# Patient Record
Sex: FEMALE | Race: WHITE | Employment: UNEMPLOYED | URBAN - NONMETROPOLITAN AREA
[De-identification: names, ages, dates, MRNs, and addresses within clinical notes are randomized per-mention and may not be internally consistent; named-entity substitution may affect disease eponyms.]

---

## 2018-08-20 LAB
CHOLESTEROL, TOTAL: 214 MG/DL
CHOLESTEROL/HDL RATIO: 4.9
CREATININE: 0.5 MG/DL
HDLC SERPL-MCNC: 44 MG/DL (ref 35–70)
LDL CHOLESTEROL CALCULATED: 146 MG/DL (ref 0–160)
POTASSIUM (K+): 4.6
TRIGL SERPL-MCNC: 120 MG/DL
VLDLC SERPL CALC-MCNC: NORMAL MG/DL

## 2019-05-07 ENCOUNTER — OFFICE VISIT (OUTPATIENT)
Dept: FAMILY MEDICINE CLINIC | Age: 49
End: 2019-05-07
Payer: COMMERCIAL

## 2019-05-07 VITALS
OXYGEN SATURATION: 97 % | DIASTOLIC BLOOD PRESSURE: 70 MMHG | HEIGHT: 59 IN | TEMPERATURE: 98.2 F | RESPIRATION RATE: 18 BRPM | HEART RATE: 114 BPM | SYSTOLIC BLOOD PRESSURE: 110 MMHG | BODY MASS INDEX: 36.49 KG/M2 | WEIGHT: 181 LBS

## 2019-05-07 DIAGNOSIS — L25.9 CONTACT DERMATITIS, UNSPECIFIED CONTACT DERMATITIS TYPE, UNSPECIFIED TRIGGER: ICD-10-CM

## 2019-05-07 DIAGNOSIS — L30.9 DERMATITIS: Primary | ICD-10-CM

## 2019-05-07 PROCEDURE — 96372 THER/PROPH/DIAG INJ SC/IM: CPT | Performed by: PHYSICIAN ASSISTANT

## 2019-05-07 PROCEDURE — 99213 OFFICE O/P EST LOW 20 MIN: CPT | Performed by: PHYSICIAN ASSISTANT

## 2019-05-07 RX ORDER — PREDNISONE 10 MG/1
TABLET ORAL
Qty: 30 TABLET | Refills: 0 | Status: SHIPPED | OUTPATIENT
Start: 2019-05-07 | End: 2019-05-19

## 2019-05-07 RX ORDER — METHYLPREDNISOLONE ACETATE 40 MG/ML
40 INJECTION, SUSPENSION INTRA-ARTICULAR; INTRALESIONAL; INTRAMUSCULAR; SOFT TISSUE ONCE
Status: COMPLETED | OUTPATIENT
Start: 2019-05-07 | End: 2019-05-07

## 2019-05-07 RX ORDER — LEVOMILNACIPRAN HYDROCHLORIDE 120 MG/1
120 CAPSULE, EXTENDED RELEASE ORAL DAILY
Refills: 0 | COMMUNITY
Start: 2019-02-27 | End: 2022-03-10 | Stop reason: ALTCHOICE

## 2019-05-07 RX ORDER — TRIAMTERENE AND HYDROCHLOROTHIAZIDE 37.5; 25 MG/1; MG/1
1 TABLET ORAL DAILY
Refills: 0 | COMMUNITY
Start: 2019-03-18 | End: 2019-06-03 | Stop reason: SDUPTHER

## 2019-05-07 RX ORDER — LISINOPRIL 10 MG/1
1 TABLET ORAL DAILY
Refills: 0 | COMMUNITY
Start: 2019-03-18 | End: 2019-06-03 | Stop reason: SDUPTHER

## 2019-05-07 RX ADMIN — METHYLPREDNISOLONE ACETATE 40 MG: 40 INJECTION, SUSPENSION INTRA-ARTICULAR; INTRALESIONAL; INTRAMUSCULAR; SOFT TISSUE at 11:41

## 2019-05-07 NOTE — PATIENT INSTRUCTIONS
Patient Education        Dermatitis: Care Instructions  Your Care Instructions  Dermatitis is the general name used for any rash or inflammation of the skin. Different kinds of dermatitis cause different kinds of rashes. Common causes of a rash include new medicines, plants (such as poison oak or poison ivy), heat, and stress. Certain illnesses can also cause a rash. An allergic reaction to something that touches your skin, such as latex, nickel, or poison ivy, is called contact dermatitis. Contact dermatitis may also be caused by something that irritates the skin, such as bleach, a chemical, or soap. These types of rashes cannot be spread from person to person. How long your rash will last depends on what caused it. Rashes may last a few days or months. Follow-up care is a key part of your treatment and safety. Be sure to make and go to all appointments, and call your doctor if you are having problems. It's also a good idea to know your test results and keep a list of the medicines you take. How can you care for yourself at home? · Do not scratch the rash. Cut your nails short, and file them smooth. Or wear gloves if this helps keep you from scratching. · Wash the area with water only. Pat dry. · Put cold, wet cloths on the rash to reduce itching. · Keep cool, and stay out of the sun. · Leave the rash open to the air as much as possible. · If the rash itches, use hydrocortisone cream. Follow the directions on the label. Calamine lotion may help for plant rashes. · Take an over-the-counter antihistamine, such as diphenhydramine (Benadryl) or loratadine (Claritin), to help calm the itching. Read and follow all instructions on the label. · If your doctor prescribed a cream, use it as directed. If your doctor prescribed medicine, take it exactly as directed. When should you call for help?   Call your doctor now or seek immediate medical care if:    · You have symptoms of infection, such as:  ? Increased pain, swelling, warmth, or redness. ? Red streaks leading from the area. ? Pus draining from the area. ? A fever.     · You have joint pain along with the rash.    Watch closely for changes in your health, and be sure to contact your doctor if:    · Your rash is changing or getting worse.     · You are not getting better as expected. Where can you learn more? Go to https://[employer]. org and sign in to your [employer] account. Enter [phone] in the [facility] box to learn more about \"Dermatitis: Care Instructions. \"     If you do not have an account, please click on the \"Sign Up Now\" link. Current as of: April 17, 2018  Content Version: 11.9  © 9630-9470 [employer]. Care instructions adapted under license by [facility] ([facility]). If you have questions about a medical condition or this instruction, always ask your healthcare professional. [place] [address] any warranty or liability for your use of this information.

## 2019-05-07 NOTE — PROGRESS NOTES
Speech is articulate and fluent. Psych: Mood/Affect: Patient's mood and affect is appropriate to situation. Benadryl prn itching      Diagnosis Orders   1. Dermatitis  predniSONE (DELTASONE) 10 MG tablet   2.  Contact dermatitis, unspecified contact dermatitis type, unspecified trigger  methylPREDNISolone acetate (DEPO-MEDROL) injection 40 mg         Seen By:    JERICA Reagan

## 2019-06-03 ENCOUNTER — OFFICE VISIT (OUTPATIENT)
Dept: PRIMARY CARE CLINIC | Age: 49
End: 2019-06-03
Payer: COMMERCIAL

## 2019-06-03 VITALS
WEIGHT: 182 LBS | OXYGEN SATURATION: 99 % | BODY MASS INDEX: 36.69 KG/M2 | RESPIRATION RATE: 18 BRPM | TEMPERATURE: 98.3 F | HEIGHT: 59 IN | SYSTOLIC BLOOD PRESSURE: 120 MMHG | DIASTOLIC BLOOD PRESSURE: 80 MMHG | HEART RATE: 99 BPM

## 2019-06-03 DIAGNOSIS — I34.0 NON-RHEUMATIC MITRAL REGURGITATION: ICD-10-CM

## 2019-06-03 DIAGNOSIS — E78.2 MIXED HYPERLIPIDEMIA: ICD-10-CM

## 2019-06-03 DIAGNOSIS — F34.1 DYSTHYMIA: ICD-10-CM

## 2019-06-03 DIAGNOSIS — I10 ESSENTIAL HYPERTENSION: Primary | ICD-10-CM

## 2019-06-03 PROBLEM — R03.0 ELEVATED BLOOD-PRESSURE READING WITHOUT DIAGNOSIS OF HYPERTENSION: Status: ACTIVE | Noted: 2019-06-03

## 2019-06-03 PROCEDURE — 99213 OFFICE O/P EST LOW 20 MIN: CPT | Performed by: INTERNAL MEDICINE

## 2019-06-03 RX ORDER — DILTIAZEM HYDROCHLORIDE 120 MG/1
120 CAPSULE, EXTENDED RELEASE ORAL DAILY
COMMUNITY
End: 2019-06-03 | Stop reason: SDUPTHER

## 2019-06-03 RX ORDER — LISINOPRIL 10 MG/1
10 TABLET ORAL DAILY
Qty: 30 TABLET | Refills: 3 | Status: SHIPPED | OUTPATIENT
Start: 2019-06-03 | End: 2019-09-03 | Stop reason: SDUPTHER

## 2019-06-03 RX ORDER — DILTIAZEM HYDROCHLORIDE 120 MG/1
120 CAPSULE, EXTENDED RELEASE ORAL DAILY
Qty: 30 CAPSULE | Refills: 3 | Status: SHIPPED | OUTPATIENT
Start: 2019-06-03 | End: 2019-09-03 | Stop reason: SDUPTHER

## 2019-06-03 RX ORDER — TRIAMTERENE AND HYDROCHLOROTHIAZIDE 37.5; 25 MG/1; MG/1
1 TABLET ORAL DAILY
Qty: 30 TABLET | Refills: 3 | Status: SHIPPED | OUTPATIENT
Start: 2019-06-03 | End: 2019-09-03 | Stop reason: SDUPTHER

## 2019-06-03 ASSESSMENT — ENCOUNTER SYMPTOMS
SORE THROAT: 0
STRIDOR: 0
FACIAL SWELLING: 0
VOMITING: 0
SHORTNESS OF BREATH: 0
EYE ITCHING: 0
WHEEZING: 0
PHOTOPHOBIA: 0
TROUBLE SWALLOWING: 0
NAUSEA: 0
COUGH: 0
CONSTIPATION: 0
BLOOD IN STOOL: 0
EYE DISCHARGE: 0
RHINORRHEA: 0
ANAL BLEEDING: 0
COLOR CHANGE: 0
ABDOMINAL PAIN: 0
EYE PAIN: 0
DIARRHEA: 0

## 2019-06-03 ASSESSMENT — PATIENT HEALTH QUESTIONNAIRE - PHQ9
SUM OF ALL RESPONSES TO PHQ QUESTIONS 1-9: 0
2. FEELING DOWN, DEPRESSED OR HOPELESS: 0
SUM OF ALL RESPONSES TO PHQ9 QUESTIONS 1 & 2: 0
SUM OF ALL RESPONSES TO PHQ QUESTIONS 1-9: 0
1. LITTLE INTEREST OR PLEASURE IN DOING THINGS: 0

## 2019-06-03 NOTE — ASSESSMENT & PLAN NOTE
Blood pressures are stable. Continue medications and monitor blood pressures at home. Call office if systolics are over 974 over diastolics over 90.  Check CMP

## 2019-06-03 NOTE — PROGRESS NOTES
6/3/2019    Name: Melly Hernandez : 1970 Sex: female  Age: 52 y.o. Subjective:  Chief Complaint   Patient presents with    Hypertension        HPI     Patient has a history of nonrheumatic mitral regurgitation under the care of Dr. Ruel Hoang. Most recent echocardiogram done about a month ago showed continued improvement in her ejection fraction. She started out with a 40% ejection fraction that improved to 60% in November. She has an appointment to see him next month. She hasn't had her blood work done yet. She promises to get up in the near future. Last blood work last year showed total cholesterol 214, HDL cholesterol 44 and LDL cholesterol 146. She saw Dr. Jerzy Pinzon this year and she had a Pap test and a mammogram done the mammogram was done at [facility] see about getting the report. Depression and sees Dr. Giuseppe Prieto her psychiatrist. He has been on [address]. Biggest problem is diaphoresis. Can happen any time day or night. She wonders if one of her medications could be causing this. Review of Systems   Constitutional: Negative for appetite change, fatigue and unexpected weight change. HENT: Negative for congestion, ear pain, facial swelling, rhinorrhea, sore throat, tinnitus and trouble swallowing. Eyes: Negative for photophobia, pain, discharge, itching and visual disturbance. Respiratory: Negative for cough, shortness of breath, wheezing and stridor. Cardiovascular: Negative for chest pain, palpitations and leg swelling. Gastrointestinal: Negative for abdominal pain, anal bleeding, blood in stool, constipation, diarrhea, nausea and vomiting. Endocrine: Negative for cold intolerance, heat intolerance, polydipsia, polyphagia and polyuria. Severe sweating spells   Genitourinary: Negative for difficulty urinating, dysuria, flank pain, frequency, hematuria and urgency. Musculoskeletal: Negative for arthralgias, gait problem, joint swelling and myalgias.    Skin: Negative for color change, pallor and rash. Allergic/Immunologic: Negative for environmental allergies and food allergies. Neurological: Negative for dizziness, tremors, seizures, syncope, speech difficulty, weakness, light-headedness, numbness and headaches. Hematological: Negative for adenopathy. Does not bruise/bleed easily. Psychiatric/Behavioral: Negative for agitation, behavioral problems, confusion, sleep disturbance and suicidal ideas. The patient is not nervous/anxious. Current Outpatient Medications:     diltiazem (DILT-XR) 120 MG extended release capsule, Take 1 capsule by mouth daily, Disp: 30 capsule, Rfl: 3    lisinopril (PRINIVIL;ZESTRIL) 10 MG tablet, Take 1 tablet by mouth daily, Disp: 30 tablet, Rfl: 3    triamterene-hydrochlorothiazide (MAXZIDE-25) 37.5-25 MG per tablet, Take 1 tablet by mouth daily, Disp: 30 tablet, Rfl: 3    FETZIMA 120 MG CP24 extended release capsule, 1 capsule daily, Disp: , Rfl: 0     Allergies   Allergen Reactions    Bupropion Hives        Past Medical History:   Diagnosis Date    Dysthymia 6/3/2019    Essential hypertension 6/3/2019    Mixed hyperlipidemia 6/3/2019    Non-rheumatic mitral regurgitation 6/3/2019       Health Maintenance Due   Topic Date Due    Potassium monitoring  1970    Creatinine monitoring  1970    Pneumococcal 0-64 years Vaccine (1 of 1 - PPSV23) 1976    HIV screen  1985    DTaP/Tdap/Td vaccine (1 - Tdap) 1989    Cervical cancer screen  1991    Lipid screen  2010    Diabetes screen  2010        Patient Active Problem List   Diagnosis    Non-rheumatic mitral regurgitation    Mixed hyperlipidemia    Essential hypertension    Dysthymia        Past Surgical History:   Procedure Laterality Date     SECTION      three times    HIP ARTHROPLASTY Left         History reviewed. No pertinent family history.      Social History     Tobacco Use    Smoking status: Current Every Day Smoker     Packs/day: 1.00     Years: 30.00     Pack years: 30.00     Start date: 1980    Smokeless tobacco: Never Used   Substance Use Topics    Alcohol use: Not Currently    Drug use: Never        Objective  Vitals:    06/03/19 1154   BP: 120/80   Site: Left Upper Arm   Position: Sitting   Cuff Size: Large Adult   Pulse: 99   Resp: 18   Temp: 98.3 °F (36.8 °C)   TempSrc: Temporal   SpO2: 99%   Weight: 182 lb (82.6 kg)   Height: 4' 11\" (1.499 m)        Exam:  Physical Exam   Constitutional: She is oriented to person, place, and time. She appears well-developed and well-nourished. HENT:   Head: Normocephalic. Right Ear: External ear normal.   Left Ear: External ear normal.   Nose: Nose normal.   Mouth/Throat: Oropharynx is clear and moist. No oropharyngeal exudate. Eyes: Pupils are equal, round, and reactive to light. Conjunctivae and EOM are normal. Right eye exhibits no discharge. Left eye exhibits no discharge. No scleral icterus. Neck: Normal range of motion. Neck supple. No thyromegaly present. Cardiovascular: Normal rate, regular rhythm, normal heart sounds and intact distal pulses. Exam reveals no gallop and no friction rub. No murmur heard. Pulmonary/Chest: Effort normal and breath sounds normal. No respiratory distress. She has no wheezes. She has no rales. She exhibits no tenderness. Abdominal: Soft. Bowel sounds are normal. She exhibits no distension and no mass. There is no tenderness. There is no rebound and no guarding. Musculoskeletal: Normal range of motion. She exhibits no edema, tenderness or deformity. Lymphadenopathy:     She has no cervical adenopathy. Neurological: She is alert and oriented to person, place, and time. She displays normal reflexes. No cranial nerve deficit or sensory deficit. Skin: Skin is warm and dry. No rash noted. No erythema. No pallor. Psychiatric: She has a normal mood and affect.  Her behavior is normal. Judgment and thought content normal.        Last labs reviewed. ASSESSMENT & PLAN :   Problem List        Circulatory    Non-rheumatic mitral regurgitation     Follow-up with cardiologist. Will get the report of the last echocardiogram         Relevant Medications    diltiazem (DILT-XR) 120 MG extended release capsule    lisinopril (PRINIVIL;ZESTRIL) 10 MG tablet    triamterene-hydrochlorothiazide (MAXZIDE-25) 37.5-25 MG per tablet    Essential hypertension - Primary     Blood pressures are stable. Continue medications and monitor blood pressures at home. Call office if systolics are over 496 over diastolics over 90. Check CMP         Relevant Medications    diltiazem (DILT-XR) 120 MG extended release capsule    lisinopril (PRINIVIL;ZESTRIL) 10 MG tablet    triamterene-hydrochlorothiazide (MAXZIDE-25) 37.5-25 MG per tablet    Other Relevant Orders    Comprehensive Metabolic Panel       Other    Mixed hyperlipidemia     Watch saturated fats in diet and will monitor lipids         Relevant Medications    diltiazem (DILT-XR) 120 MG extended release capsule    lisinopril (PRINIVIL;ZESTRIL) 10 MG tablet    triamterene-hydrochlorothiazide (MAXZIDE-25) 37.5-25 MG per tablet    Other Relevant Orders    Lipid Panel    Dysthymia     Continue follow-up with her psychiatrist         Relevant Medications    FETZIMA 120 MG CP24 extended release capsule           Return in about 3 months (around 9/3/2019).        Juan Diego Blanc,   6/3/2019

## 2019-08-29 RX ORDER — NITROFURANTOIN 25; 75 MG/1; MG/1
100 CAPSULE ORAL 2 TIMES DAILY
COMMUNITY
End: 2019-09-03

## 2019-09-02 ASSESSMENT — ENCOUNTER SYMPTOMS
EYE ITCHING: 0
EYE DISCHARGE: 0
NAUSEA: 0
ABDOMINAL PAIN: 0
SHORTNESS OF BREATH: 0
ANAL BLEEDING: 0
FACIAL SWELLING: 0
CONSTIPATION: 0
RHINORRHEA: 0
BLOOD IN STOOL: 0
COUGH: 0
PHOTOPHOBIA: 0
COLOR CHANGE: 0
STRIDOR: 0
EYE PAIN: 0
SORE THROAT: 0
TROUBLE SWALLOWING: 0
WHEEZING: 0
DIARRHEA: 0
VOMITING: 0

## 2019-09-02 NOTE — PROGRESS NOTES
9/3/2019    Name: Jessica Hernandez : 1970 Sex: female  Age: 52 y.o. Subjective:  Chief Complaint   Patient presents with    Hypertension    Hyperlipidemia        HPI     Patient has a history of nonrheumatic mitral regurgitation under the care of Dr. Frankie Parikh. Most recent echocardiogram done  showed continued improvement in her ejection fraction. She started out with a 40% ejection fraction that improved to 60% in November. . Last blood work last year showed total cholesterol 214, HDL cholesterol 44 and LDL cholesterol 146. Blood work will be done today. She saw Dr. Gem Navas this year and she had a Pap test and a mammogram done the mammogram was done at [facility] see about getting the report. She also had an IUD placed which is good for 5 years. She has had a lot of hot flashes and I wonders if it is because she is perimenopausal I doubt that is from her medication. Depression and sees Dr. Marc Aaron her psychiatrist. He has been on [address]. Biggest problem is diaphoresis. Can happen any time day or night. She wonders if one of her medications could be causing this. Review of Systems   Constitutional: Negative for appetite change, fatigue and unexpected weight change. HENT: Negative for congestion, ear pain, facial swelling, rhinorrhea, sore throat, tinnitus and trouble swallowing. Eyes: Negative for photophobia, pain, discharge, itching and visual disturbance. Respiratory: Negative for cough, shortness of breath, wheezing and stridor. Cardiovascular: Negative for chest pain, palpitations and leg swelling. Gastrointestinal: Negative for abdominal pain, anal bleeding, blood in stool, constipation, diarrhea, nausea and vomiting. Endocrine: Negative for cold intolerance, heat intolerance, polydipsia, polyphagia and polyuria. Severe sweating spells   Genitourinary: Negative for difficulty urinating, dysuria, flank pain, frequency, hematuria and urgency.

## 2019-09-03 ENCOUNTER — TELEPHONE (OUTPATIENT)
Dept: PRIMARY CARE CLINIC | Age: 49
End: 2019-09-03

## 2019-09-03 ENCOUNTER — HOSPITAL ENCOUNTER (OUTPATIENT)
Age: 49
Discharge: HOME | End: 2019-09-05
Payer: COMMERCIAL

## 2019-09-03 ENCOUNTER — OFFICE VISIT (OUTPATIENT)
Dept: PRIMARY CARE CLINIC | Age: 49
End: 2019-09-03
Payer: COMMERCIAL

## 2019-09-03 VITALS
WEIGHT: 180 LBS | HEIGHT: 59 IN | RESPIRATION RATE: 16 BRPM | OXYGEN SATURATION: 96 % | SYSTOLIC BLOOD PRESSURE: 112 MMHG | HEART RATE: 99 BPM | BODY MASS INDEX: 36.29 KG/M2 | DIASTOLIC BLOOD PRESSURE: 78 MMHG | TEMPERATURE: 98.2 F

## 2019-09-03 DIAGNOSIS — I34.0 NON-RHEUMATIC MITRAL REGURGITATION: ICD-10-CM

## 2019-09-03 DIAGNOSIS — E78.2 MIXED HYPERLIPIDEMIA: ICD-10-CM

## 2019-09-03 DIAGNOSIS — I10 ESSENTIAL HYPERTENSION: Primary | ICD-10-CM

## 2019-09-03 DIAGNOSIS — E01.0 THYROMEGALY: ICD-10-CM

## 2019-09-03 DIAGNOSIS — I10 ESSENTIAL HYPERTENSION: ICD-10-CM

## 2019-09-03 DIAGNOSIS — F32.A DEPRESSION, UNSPECIFIED DEPRESSION TYPE: ICD-10-CM

## 2019-09-03 LAB
ALBUMIN SERPL-MCNC: 4.5 G/DL (ref 3.5–5.2)
ALP BLD-CCNC: 70 U/L (ref 35–104)
ALT SERPL-CCNC: 18 U/L (ref 0–32)
ANION GAP SERPL CALCULATED.3IONS-SCNC: 15 MMOL/L (ref 7–16)
AST SERPL-CCNC: 25 U/L (ref 0–31)
BASOPHILS ABSOLUTE: 0.02 E9/L (ref 0–0.2)
BASOPHILS RELATIVE PERCENT: 0.2 % (ref 0–2)
BILIRUB SERPL-MCNC: 0.3 MG/DL (ref 0–1.2)
BUN BLDV-MCNC: 12 MG/DL (ref 6–20)
CALCIUM SERPL-MCNC: 10 MG/DL (ref 8.6–10.2)
CHLORIDE BLD-SCNC: 101 MMOL/L (ref 98–107)
CHOLESTEROL, TOTAL: 214 MG/DL (ref 0–199)
CO2: 25 MMOL/L (ref 22–29)
CREAT SERPL-MCNC: 0.6 MG/DL (ref 0.5–1)
EOSINOPHILS ABSOLUTE: 0.36 E9/L (ref 0.05–0.5)
EOSINOPHILS RELATIVE PERCENT: 4 % (ref 0–6)
GFR AFRICAN AMERICAN: >60
GFR NON-AFRICAN AMERICAN: >60 ML/MIN/1.73
GLUCOSE BLD-MCNC: 92 MG/DL (ref 74–99)
HCT VFR BLD CALC: 48.2 % (ref 34–48)
HDLC SERPL-MCNC: 41 MG/DL
HEMOGLOBIN: 15.3 G/DL (ref 11.5–15.5)
IMMATURE GRANULOCYTES #: 0.04 E9/L
IMMATURE GRANULOCYTES %: 0.4 % (ref 0–5)
LDL CHOLESTEROL CALCULATED: 147 MG/DL (ref 0–99)
LYMPHOCYTES ABSOLUTE: 2.09 E9/L (ref 1.5–4)
LYMPHOCYTES RELATIVE PERCENT: 23.4 % (ref 20–42)
MCH RBC QN AUTO: 30.8 PG (ref 26–35)
MCHC RBC AUTO-ENTMCNC: 31.7 % (ref 32–34.5)
MCV RBC AUTO: 97 FL (ref 80–99.9)
MONOCYTES ABSOLUTE: 0.75 E9/L (ref 0.1–0.95)
MONOCYTES RELATIVE PERCENT: 8.4 % (ref 2–12)
NEUTROPHILS ABSOLUTE: 5.66 E9/L (ref 1.8–7.3)
NEUTROPHILS RELATIVE PERCENT: 63.6 % (ref 43–80)
PDW BLD-RTO: 13.2 FL (ref 11.5–15)
PLATELET # BLD: 384 E9/L (ref 130–450)
PMV BLD AUTO: 10.9 FL (ref 7–12)
POTASSIUM SERPL-SCNC: 4.3 MMOL/L (ref 3.5–5)
RBC # BLD: 4.97 E12/L (ref 3.5–5.5)
SODIUM BLD-SCNC: 141 MMOL/L (ref 132–146)
TOTAL PROTEIN: 7.5 G/DL (ref 6.4–8.3)
TRIGL SERPL-MCNC: 128 MG/DL (ref 0–149)
TSH SERPL DL<=0.05 MIU/L-ACNC: 1.45 UIU/ML (ref 0.27–4.2)
VLDLC SERPL CALC-MCNC: 26 MG/DL
WBC # BLD: 8.9 E9/L (ref 4.5–11.5)

## 2019-09-03 PROCEDURE — 36415 COLL VENOUS BLD VENIPUNCTURE: CPT

## 2019-09-03 PROCEDURE — 99214 OFFICE O/P EST MOD 30 MIN: CPT | Performed by: INTERNAL MEDICINE

## 2019-09-03 PROCEDURE — 85025 COMPLETE CBC W/AUTO DIFF WBC: CPT

## 2019-09-03 PROCEDURE — 80053 COMPREHEN METABOLIC PANEL: CPT

## 2019-09-03 PROCEDURE — 84443 ASSAY THYROID STIM HORMONE: CPT

## 2019-09-03 PROCEDURE — 80061 LIPID PANEL: CPT

## 2019-09-03 RX ORDER — TRIAMTERENE AND HYDROCHLOROTHIAZIDE 37.5; 25 MG/1; MG/1
1 TABLET ORAL DAILY
Qty: 30 TABLET | Refills: 5 | Status: SHIPPED | OUTPATIENT
Start: 2019-09-03 | End: 2020-01-14 | Stop reason: SDUPTHER

## 2019-09-03 RX ORDER — DILTIAZEM HYDROCHLORIDE 120 MG/1
120 CAPSULE, EXTENDED RELEASE ORAL DAILY
Qty: 30 CAPSULE | Refills: 5 | Status: SHIPPED | OUTPATIENT
Start: 2019-09-03 | End: 2020-01-14 | Stop reason: SDUPTHER

## 2019-09-03 RX ORDER — LEVOMILNACIPRAN HYDROCHLORIDE 120 MG/1
120 CAPSULE, EXTENDED RELEASE ORAL DAILY
Qty: 30 CAPSULE | Refills: 5 | Status: CANCELLED | OUTPATIENT
Start: 2019-09-03

## 2019-09-03 RX ORDER — LISINOPRIL 10 MG/1
10 TABLET ORAL DAILY
Qty: 30 TABLET | Refills: 5 | Status: SHIPPED | OUTPATIENT
Start: 2019-09-03 | End: 2020-01-14 | Stop reason: SDUPTHER

## 2019-09-03 ASSESSMENT — ENCOUNTER SYMPTOMS: BACK PAIN: 1

## 2019-09-03 NOTE — ASSESSMENT & PLAN NOTE
Blood pressures are stable. Continue medications and monitor blood pressures at home. Call office if systolics are over 131 over diastolics over 90.   Check CMP

## 2019-09-05 ENCOUNTER — NURSE ONLY (OUTPATIENT)
Dept: PRIMARY CARE CLINIC | Age: 49
End: 2019-09-05
Payer: COMMERCIAL

## 2019-09-05 PROCEDURE — G0008 ADMIN INFLUENZA VIRUS VAC: HCPCS | Performed by: INTERNAL MEDICINE

## 2019-09-05 PROCEDURE — 90686 IIV4 VACC NO PRSV 0.5 ML IM: CPT | Performed by: INTERNAL MEDICINE

## 2020-01-09 ASSESSMENT — ENCOUNTER SYMPTOMS
TROUBLE SWALLOWING: 0
ANAL BLEEDING: 0
EYE PAIN: 0
EYE ITCHING: 0
CONSTIPATION: 0
ABDOMINAL PAIN: 0
COLOR CHANGE: 0
COUGH: 0
NAUSEA: 0
SHORTNESS OF BREATH: 0
EYE DISCHARGE: 0
RHINORRHEA: 0
SORE THROAT: 0
PHOTOPHOBIA: 0
VOMITING: 0
DIARRHEA: 0
STRIDOR: 0
WHEEZING: 0
BLOOD IN STOOL: 0
BACK PAIN: 1
FACIAL SWELLING: 0

## 2020-01-09 NOTE — PROGRESS NOTES
2020    Name: Andrzej Rodriguez : 1970 Sex: female  Age: 52 y.o. Subjective:  Chief Complaint   Patient presents with    Hypertension    Hyperlipidemia     4 mo check up ; patient is fasting for labs        HPI     Patient has a history of nonrheumatic mitral regurgitation under the care of Dr. Lanre Raza. Most recent echocardiogram done  showed continued improvement in her ejection fraction. She started out with a 40% ejection fraction that improved to 60% in November. . Last blood work in 2019 showed total cholesterol 214, HDL cholesterol 41 and LDL cholesterol 147. Blood work will be done today. She saw Dr. Morteza Quiñones this year and she had a Pap test and a mammogram done the mammogram was done at [facility] see about getting the report. She also had an IUD placed which is good for 5 years. She has had a lot of hot flashes and I wonders if it is because she is perimenopausal I doubt that is from her medication. Depression and sees Dr. Meli Falcon her psychiatrist. He has been on [address]. Has been having increasing pain in her right hip. She would like to see Dr. Debbi Barahona about this. She  had a left hip replacement. Actually had a couple of surgeries on her left hip because of severe degenerative arthritis. Pain in her right hip radiates into her right pelvic area. She is having a hard time lifting her leg and getting in and out of cars. .    Review of Systems   Constitutional: Negative for appetite change, fatigue and unexpected weight change. HENT: Negative for congestion, ear pain, facial swelling, rhinorrhea, sore throat, tinnitus and trouble swallowing. Eyes: Negative for photophobia, pain, discharge, itching and visual disturbance. Respiratory: Negative for cough, shortness of breath, wheezing and stridor. Cardiovascular: Negative for chest pain, palpitations and leg swelling.    Gastrointestinal: Negative for abdominal pain, anal bleeding, blood in stool, constipation, diarrhea, nausea and vomiting. Endocrine: Negative for cold intolerance, heat intolerance, polydipsia, polyphagia and polyuria. Severe sweating spells   Genitourinary: Negative for difficulty urinating, dysuria, flank pain, frequency, hematuria and urgency. Musculoskeletal: Positive for arthralgias and back pain. Negative for gait problem, joint swelling and myalgias. Right hip pain   Skin: Negative for color change, pallor and rash. Allergic/Immunologic: Negative for environmental allergies and food allergies. Neurological: Negative for dizziness, tremors, seizures, syncope, speech difficulty, weakness, light-headedness, numbness and headaches. Hematological: Negative for adenopathy. Does not bruise/bleed easily. Psychiatric/Behavioral: Negative for agitation, behavioral problems, confusion, sleep disturbance and suicidal ideas. The patient is not nervous/anxious.            Current Outpatient Medications:     diltiazem (DILT-XR) 120 MG extended release capsule, Take 1 capsule by mouth daily, Disp: 30 capsule, Rfl: 5    lisinopril (PRINIVIL;ZESTRIL) 10 MG tablet, Take 1 tablet by mouth daily, Disp: 30 tablet, Rfl: 5    triamterene-hydrochlorothiazide (MAXZIDE-25) 37.5-25 MG per tablet, Take 1 tablet by mouth daily, Disp: 30 tablet, Rfl: 5    FETZIMA 120 MG CP24 extended release capsule, 1 capsule daily, Disp: , Rfl: 0     Allergies   Allergen Reactions    Bupropion Hives        Past Medical History:   Diagnosis Date    Essential hypertension 6/3/2019    Hip arthritis     right    Mixed hyperlipidemia 6/3/2019    Non-rheumatic mitral regurgitation 6/3/2019       Health Maintenance Due   Topic Date Due    Pneumococcal 0-64 years Vaccine (1 of 1 - PPSV23) 01/25/1976    DTaP/Tdap/Td vaccine (1 - Tdap) 01/25/1981    HIV screen  01/25/1985    Cervical cancer screen  01/25/1991    Annual Wellness Visit (AWV)  05/29/2019        Patient Active Problem List   Diagnosis    is no distension. Palpations: Abdomen is soft. There is no mass. Tenderness: There is no tenderness. There is no guarding or rebound. Musculoskeletal: Normal range of motion. General: No tenderness or deformity. Comments: Tenderness to palpation right hip   Lymphadenopathy:      Cervical: No cervical adenopathy. Skin:     General: Skin is warm and dry. Coloration: Skin is not pale. Findings: No erythema or rash. Neurological:      General: No focal deficit present. Mental Status: She is alert and oriented to person, place, and time. Cranial Nerves: No cranial nerve deficit. Sensory: No sensory deficit. Deep Tendon Reflexes: Reflexes normal.   Psychiatric:         Mood and Affect: Mood normal.         Behavior: Behavior normal.         Thought Content: Thought content normal.         Judgment: Judgment normal.          Last labs reviewed. ASSESSMENT & PLAN :   Problem List        Circulatory    Non-rheumatic mitral regurgitation     Follow-up with Dr. Mary Cameron this year. He gets yearly echocardiograms on her. Relevant Medications    diltiazem (DILT-XR) 120 MG extended release capsule    lisinopril (PRINIVIL;ZESTRIL) 10 MG tablet    triamterene-hydrochlorothiazide (MAXZIDE-25) 37.5-25 MG per tablet    Essential hypertension - Primary     Blood pressures are stable. Continue medications and monitor blood pressures at home. Call office if systolics are over 720 over diastolics over 90.          Relevant Medications    diltiazem (DILT-XR) 120 MG extended release capsule    lisinopril (PRINIVIL;ZESTRIL) 10 MG tablet    triamterene-hydrochlorothiazide (MAXZIDE-25) 37.5-25 MG per tablet    Other Relevant Orders    Comprehensive Metabolic Panel       Endocrine    Thyromegaly     Will monitor, last TSH was normal            Other    Mixed hyperlipidemia     Watch saturated fats in diet and will monitor lipids         Relevant Medications    diltiazem

## 2020-01-14 ENCOUNTER — OFFICE VISIT (OUTPATIENT)
Dept: PRIMARY CARE CLINIC | Age: 50
End: 2020-01-14
Payer: COMMERCIAL

## 2020-01-14 ENCOUNTER — HOSPITAL ENCOUNTER (OUTPATIENT)
Age: 50
Discharge: HOME | End: 2020-01-16
Payer: COMMERCIAL

## 2020-01-14 ENCOUNTER — TELEPHONE (OUTPATIENT)
Dept: PRIMARY CARE CLINIC | Age: 50
End: 2020-01-14

## 2020-01-14 VITALS
SYSTOLIC BLOOD PRESSURE: 118 MMHG | WEIGHT: 182 LBS | RESPIRATION RATE: 16 BRPM | OXYGEN SATURATION: 97 % | DIASTOLIC BLOOD PRESSURE: 78 MMHG | BODY MASS INDEX: 36.69 KG/M2 | TEMPERATURE: 98 F | HEIGHT: 59 IN | HEART RATE: 102 BPM

## 2020-01-14 PROBLEM — M25.551 PAIN OF RIGHT HIP JOINT: Status: ACTIVE | Noted: 2020-01-14

## 2020-01-14 LAB
ALBUMIN SERPL-MCNC: 4.5 G/DL (ref 3.5–5.2)
ALP BLD-CCNC: 79 U/L (ref 35–104)
ALT SERPL-CCNC: 23 U/L (ref 0–32)
ANION GAP SERPL CALCULATED.3IONS-SCNC: 14 MMOL/L (ref 7–16)
AST SERPL-CCNC: 27 U/L (ref 0–31)
BILIRUB SERPL-MCNC: 0.3 MG/DL (ref 0–1.2)
BUN BLDV-MCNC: 13 MG/DL (ref 6–20)
CALCIUM SERPL-MCNC: 10.1 MG/DL (ref 8.6–10.2)
CHLORIDE BLD-SCNC: 97 MMOL/L (ref 98–107)
CHOLESTEROL, TOTAL: 208 MG/DL (ref 0–199)
CO2: 26 MMOL/L (ref 22–29)
CREAT SERPL-MCNC: 0.6 MG/DL (ref 0.5–1)
GFR AFRICAN AMERICAN: >60
GFR NON-AFRICAN AMERICAN: >60 ML/MIN/1.73
GLUCOSE BLD-MCNC: 99 MG/DL (ref 74–99)
HDLC SERPL-MCNC: 41 MG/DL
LDL CHOLESTEROL CALCULATED: 146 MG/DL (ref 0–99)
POTASSIUM SERPL-SCNC: 4.1 MMOL/L (ref 3.5–5)
SODIUM BLD-SCNC: 137 MMOL/L (ref 132–146)
TOTAL PROTEIN: 7.6 G/DL (ref 6.4–8.3)
TRIGL SERPL-MCNC: 104 MG/DL (ref 0–149)
VLDLC SERPL CALC-MCNC: 21 MG/DL

## 2020-01-14 PROCEDURE — G8482 FLU IMMUNIZE ORDER/ADMIN: HCPCS | Performed by: INTERNAL MEDICINE

## 2020-01-14 PROCEDURE — 99214 OFFICE O/P EST MOD 30 MIN: CPT | Performed by: INTERNAL MEDICINE

## 2020-01-14 PROCEDURE — 80061 LIPID PANEL: CPT

## 2020-01-14 PROCEDURE — 36415 COLL VENOUS BLD VENIPUNCTURE: CPT

## 2020-01-14 PROCEDURE — 4004F PT TOBACCO SCREEN RCVD TLK: CPT | Performed by: INTERNAL MEDICINE

## 2020-01-14 PROCEDURE — G8417 CALC BMI ABV UP PARAM F/U: HCPCS | Performed by: INTERNAL MEDICINE

## 2020-01-14 PROCEDURE — 80053 COMPREHEN METABOLIC PANEL: CPT

## 2020-01-14 PROCEDURE — G8427 DOCREV CUR MEDS BY ELIG CLIN: HCPCS | Performed by: INTERNAL MEDICINE

## 2020-01-14 RX ORDER — TRIAMTERENE AND HYDROCHLOROTHIAZIDE 37.5; 25 MG/1; MG/1
1 TABLET ORAL DAILY
Qty: 30 TABLET | Refills: 5 | Status: SHIPPED
Start: 2020-01-14 | End: 2020-06-23 | Stop reason: SDUPTHER

## 2020-01-14 RX ORDER — DILTIAZEM HYDROCHLORIDE 120 MG/1
120 CAPSULE, EXTENDED RELEASE ORAL DAILY
Qty: 30 CAPSULE | Refills: 5 | Status: SHIPPED
Start: 2020-01-14 | End: 2020-06-23 | Stop reason: SDUPTHER

## 2020-01-14 RX ORDER — LEVOMILNACIPRAN HYDROCHLORIDE 120 MG/1
120 CAPSULE, EXTENDED RELEASE ORAL DAILY
Qty: 30 CAPSULE | Refills: 5 | Status: CANCELLED | OUTPATIENT
Start: 2020-01-14

## 2020-01-14 RX ORDER — LISINOPRIL 10 MG/1
10 TABLET ORAL DAILY
Qty: 30 TABLET | Refills: 5 | Status: SHIPPED
Start: 2020-01-14 | End: 2020-06-23 | Stop reason: SDUPTHER

## 2020-02-19 ENCOUNTER — TELEPHONE (OUTPATIENT)
Dept: PRIMARY CARE CLINIC | Age: 50
End: 2020-02-19

## 2020-02-19 ENCOUNTER — OFFICE VISIT (OUTPATIENT)
Dept: PRIMARY CARE CLINIC | Age: 50
End: 2020-02-19
Payer: COMMERCIAL

## 2020-02-19 VITALS
HEART RATE: 99 BPM | BODY MASS INDEX: 35.28 KG/M2 | SYSTOLIC BLOOD PRESSURE: 112 MMHG | HEIGHT: 59 IN | DIASTOLIC BLOOD PRESSURE: 64 MMHG | WEIGHT: 175 LBS | OXYGEN SATURATION: 99 %

## 2020-02-19 PROBLEM — M16.10 HIP ARTHRITIS: Status: ACTIVE | Noted: 2020-02-19

## 2020-02-19 PROBLEM — Z01.818 ENCOUNTER FOR PRE-OPERATIVE EXAMINATION: Status: ACTIVE | Noted: 2020-02-19

## 2020-02-19 PROCEDURE — G8427 DOCREV CUR MEDS BY ELIG CLIN: HCPCS | Performed by: INTERNAL MEDICINE

## 2020-02-19 PROCEDURE — G8482 FLU IMMUNIZE ORDER/ADMIN: HCPCS | Performed by: INTERNAL MEDICINE

## 2020-02-19 PROCEDURE — 3017F COLORECTAL CA SCREEN DOC REV: CPT | Performed by: INTERNAL MEDICINE

## 2020-02-19 PROCEDURE — 99214 OFFICE O/P EST MOD 30 MIN: CPT | Performed by: INTERNAL MEDICINE

## 2020-02-19 PROCEDURE — G8417 CALC BMI ABV UP PARAM F/U: HCPCS | Performed by: INTERNAL MEDICINE

## 2020-02-19 PROCEDURE — 4004F PT TOBACCO SCREEN RCVD TLK: CPT | Performed by: INTERNAL MEDICINE

## 2020-02-19 ASSESSMENT — ENCOUNTER SYMPTOMS
STRIDOR: 0
BACK PAIN: 1
NAUSEA: 0
COLOR CHANGE: 0
EYE DISCHARGE: 0
FACIAL SWELLING: 0
COUGH: 0
SHORTNESS OF BREATH: 0
ABDOMINAL PAIN: 0
WHEEZING: 0
RHINORRHEA: 0
EYE ITCHING: 0
ANAL BLEEDING: 0
EYE PAIN: 0
CONSTIPATION: 0
PHOTOPHOBIA: 0
BLOOD IN STOOL: 0
DIARRHEA: 1
SORE THROAT: 0
TROUBLE SWALLOWING: 0
VOMITING: 0

## 2020-02-19 NOTE — PROGRESS NOTES
2020    Name: Adela Castillo : 1970 Sex: female  Age: 48 y.o. Subjective:  Chief Complaint   Patient presents with    Hypertension        Hypertension   Pertinent negatives include no chest pain, headaches, palpitations or shortness of breath. Patient is here for preoperative clearance. To have right THR with Dr Bridgett Gilford on . Has been in severe pain since [place] [facility]. She was reaching into the clothes dryer and felt a really sharp pain. She saw Dr. Bridgett Gilford who reviewed her x-rays and told her she had bone-on-bone, no cartilage and she also had arthritis. She had her left hip placed about 5 years ago. When I asked her if there was any problems as far as osteoporosis or anything like that she said no. She said her mother had the same problem. Her mother was also short like her. She has an IUD in place does not have menstrual periods but she has not had a menstrual period for 2 years. We probably need to consider doing a bone density scan on her in view of all these problems. Patient has a history of nonrheumatic mitral regurgitation under the care of Dr. Kevon Stephens. Most recent echocardiogram done  showed continued improvement in her ejection fraction. She started out with a 40% ejection fraction that improved to 60% in 2019. She is totally asymptomatic. No shortness of breath, chest pain, PND orthopnea    . Last blood work in 2019 showed total cholesterol 214, HDL cholesterol 41 and LDL cholesterol 147. Blood work will be done today. She has a history of hypertension and is on 3 medications. She has been feeling a little tired and lightheaded I wonder if it is because her blood pressures are too low. Her blood pressure here is 112/64. We are going to cut her lisinopril down to 5 mg a day. She saw Dr. Ruel Caldera this year and she had a Pap test and a mammogram done the mammogram was done at [facility] see about getting the report.   She also had an IUD placed which is good for 5 years. She has had a lot of hot flashes and I wonders if it is because she is perimenopausal I doubt that is from her medication. History of depression and sees Dr. Thalia Mcmanus her psychiatrist. He has been on [address]. s..    Review of Systems   Constitutional: Negative for appetite change, fatigue and unexpected weight change. HENT: Negative for congestion, ear pain, facial swelling, rhinorrhea, sore throat, tinnitus and trouble swallowing. Eyes: Negative for photophobia, pain, discharge, itching and visual disturbance. Respiratory: Negative for cough, shortness of breath, wheezing and stridor. Cardiovascular: Negative for chest pain, palpitations and leg swelling. Gastrointestinal: Positive for diarrhea. Negative for abdominal pain, anal bleeding, blood in stool, constipation, nausea and vomiting. Endocrine: Negative for cold intolerance, heat intolerance, polydipsia, polyphagia and polyuria. Severe sweating spells   Genitourinary: Negative for difficulty urinating, dysuria, flank pain, frequency, hematuria and urgency. Musculoskeletal: Positive for arthralgias and back pain. Negative for gait problem, joint swelling and myalgias. Right hip pain   Skin: Negative for color change, pallor and rash. Allergic/Immunologic: Negative for environmental allergies and food allergies. Neurological: Negative for dizziness, tremors, seizures, syncope, speech difficulty, weakness, light-headedness, numbness and headaches. Hematological: Negative for adenopathy. Does not bruise/bleed easily. Psychiatric/Behavioral: Negative for agitation, behavioral problems, confusion, sleep disturbance and suicidal ideas. The patient is not nervous/anxious.            Current Outpatient Medications:     diltiazem (DILT-XR) 120 MG extended release capsule, Take 1 capsule by mouth daily, Disp: 30 capsule, Rfl: 5    lisinopril (PRINIVIL;ZESTRIL) 10 MG tablet, Take 1 tablet

## 2020-06-23 ENCOUNTER — OFFICE VISIT (OUTPATIENT)
Dept: PRIMARY CARE CLINIC | Age: 50
End: 2020-06-23
Payer: MEDICARE

## 2020-06-23 ENCOUNTER — OFFICE VISIT (OUTPATIENT)
Dept: PRIMARY CARE CLINIC | Age: 50
End: 2020-06-23

## 2020-06-23 ENCOUNTER — HOSPITAL ENCOUNTER (OUTPATIENT)
Age: 50
Discharge: HOME | End: 2020-06-25
Payer: MEDICARE

## 2020-06-23 VITALS
WEIGHT: 176 LBS | HEIGHT: 59 IN | DIASTOLIC BLOOD PRESSURE: 72 MMHG | HEART RATE: 106 BPM | OXYGEN SATURATION: 97 % | TEMPERATURE: 97.1 F | SYSTOLIC BLOOD PRESSURE: 112 MMHG | BODY MASS INDEX: 35.48 KG/M2

## 2020-06-23 VITALS
HEART RATE: 106 BPM | SYSTOLIC BLOOD PRESSURE: 112 MMHG | TEMPERATURE: 97.1 F | HEIGHT: 59 IN | DIASTOLIC BLOOD PRESSURE: 72 MMHG | BODY MASS INDEX: 35.48 KG/M2 | OXYGEN SATURATION: 97 % | WEIGHT: 176 LBS

## 2020-06-23 PROBLEM — Z01.818 ENCOUNTER FOR PRE-OPERATIVE EXAMINATION: Status: RESOLVED | Noted: 2020-02-19 | Resolved: 2020-06-23

## 2020-06-23 LAB
ALBUMIN SERPL-MCNC: 4.3 G/DL (ref 3.5–5.2)
ALP BLD-CCNC: 99 U/L (ref 35–104)
ALT SERPL-CCNC: 17 U/L (ref 0–32)
ANION GAP SERPL CALCULATED.3IONS-SCNC: 13 MMOL/L (ref 7–16)
AST SERPL-CCNC: 28 U/L (ref 0–31)
BASOPHILS ABSOLUTE: 0.04 E9/L (ref 0–0.2)
BASOPHILS RELATIVE PERCENT: 0.4 % (ref 0–2)
BILIRUB SERPL-MCNC: 0.3 MG/DL (ref 0–1.2)
BUN BLDV-MCNC: 15 MG/DL (ref 6–20)
C-REACTIVE PROTEIN: 0.5 MG/DL (ref 0–0.4)
CALCIUM SERPL-MCNC: 9.3 MG/DL (ref 8.6–10.2)
CHLORIDE BLD-SCNC: 102 MMOL/L (ref 98–107)
CHOLESTEROL, TOTAL: 217 MG/DL (ref 0–199)
CO2: 23 MMOL/L (ref 22–29)
CREAT SERPL-MCNC: 0.6 MG/DL (ref 0.5–1)
EOSINOPHILS ABSOLUTE: 0.64 E9/L (ref 0.05–0.5)
EOSINOPHILS RELATIVE PERCENT: 7.1 % (ref 0–6)
GFR AFRICAN AMERICAN: >60
GFR NON-AFRICAN AMERICAN: >60 ML/MIN/1.73
GLUCOSE BLD-MCNC: 85 MG/DL (ref 74–99)
HCT VFR BLD CALC: 44.9 % (ref 34–48)
HDLC SERPL-MCNC: 37 MG/DL
HEMOGLOBIN: 14.4 G/DL (ref 11.5–15.5)
IMMATURE GRANULOCYTES #: 0.05 E9/L
IMMATURE GRANULOCYTES %: 0.6 % (ref 0–5)
LDL CHOLESTEROL CALCULATED: 154 MG/DL (ref 0–99)
LYMPHOCYTES ABSOLUTE: 2.13 E9/L (ref 1.5–4)
LYMPHOCYTES RELATIVE PERCENT: 23.5 % (ref 20–42)
MCH RBC QN AUTO: 29.7 PG (ref 26–35)
MCHC RBC AUTO-ENTMCNC: 32.1 % (ref 32–34.5)
MCV RBC AUTO: 92.6 FL (ref 80–99.9)
MONOCYTES ABSOLUTE: 0.8 E9/L (ref 0.1–0.95)
MONOCYTES RELATIVE PERCENT: 8.8 % (ref 2–12)
NEUTROPHILS ABSOLUTE: 5.41 E9/L (ref 1.8–7.3)
NEUTROPHILS RELATIVE PERCENT: 59.6 % (ref 43–80)
PDW BLD-RTO: 15.7 FL (ref 11.5–15)
PLATELET # BLD: 448 E9/L (ref 130–450)
PMV BLD AUTO: 10.8 FL (ref 7–12)
POTASSIUM SERPL-SCNC: 4 MMOL/L (ref 3.5–5)
RBC # BLD: 4.85 E12/L (ref 3.5–5.5)
SEDIMENTATION RATE, ERYTHROCYTE: 8 MM/HR (ref 0–20)
SODIUM BLD-SCNC: 138 MMOL/L (ref 132–146)
TOTAL PROTEIN: 7.3 G/DL (ref 6.4–8.3)
TRIGL SERPL-MCNC: 128 MG/DL (ref 0–149)
VLDLC SERPL CALC-MCNC: 26 MG/DL
WBC # BLD: 9.1 E9/L (ref 4.5–11.5)

## 2020-06-23 PROCEDURE — 80061 LIPID PANEL: CPT

## 2020-06-23 PROCEDURE — 99213 OFFICE O/P EST LOW 20 MIN: CPT | Performed by: INTERNAL MEDICINE

## 2020-06-23 PROCEDURE — 85025 COMPLETE CBC W/AUTO DIFF WBC: CPT

## 2020-06-23 PROCEDURE — 85651 RBC SED RATE NONAUTOMATED: CPT

## 2020-06-23 PROCEDURE — 36415 COLL VENOUS BLD VENIPUNCTURE: CPT

## 2020-06-23 PROCEDURE — 80053 COMPREHEN METABOLIC PANEL: CPT

## 2020-06-23 PROCEDURE — 86140 C-REACTIVE PROTEIN: CPT

## 2020-06-23 PROCEDURE — G0438 PPPS, INITIAL VISIT: HCPCS | Performed by: INTERNAL MEDICINE

## 2020-06-23 RX ORDER — TRIAMTERENE AND HYDROCHLOROTHIAZIDE 37.5; 25 MG/1; MG/1
1 TABLET ORAL DAILY
Qty: 30 TABLET | Refills: 5 | Status: SHIPPED
Start: 2020-06-23 | End: 2021-04-20 | Stop reason: SDUPTHER

## 2020-06-23 RX ORDER — DILTIAZEM HYDROCHLORIDE 120 MG/1
120 CAPSULE, EXTENDED RELEASE ORAL DAILY
Qty: 30 CAPSULE | Refills: 5 | Status: SHIPPED
Start: 2020-06-23 | End: 2020-11-25

## 2020-06-23 RX ORDER — TRIAMTERENE AND HYDROCHLOROTHIAZIDE 37.5; 25 MG/1; MG/1
1 TABLET ORAL DAILY
Qty: 30 TABLET | Refills: 5 | Status: CANCELLED | OUTPATIENT
Start: 2020-06-23

## 2020-06-23 RX ORDER — DILTIAZEM HYDROCHLORIDE 120 MG/1
120 CAPSULE, EXTENDED RELEASE ORAL DAILY
Qty: 30 CAPSULE | Refills: 5 | Status: CANCELLED | OUTPATIENT
Start: 2020-06-23

## 2020-06-23 RX ORDER — LISINOPRIL 10 MG/1
10 TABLET ORAL DAILY
Qty: 30 TABLET | Refills: 5 | Status: SHIPPED
Start: 2020-06-23 | End: 2021-01-05 | Stop reason: SDUPTHER

## 2020-06-23 RX ORDER — LEVOMILNACIPRAN HYDROCHLORIDE 120 MG/1
120 CAPSULE, EXTENDED RELEASE ORAL DAILY
Qty: 30 CAPSULE | Refills: 0 | Status: CANCELLED | OUTPATIENT
Start: 2020-06-23

## 2020-06-23 RX ORDER — LISINOPRIL 10 MG/1
10 TABLET ORAL DAILY
Qty: 30 TABLET | Refills: 5 | Status: CANCELLED | OUTPATIENT
Start: 2020-06-23

## 2020-06-23 ASSESSMENT — ENCOUNTER SYMPTOMS
VOMITING: 0
COLOR CHANGE: 0
EYE DISCHARGE: 0
FACIAL SWELLING: 0
NAUSEA: 0
CONSTIPATION: 0
TROUBLE SWALLOWING: 0
COUGH: 0
WHEEZING: 0
PHOTOPHOBIA: 0
BLOOD IN STOOL: 0
SORE THROAT: 0
STRIDOR: 0
SHORTNESS OF BREATH: 0
RHINORRHEA: 0
ABDOMINAL PAIN: 0
EYE PAIN: 0
ANAL BLEEDING: 0
EYE ITCHING: 0
DIARRHEA: 0

## 2020-06-23 ASSESSMENT — PATIENT HEALTH QUESTIONNAIRE - PHQ9
SUM OF ALL RESPONSES TO PHQ QUESTIONS 1-9: 0
1. LITTLE INTEREST OR PLEASURE IN DOING THINGS: 0
SUM OF ALL RESPONSES TO PHQ QUESTIONS 1-9: 0
2. FEELING DOWN, DEPRESSED OR HOPELESS: 0
SUM OF ALL RESPONSES TO PHQ9 QUESTIONS 1 & 2: 0

## 2020-06-23 NOTE — PROGRESS NOTES
2020    Name: Barrera Villareal : 1970 Sex: female  Age: 48 y.o. Subjective:  Chief Complaint   Patient presents with    Medication Refill    Follow-up        HPI     Patient had a right hip total arthroplasty on 2020. She developed a wound infection and was admitted to [facility] 2020 for drainage and wound VAC application. This was done by Dr. Nathan Rizzo. Dr. Lorena Dillard also saw her in consult. She was given antibiotics and discharged on IV Rocephin through PICC line. She has finished her course of IV antibiotics. She had a mixed wound infection with both anaerobic and aerobic organisms cultured. She still has some redness over the part of the incision. She denies any fever, chills, nausea or vomiting. She will be seeing Dr. Lorena Dillard in  follow-up in July. Patient has history of hypertension, nonrheumatic mitral valve regurgitation, depression, hyperlipidemia and arthritis. She sees psychiatrist who has her on Fetzima 120 mg daily. Review of Systems   Constitutional: Negative for appetite change, fatigue and unexpected weight change. HENT: Negative for congestion, ear pain, facial swelling, rhinorrhea, sore throat, tinnitus and trouble swallowing. Eyes: Negative for photophobia, pain, discharge, itching and visual disturbance. Respiratory: Negative for cough, shortness of breath, wheezing and stridor. Cardiovascular: Negative for chest pain, palpitations and leg swelling. Gastrointestinal: Negative for abdominal pain, anal bleeding, blood in stool, constipation, diarrhea, nausea and vomiting. Endocrine: Negative for cold intolerance, heat intolerance, polydipsia, polyphagia and polyuria. Genitourinary: Negative for difficulty urinating, dysuria, flank pain, frequency, hematuria and urgency. Musculoskeletal: Negative for arthralgias, gait problem, joint swelling and myalgias. Skin: Negative for color change, pallor and rash.         Which she says has improved over the incision   Allergic/Immunologic: Negative for environmental allergies and food allergies. Neurological: Negative for dizziness, tremors, seizures, syncope, speech difficulty, weakness, light-headedness, numbness and headaches. Hematological: Negative for adenopathy. Does not bruise/bleed easily. Psychiatric/Behavioral: Negative for agitation, behavioral problems, confusion, sleep disturbance and suicidal ideas. The patient is not nervous/anxious.            Current Outpatient Medications:     triamterene-hydroCHLOROthiazide (MAXZIDE-25) 37.5-25 MG per tablet, Take 1 tablet by mouth daily, Disp: 30 tablet, Rfl: 5    lisinopril (PRINIVIL;ZESTRIL) 10 MG tablet, Take 1 tablet by mouth daily, Disp: 30 tablet, Rfl: 5    dilTIAZem (DILT-XR) 120 MG extended release capsule, Take 1 capsule by mouth daily, Disp: 30 capsule, Rfl: 5    FETZIMA 120 MG CP24 extended release capsule, 1 capsule daily, Disp: , Rfl: 0     Allergies   Allergen Reactions    Bupropion Hives        Past Medical History:   Diagnosis Date    Essential hypertension 6/3/2019    Hip arthritis     right    Mixed hyperlipidemia 6/3/2019    Non-rheumatic mitral regurgitation 6/3/2019       Health Maintenance Due   Topic Date Due    Pneumococcal 0-64 years Vaccine (1 of 1 - PPSV23) 1976    HIV screen  1985    DTaP/Tdap/Td vaccine (1 - Tdap) 1989    Cervical cancer screen  1991    Shingles Vaccine (1 of 2) 2020    Colon cancer screen colonoscopy  2020        Patient Active Problem List   Diagnosis    Non-rheumatic mitral regurgitation    Mixed hyperlipidemia    Essential hypertension    Depression    Thyromegaly    Pain of right hip joint    Hip arthritis        Past Surgical History:   Procedure Laterality Date     SECTION      three times    HIP ARTHROPLASTY Left         Family History   Problem Relation Age of Onset    Heart Disease Father         Social History Tobacco Use    Smoking status: Current Every Day Smoker     Packs/day: 1.00     Years: 30.00     Pack years: 30.00     Start date: 1980    Smokeless tobacco: Never Used   Substance Use Topics    Alcohol use: Not Currently    Drug use: Never        Objective  Vitals:    06/23/20 0755   BP: 112/72   Site: Left Upper Arm   Position: Sitting   Cuff Size: Large Adult   Pulse: 106   Temp: 97.1 °F (36.2 °C)   TempSrc: Temporal   SpO2: 97%   Weight: 176 lb (79.8 kg)   Height: 4' 11\" (1.499 m)        Exam:  Physical Exam  Vitals signs reviewed. Constitutional:       General: She is not in acute distress. Appearance: She is well-developed. She is obese. She is not ill-appearing. HENT:      Head: Normocephalic. Right Ear: External ear normal.      Left Ear: External ear normal.   Eyes:      General: No scleral icterus. Right eye: No discharge. Left eye: No discharge. Extraocular Movements: Extraocular movements intact. Conjunctiva/sclera: Conjunctivae normal.      Pupils: Pupils are equal, round, and reactive to light. Neck:      Musculoskeletal: Normal range of motion and neck supple. Thyroid: No thyromegaly. Cardiovascular:      Rate and Rhythm: Normal rate and regular rhythm. Heart sounds: Normal heart sounds. No murmur. No friction rub. No gallop. Pulmonary:      Effort: Pulmonary effort is normal. No respiratory distress. Breath sounds: Normal breath sounds. No wheezing or rales. Chest:      Chest wall: No tenderness. Abdominal:      General: Bowel sounds are normal. There is no distension. Palpations: Abdomen is soft. There is no mass. Tenderness: There is no abdominal tenderness. There is no guarding or rebound. Musculoskeletal: Normal range of motion. General: No tenderness or deformity. Lymphadenopathy:      Cervical: No cervical adenopathy. Skin:     General: Skin is warm and dry. Coloration: Skin is not pale. Sedimentation Rate           Return in about 3 months (around 9/23/2020).        Tommy Lentz, DO  6/23/2020

## 2020-06-23 NOTE — PROGRESS NOTES
nutritional/weight issues at this time    Safety:  Safety  Do you have working smoke detectors?: Yes  Have all throw rugs been removed or fastened?: (!) No  Do you have non-slip mats or surfaces in all bathtubs/showers?: Yes  Do all of your stairways have a railing or banister?: Yes  Are your doorways, halls and stairs free of clutter?: Yes  Do you always fasten your seatbelt when you are in a car?: Yes  Safety Interventions:  · Home safety tips provided    Personalized Preventive Plan   Current Health Maintenance Status  Immunization History   Administered Date(s) Administered    Influenza A ([id number]) Vaccine PF IM 10/23/2009    Influenza Vaccine, unspecified formulation 09/22/2018    Influenza Virus Vaccine 09/22/2018    Influenza, Ashley Be, IM, PF (6 mo and older Fluzone, Flulaval, Fluarix, and 3 yrs and older Afluria) 09/22/2018, 09/05/2019        Health Maintenance   Topic Date Due    Pneumococcal 0-64 years Vaccine (1 of 1 - PPSV23) 01/25/1976    HIV screen  01/25/1985    DTaP/Tdap/Td vaccine (1 - Tdap) 01/25/1989    Cervical cancer screen  01/25/1991    Annual Wellness Visit (AWV)  05/29/2019    Shingles Vaccine (1 of 2) 01/25/2020    Colon cancer screen colonoscopy  01/25/2020    Breast cancer screen  02/14/2021    Potassium monitoring  04/17/2021    Creatinine monitoring  05/25/2021    Lipid screen  01/14/2025    Flu vaccine  Completed    Hepatitis A vaccine  Aged Out    Hepatitis B vaccine  Aged Out    Hib vaccine  Aged Out    Meningococcal (ACWY) vaccine  Aged Out     Recommendations for [employer] Due: see orders and patient instructions/AVS.  . Recommended screening schedule for the next 5-10 years is provided to the patient in written form: see Patient Instructions/AVS.    Prasanth Guthrie was seen today for medicare awv.     Diagnoses and all orders for this visit:    Essential hypertension                Advance Care Planning   Advanced Care Planning: Discussed the patients choices

## 2020-06-23 NOTE — PATIENT INSTRUCTIONS
Learning About Medical Power of   What is a medical power of ? A medical power of , also called a durable power of  for health care, is one type of the legal forms called advance directives. It lets you name the person you want to make treatment decisions for you if you can't speak or decide for yourself. The person you choose is called your health care agent. This person is also called a health care proxy or health care surrogate. A medical power of  may be called something else in your state. How do you choose a health care agent? Choose your health care agent carefully. This person may or may not be a family member. Talk to the person before you make your final decision. Make sure he or she is comfortable with this responsibility. It's a good idea to choose someone who:  · Is at least 25years old. · Knows you well and understands what makes life meaningful for you. · Understands your Sabianism and moral values. · Will do what you want, not what he or she wants. · Will be able to make difficult choices at a stressful time. · Will be able to refuse or stop treatment, if that is what you would want, even if you could die. · Will be firm and confident with health professionals if needed. · Will ask questions to get needed information. · Lives near you or agrees to travel to you if needed. Your family may help you make medical decisions while you can still be part of that process. But it's important to choose one person to be your health care agent in case you aren't able to make decisions for yourself. If you don't fill out the legal form and name a health care agent, the decisions your family can make may be limited. A health care agent may be called something else in your state. Who will make decisions for you if you don't have a health care agent? If you don't have a health care agent or a living will, you may not get the care you want. Decisions may be made by family members who disagree about your medical care. Or decisions may be made by a medical professional who doesn't know you well. In some cases, a  makes the decisions. When you name a health care agent, it is very clear who has the power to make health decisions for you. How do you name a health care agent? You name your health care agent on a legal form. This form is usually called a medical power of . Ask your hospital, state bar association, or office on aging where to find these forms. You must sign the form to make it legal. Some states require you to get the form notarized. This means that a person called a  watches you sign the form and then he or she signs the form. Some states also require that two or more witnesses sign the form. Be sure to tell your family members and doctors who your health care agent is. Where can you learn more? Go to https://Bankfeeinsider.compepiceweb.[employer]. org and sign in to your [employer] account. Enter [id number] in the [employer] [place] [employer] box to learn more about \"Learning About [address]. \"     If you do not have an account, please click on the \"Sign Up Now\" link. Current as of: December 9, 2019               Content Version: 12.5  © 1116-9323 Healthwise, Incorporated. Care instructions adapted under license by [facility] ([facility]). If you have questions about a medical condition or this instruction, always ask your healthcare professional. [place] [address] any warranty or liability for your use of this information. Learning About Living [place]roy  What is a living will? A living will, also called a declaration, is a legal form. It tells your family and your doctor your wishes when you can't speak for yourself. It's used by the health professionals who will treat you as you near the end of your life or if you get seriously hurt or ill.   If you put your wishes in writing, your loved ones and exercise per week or 10,000 steps per day on a pedometer . · Order or download the FREE \"Exercise & Physical Activity: Your Everyday Guide\" from [employer]. Call 4-876.227.5080 or search [employer] online. · You need 3220-7970 mg of calcium and 3634-2881 IU of vitamin D per day. It is possible to meet your calcium requirement with diet alone, but a vitamin D supplement is usually necessary to meet this goal.  · When exposed to the sun, use a sunscreen that protects against both UVA and UVB radiation with an SPF of 30 or greater. Reapply every 2 to 3 hours or after sweating, drying off with a towel, or swimming. · Always wear a seat belt when traveling in a car. Always wear a helmet when riding a bicycle or motorcycle.

## 2020-11-23 ENCOUNTER — OFFICE VISIT (OUTPATIENT)
Dept: PRIMARY CARE CLINIC | Age: 50
End: 2020-11-23
Payer: MEDICARE

## 2020-11-23 VITALS
OXYGEN SATURATION: 98 % | HEIGHT: 59 IN | TEMPERATURE: 98.5 F | WEIGHT: 185 LBS | HEART RATE: 87 BPM | DIASTOLIC BLOOD PRESSURE: 72 MMHG | BODY MASS INDEX: 37.29 KG/M2 | SYSTOLIC BLOOD PRESSURE: 134 MMHG

## 2020-11-23 DIAGNOSIS — E05.90 HYPERTHYROIDISM: ICD-10-CM

## 2020-11-23 PROBLEM — M16.10 HIP ARTHRITIS: Status: RESOLVED | Noted: 2020-02-19 | Resolved: 2020-11-23

## 2020-11-23 PROBLEM — E66.01 MORBIDLY OBESE (HCC): Status: ACTIVE | Noted: 2020-11-23

## 2020-11-23 LAB
T3 FREE: 4.8 PG/ML (ref 2–4.4)
T4 FREE: 1.53 NG/DL (ref 0.93–1.7)

## 2020-11-23 PROCEDURE — G8427 DOCREV CUR MEDS BY ELIG CLIN: HCPCS | Performed by: INTERNAL MEDICINE

## 2020-11-23 PROCEDURE — G8482 FLU IMMUNIZE ORDER/ADMIN: HCPCS | Performed by: INTERNAL MEDICINE

## 2020-11-23 PROCEDURE — 99214 OFFICE O/P EST MOD 30 MIN: CPT | Performed by: INTERNAL MEDICINE

## 2020-11-23 PROCEDURE — 3017F COLORECTAL CA SCREEN DOC REV: CPT | Performed by: INTERNAL MEDICINE

## 2020-11-23 PROCEDURE — G8417 CALC BMI ABV UP PARAM F/U: HCPCS | Performed by: INTERNAL MEDICINE

## 2020-11-23 PROCEDURE — 1036F TOBACCO NON-USER: CPT | Performed by: INTERNAL MEDICINE

## 2020-11-23 ASSESSMENT — ENCOUNTER SYMPTOMS
RHINORRHEA: 0
STRIDOR: 0
ABDOMINAL PAIN: 0
NAUSEA: 0
WHEEZING: 0
CONSTIPATION: 0
COLOR CHANGE: 0
EYE ITCHING: 0
ANAL BLEEDING: 0
TROUBLE SWALLOWING: 0
EYE PAIN: 0
SHORTNESS OF BREATH: 0
FACIAL SWELLING: 0
BLOOD IN STOOL: 0
SORE THROAT: 0
COUGH: 0
EYE DISCHARGE: 0
PHOTOPHOBIA: 0
VOMITING: 0
DIARRHEA: 0

## 2020-11-23 NOTE — PROGRESS NOTES
2020    Name: Jane Bryant : 1970 Sex: female  Age: 48 y.o. Subjective:  Chief Complaint   Patient presents with   [address]        HPI     Patient has been complaining of hot flashes with severe diaphoresis for little over a year. At first we thought it might be medication she was just started on her antihypertensives and diltiazem at the time but her cardiologist did not feel it was that. He felt might be secondary to menopausal state. She then saw her gynecologist Dr. Nichol Stover who did some blood work on her and indeed she is postmenopausal her [address] is 56.8 and her estradiol level is less than 5. All of this is consistent with postmenopausal state. However her TSH was low at 0.137. No previous history of Hashimoto's disease. Patient also complains of feeling jittery inside. No weight loss. No palpitations but she is on a calcium channel blocker. Her blood pressures have been adequate. Patient has history of hypertension, nonrheumatic mitral valve regurgitation, depression, hyperlipidemia and arthritis. She sees psychiatrist who has her on Fetzima 120 mg daily. I reviewed her report from cardiologist who felt that if she needed a low dose hormone replacement that she could take it. She quit smoking this year. She does not know when her last menstrual period was because she has an IUD. Review of Systems   Constitutional: Positive for diaphoresis. Negative for appetite change, fatigue and unexpected weight change. HENT: Negative for congestion, ear pain, facial swelling, rhinorrhea, sore throat, tinnitus and trouble swallowing. Eyes: Negative for photophobia, pain, discharge, itching and visual disturbance. Respiratory: Negative for cough, shortness of breath, wheezing and stridor. Cardiovascular: Negative for chest pain, palpitations and leg swelling.    Gastrointestinal: Negative for abdominal pain, anal bleeding, blood in stool, constipation, diarrhea, nausea Depression    Thyromegaly    Pain of right hip joint    Morbidly obese ([address]    Hyperthyroidism        Past Surgical History:   Procedure Laterality Date     SECTION      three times    HIP ARTHROPLASTY Left         Family History   Problem Relation Age of Onset    Heart Disease Father         Social History     Tobacco Use    Smoking status: Former Smoker     Packs/day: 1.00     Years: 30.00     Pack years: 30.00     Start date:     Smokeless tobacco: Never Used   Substance Use Topics    Alcohol use: Not Currently    Drug use: Never        Objective  Vitals:    20 1149   BP: 134/72   Site: Right Upper Arm   Position: Sitting   Cuff Size: Medium Adult   Pulse: 87   Temp: 98.5 °F (36.9 °C)   TempSrc: Temporal   SpO2: 98%   Weight: 185 lb (83.9 kg)   Height: 4' 11\" (1.499 m)        Exam:  Physical Exam  Vitals signs reviewed. Constitutional:       General: She is not in acute distress. Appearance: She is well-developed. She is obese. She is not ill-appearing. HENT:      Head: Normocephalic. Right Ear: External ear normal.      Left Ear: External ear normal.   Eyes:      General: No scleral icterus. Right eye: No discharge. Left eye: No discharge. Extraocular Movements: Extraocular movements intact. Conjunctiva/sclera: Conjunctivae normal.      Pupils: Pupils are equal, round, and reactive to light. Neck:      Musculoskeletal: Normal range of motion and neck supple. Thyroid: No thyromegaly. Cardiovascular:      Rate and Rhythm: Normal rate and regular rhythm. Heart sounds: Normal heart sounds. No murmur. No friction rub. No gallop. Pulmonary:      Effort: Pulmonary effort is normal. No respiratory distress. Breath sounds: Normal breath sounds. No wheezing or rales. Chest:      Chest wall: No tenderness. Abdominal:      General: Bowel sounds are normal. There is no distension. Palpations: Abdomen is soft. There is no mass. Tenderness: There is no abdominal tenderness. There is no guarding or rebound. Musculoskeletal: Normal range of motion. General: No tenderness or deformity. Lymphadenopathy:      Cervical: No cervical adenopathy. Skin:     General: Skin is warm and dry. Coloration: Skin is not pale. Findings: No erythema or rash. Neurological:      Mental Status: She is alert and oriented to person, place, and time. Cranial Nerves: No cranial nerve deficit. Sensory: No sensory deficit. Deep Tendon Reflexes: Reflexes normal.   Psychiatric:         Behavior: Behavior normal.         Thought Content: Thought content normal.         Judgment: Judgment normal.          Last labs reviewed. ASSESSMENT & PLAN :   Problem List        Circulatory    Non-rheumatic mitral regurgitation     Continue follow-up with cardiology         Essential hypertension     Blood pressures are stable. Continue medications and monitor blood pressures at home. Call office if systolics are over 276 over diastolics over 90. Relevant Medications    triamterene-hydroCHLOROthiazide (MAXZIDE-25) 37.5-25 MG per tablet    lisinopril (PRINIVIL;ZESTRIL) 10 MG tablet    dilTIAZem (DILT-XR) 120 MG extended release capsule       Endocrine    Hyperthyroidism - Primary     Check free T4 and free T3. Check thyroid antibodies. Check nuclear medicine thyroid uptake and scan and see me post test.  Consideration toward starting methimazole after the tests are drawn.   Referral to endocrinology after get the test back         Relevant Orders    T3, FREE    T4, Free    NM THYROID UPTAKE AND SCAN    THYROID AB       Other    Mixed hyperlipidemia     Watch saturated fats in diet and will monitor lipids         Relevant Medications    triamterene-hydroCHLOROthiazide (MAXZIDE-25) 37.5-25 MG per tablet    lisinopril (PRINIVIL;ZESTRIL) 10 MG tablet    dilTIAZem (DILT-XR) 120 MG extended release capsule    Depression     Continue Fetzima         Relevant Medications    FETZIMA 120 MG CP24 extended release capsule    Morbidly obese ([address]     Recommendedshetryandloseweight watch portion and type of food that she eats. Increase her exercise                Return in about 3 weeks (around 12/14/2020).        Michelle Mccain, DO  11/23/2020

## 2020-11-23 NOTE — ASSESSMENT & PLAN NOTE
Blood pressures are stable. Continue medications and monitor blood pressures at home. Call office if systolics are over 283 over diastolics over 90.

## 2020-11-23 NOTE — ASSESSMENT & PLAN NOTE
Check free T4 and free T3. Check thyroid antibodies. Check nuclear medicine thyroid uptake and scan and see me post test.  Consideration toward starting methimazole after the tests are drawn.   Referral to endocrinology after get the test back

## 2020-11-24 ENCOUNTER — TELEPHONE (OUTPATIENT)
Dept: PRIMARY CARE CLINIC | Age: 50
End: 2020-11-24

## 2020-11-25 RX ORDER — ATENOLOL 50 MG/1
50 TABLET ORAL DAILY
Qty: 30 TABLET | Refills: 3 | Status: SHIPPED
Start: 2020-11-25 | End: 2021-04-12 | Stop reason: SDUPTHER

## 2020-11-25 NOTE — TELEPHONE ENCOUNTER
When you contact patient tell her that I discontinued her diltiazem and started her on atenolol 50 mg once a day as this will help both the heart and her symptoms of hyperthyroidism.

## 2020-11-25 NOTE — TELEPHONE ENCOUNTER
Free T3 levels elevated. Rest of blood work still not present. Will refer to [facility] endocrinologist in [facility]. We will send him copies of all blood work.   We will continue to work-up

## 2020-11-26 LAB
THYROGLOBULIN AB: <0.9 IU/ML (ref 0–4)
THYROID PEROXIDASE (TPO) ABS: 2.2 IU/ML (ref 0–9)

## 2020-12-17 DIAGNOSIS — E05.90 HYPERTHYROIDISM: ICD-10-CM

## 2020-12-22 LAB — THYROID STIMULATING IMMUNOGLOBULIN: <0.1 IU/L

## 2020-12-28 ENCOUNTER — OFFICE VISIT (OUTPATIENT)
Dept: PRIMARY CARE CLINIC | Age: 50
End: 2020-12-28
Payer: MEDICARE

## 2020-12-28 VITALS
OXYGEN SATURATION: 97 % | BODY MASS INDEX: 37.37 KG/M2 | HEART RATE: 87 BPM | HEIGHT: 59 IN | DIASTOLIC BLOOD PRESSURE: 68 MMHG | SYSTOLIC BLOOD PRESSURE: 120 MMHG | TEMPERATURE: 98.2 F

## 2020-12-28 PROBLEM — F41.9 ANXIETY: Status: ACTIVE | Noted: 2020-12-28

## 2020-12-28 PROBLEM — M25.551 PAIN OF RIGHT HIP JOINT: Status: RESOLVED | Noted: 2020-01-14 | Resolved: 2020-12-28

## 2020-12-28 PROBLEM — M25.569 ARTHRALGIA OF KNEE: Status: ACTIVE | Noted: 2020-12-28

## 2020-12-28 PROCEDURE — G8417 CALC BMI ABV UP PARAM F/U: HCPCS | Performed by: INTERNAL MEDICINE

## 2020-12-28 PROCEDURE — G8427 DOCREV CUR MEDS BY ELIG CLIN: HCPCS | Performed by: INTERNAL MEDICINE

## 2020-12-28 PROCEDURE — 99214 OFFICE O/P EST MOD 30 MIN: CPT | Performed by: INTERNAL MEDICINE

## 2020-12-28 PROCEDURE — 1036F TOBACCO NON-USER: CPT | Performed by: INTERNAL MEDICINE

## 2020-12-28 PROCEDURE — 3017F COLORECTAL CA SCREEN DOC REV: CPT | Performed by: INTERNAL MEDICINE

## 2020-12-28 PROCEDURE — G8482 FLU IMMUNIZE ORDER/ADMIN: HCPCS | Performed by: INTERNAL MEDICINE

## 2020-12-28 RX ORDER — CLONAZEPAM 0.5 MG/1
0.5 TABLET ORAL 3 TIMES DAILY PRN
Qty: 60 TABLET | Refills: 3 | Status: SHIPPED
Start: 2020-12-28 | End: 2021-01-28 | Stop reason: SDUPTHER

## 2020-12-28 RX ORDER — MELOXICAM 7.5 MG/1
7.5 TABLET ORAL DAILY
Qty: 30 TABLET | Refills: 5 | Status: SHIPPED
Start: 2020-12-28 | End: 2021-09-07 | Stop reason: SDUPTHER

## 2020-12-28 ASSESSMENT — ENCOUNTER SYMPTOMS
ANAL BLEEDING: 0
ABDOMINAL PAIN: 0
BLOOD IN STOOL: 0
COUGH: 0
COLOR CHANGE: 0
WHEEZING: 0
EYE DISCHARGE: 0
SORE THROAT: 0
EYE ITCHING: 0
RHINORRHEA: 0
SHORTNESS OF BREATH: 0
DIARRHEA: 0
NAUSEA: 0
VOMITING: 0
FACIAL SWELLING: 0
TROUBLE SWALLOWING: 0
EYE PAIN: 0
CONSTIPATION: 0
PHOTOPHOBIA: 0
STRIDOR: 0

## 2020-12-28 NOTE — ASSESSMENT & PLAN NOTE
Clonazepam 0.5 mg twice daily as needed anxiety. Check with psychiatrist to make sure is okay for her to use this.   [facility] report reviewed and she had 1 prescription for Percocet that was filled in March 2020 after her hip surgery

## 2020-12-28 NOTE — ASSESSMENT & PLAN NOTE
Trial of meloxicam 7.5 mg daily with food, can increase to 15 mg if insufficient relief is obtained with a lower dose

## 2020-12-28 NOTE — PROGRESS NOTES
Hematological: Negative for adenopathy. Does not bruise/bleed easily. Psychiatric/Behavioral: Negative for agitation, behavioral problems, confusion, sleep disturbance and suicidal ideas. The patient is nervous/anxious. Current Outpatient Medications:     clonazePAM (KLONOPIN) 0.5 MG tablet, Take 1 tablet by mouth 3 times daily as needed for Anxiety for up to 30 days. , Disp: 60 tablet, Rfl: 3    meloxicam (MOBIC) 7.5 MG tablet, Take 1 tablet by mouth daily, Disp: 30 tablet, Rfl: 5    atenolol (TENORMIN) 50 MG tablet, Take 1 tablet by mouth daily, Disp: 30 tablet, Rfl: 3    triamterene-hydroCHLOROthiazide (MAXZIDE-25) 37.5-25 MG per tablet, Take 1 tablet by mouth daily, Disp: 30 tablet, Rfl: 5    lisinopril (PRINIVIL;ZESTRIL) 10 MG tablet, Take 1 tablet by mouth daily, Disp: 30 tablet, Rfl: 5    FETZIMA 120 MG CP24 extended release capsule, 1 capsule daily, Disp: , Rfl: 0     Allergies   Allergen Reactions    Bupropion Hives        Past Medical History:   Diagnosis Date    Essential hypertension 6/3/2019    Hip arthritis     right    Mixed hyperlipidemia 6/3/2019    Non-rheumatic mitral regurgitation 6/3/2019    Pain of right hip joint 2020       Health Maintenance Due   Topic Date Due    Hepatitis C screen  1970    HIV screen  1985    DTaP/Tdap/Td vaccine (1 - Tdap) 1989    Cervical cancer screen  1991    Colon cancer screen colonoscopy  2020    TSH testing  2020        Patient Active Problem List   Diagnosis    Non-rheumatic mitral regurgitation    Mixed hyperlipidemia    Essential hypertension    Depression    Morbidly obese ([address]    Hyperthyroidism    Anxiety    Arthralgia of knee        Past Surgical History:   Procedure Laterality Date     SECTION      three times    HIP ARTHROPLASTY Left         Family History   Problem Relation Age of Onset    Heart Disease Father         Social History     Tobacco Use  Smoking status: Former Smoker     Packs/day: 1.00     Years: 30.00     Pack years: 30.00     Start date: 1980    Smokeless tobacco: Never Used   Substance Use Topics    Alcohol use: Not Currently    Drug use: Never        Objective  Vitals:    12/28/20 0807   BP: 120/68   Site: Right Upper Arm   Position: Sitting   Cuff Size: Medium Adult   Pulse: 87   Temp: 98.2 °F (36.8 °C)   TempSrc: Temporal   SpO2: 97%   Weight: Comment: refused   Height: 4' 11\" (1.499 m)        Exam:  Physical Exam  Vitals signs reviewed. Constitutional:       General: She is not in acute distress. Appearance: She is well-developed. She is obese. She is not ill-appearing. HENT:      Head: Normocephalic. Right Ear: External ear normal.      Left Ear: External ear normal.   Eyes:      General: No scleral icterus. Right eye: No discharge. Left eye: No discharge. Extraocular Movements: Extraocular movements intact. Conjunctiva/sclera: Conjunctivae normal.      Pupils: Pupils are equal, round, and reactive to light. Neck:      Musculoskeletal: Normal range of motion and neck supple. Thyroid: No thyromegaly. Cardiovascular:      Rate and Rhythm: Normal rate and regular rhythm. Heart sounds: Normal heart sounds. No murmur. No friction rub. No gallop. Pulmonary:      Effort: Pulmonary effort is normal. No respiratory distress. Breath sounds: Normal breath sounds. No wheezing or rales. Chest:      Chest wall: No tenderness. Abdominal:      General: Bowel sounds are normal. There is no distension. Palpations: Abdomen is soft. There is no mass. Tenderness: There is no abdominal tenderness. There is no guarding or rebound. Musculoskeletal: Normal range of motion. General: No tenderness or deformity. Lymphadenopathy:      Cervical: No cervical adenopathy. Skin:     General: Skin is warm and dry. Coloration: Skin is not pale. Findings: No erythema or rash. Neurological:      Mental Status: She is alert and oriented to person, place, and time. Cranial Nerves: No cranial nerve deficit. Sensory: No sensory deficit. Deep Tendon Reflexes: Reflexes normal.   Psychiatric:         Behavior: Behavior normal.         Thought Content: Thought content normal.         Judgment: Judgment normal.          Last labs reviewed. ASSESSMENT & PLAN :   Problem List        Circulatory    Non-rheumatic mitral regurgitation     Follow-up with cardiologist as directed         Essential hypertension     Continue atenolol 50 mg. Monitor blood pressure and pulse at home         Relevant Medications    triamterene-hydroCHLOROthiazide (MAXZIDE-25) 37.5-25 MG per tablet    lisinopril (PRINIVIL;ZESTRIL) 10 MG tablet       Endocrine    Hyperthyroidism - Primary     Refer to endocrinologist hopefully he can see her soon         Relevant Medications    atenolol (TENORMIN) 50 MG tablet    Other Relevant Orders    Binh Cervantes MD, Endocrinology, [place]       Other    Mixed hyperlipidemia     Watch saturated fats in diet and will monitor lipids         Relevant Medications    triamterene-hydroCHLOROthiazide (MAXZIDE-25) 37.5-25 MG per tablet    lisinopril (PRINIVIL;ZESTRIL) 10 MG tablet    atenolol (TENORMIN) 50 MG tablet    Depression     Continue Fetzima and follow-up with her psychiatrist         Relevant Medications    FETZIMA 120 MG CP24 extended release capsule    Anxiety     Clonazepam 0.5 mg twice daily as needed anxiety. Check with psychiatrist to make sure is okay for her to use this.   OARRS report reviewed and she had 1 prescription for Percocet that was filled in March 2020 after her hip surgery         Relevant Medications    FETZIMA 120 MG CP24 extended release capsule    clonazePAM (KLONOPIN) 0.5 MG tablet    Arthralgia of knee Trial of meloxicam 7.5 mg daily with food, can increase to 15 mg if insufficient relief is obtained with a lower dose         Relevant Medications    meloxicam (MOBIC) 7.5 MG tablet           Return in about 1 month (around 1/28/2021), or hyperthyroidism.        Mercedez Pearce,   12/28/2020

## 2021-01-04 ENCOUNTER — HOSPITAL ENCOUNTER (OUTPATIENT)
Age: 51
Discharge: HOME | End: 2021-01-04
Payer: MEDICARE

## 2021-01-04 ENCOUNTER — TELEPHONE (OUTPATIENT)
Dept: ENDOCRINOLOGY | Age: 51
End: 2021-01-04

## 2021-01-04 ENCOUNTER — OFFICE VISIT (OUTPATIENT)
Dept: ENDOCRINOLOGY | Age: 51
End: 2021-01-04
Payer: MEDICARE

## 2021-01-04 VITALS
HEART RATE: 100 BPM | SYSTOLIC BLOOD PRESSURE: 122 MMHG | OXYGEN SATURATION: 98 % | WEIGHT: 193.8 LBS | BODY MASS INDEX: 39.14 KG/M2 | TEMPERATURE: 97.1 F | DIASTOLIC BLOOD PRESSURE: 70 MMHG

## 2021-01-04 DIAGNOSIS — E55.9 VITAMIN D DEFICIENCY: ICD-10-CM

## 2021-01-04 DIAGNOSIS — E05.90 HYPERTHYROIDISM: ICD-10-CM

## 2021-01-04 DIAGNOSIS — E05.90 HYPERTHYROIDISM: Primary | ICD-10-CM

## 2021-01-04 LAB
ANION GAP SERPL CALCULATED.3IONS-SCNC: 7 MMOL/L (ref 7–16)
BUN BLDV-MCNC: 16 MG/DL (ref 6–20)
CALCIUM SERPL-MCNC: 9.9 MG/DL (ref 8.6–10.2)
CHLORIDE BLD-SCNC: 102 MMOL/L (ref 98–107)
CO2: 25 MMOL/L (ref 22–29)
CREAT SERPL-MCNC: 0.6 MG/DL (ref 0.5–1)
GFR AFRICAN AMERICAN: >60
GFR NON-AFRICAN AMERICAN: >60 ML/MIN/1.73
GLUCOSE BLD-MCNC: 101 MG/DL (ref 74–99)
POTASSIUM SERPL-SCNC: 4.9 MMOL/L (ref 3.5–5)
SODIUM BLD-SCNC: 134 MMOL/L (ref 132–146)
T3 TOTAL: 116.1 NG/DL (ref 80–200)
T4 FREE: 1.38 NG/DL (ref 0.93–1.7)
T4 TOTAL: 9 MCG/DL (ref 4.5–11.7)
TSH SERPL DL<=0.05 MIU/L-ACNC: 0.02 UIU/ML (ref 0.27–4.2)
VITAMIN D 25-HYDROXY: 24 NG/ML (ref 30–100)

## 2021-01-04 PROCEDURE — 84443 ASSAY THYROID STIM HORMONE: CPT

## 2021-01-04 PROCEDURE — 36415 COLL VENOUS BLD VENIPUNCTURE: CPT

## 2021-01-04 PROCEDURE — 82306 VITAMIN D 25 HYDROXY: CPT

## 2021-01-04 PROCEDURE — 80048 BASIC METABOLIC PNL TOTAL CA: CPT

## 2021-01-04 PROCEDURE — 84480 ASSAY TRIIODOTHYRONINE (T3): CPT

## 2021-01-04 PROCEDURE — 84439 ASSAY OF FREE THYROXINE: CPT

## 2021-01-04 PROCEDURE — 99204 OFFICE O/P NEW MOD 45 MIN: CPT | Performed by: INTERNAL MEDICINE

## 2021-01-04 RX ORDER — METHIMAZOLE 5 MG/1
TABLET ORAL
Qty: 30 TABLET | Refills: 5 | Status: SHIPPED
Start: 2021-01-04 | End: 2021-04-28 | Stop reason: ALTCHOICE

## 2021-01-04 NOTE — PROGRESS NOTES
[address] [place] [address] Department of Endocrinology Diabetes and Metabolism   [address] [facility] [address]   Phone: [phone]  Fax: [phone]    Date of Service: 2021    Primary Care Physician: Yung Tirado DO  Referring physician: Racquel Lora DO  Provider: Reggie Fischer MD    Reason for the visit:  Hyperthyroidism    History of Present Illness: The history is provided by the patient. No  was used. Accuracy of the patient data is excellent. Zackery Schultz is a very pleasant 48 y.o. female seen today for evaluation and management of hyperthyroidism     Zackery Schultz was diagnosed with hyperthyroidism in 2020   Labs 2020 - free T3 4.8 (H), free T4 1.53, TPO-Ab negative, Tg-Ab negative, TSI <0.1     Thyroid uptake and scan 2020:   1.  Thyroid gland is not visualized during examination.      2.  24 hour radioiodine uptake of 0.7 percent (normal range: 10-30%).  This is abnormally low. Patient denied any history of  palpitations, swelling in the area of the thyroid gland, weight loss, change in appetite, nervousness, anxiety, irritability, tremor, sweating, heat intolerance, changes in bowel habits, muscle weakness or difficulty sleeping. Patient also denied any h/o unexplained weight gain, new fatigue, increased sensitivity to cold, dry skin, brittle fingernails, brittle hair, facial swelling/puffiness, or depressed mood. No FH of thyroid disease     PAST MEDICAL HISTORY   Past Medical History:   Diagnosis Date    Essential hypertension 6/3/2019    Hip arthritis     right    Mixed hyperlipidemia 6/3/2019    Non-rheumatic mitral regurgitation 6/3/2019    Pain of right hip joint 2020       PAST SURGICAL HISTORY   Past Surgical History:   Procedure Laterality Date     SECTION      three times    HIP ARTHROPLASTY Left        SOCIAL HISTORY   Tobacco:   reports that she has quit smoking.  She started smoking about 41 years ago. She has a 30.00 pack-year smoking history. She has never used smokeless tobacco.  Alcohol:   reports previous alcohol use. Drugs:   reports no history of drug use. FAMILY HISTORY   Family History   Problem Relation Age of Onset    Heart Disease Father        ALLERGIES AND DRUG REACTIONS   Allergies   Allergen Reactions    Bupropion Hives       CURRENT MEDICATIONS   Current Outpatient Medications   Medication Sig Dispense Refill    clonazePAM (KLONOPIN) 0.5 MG tablet Take 1 tablet by mouth 3 times daily as needed for Anxiety for up to 30 days. 60 tablet 3    meloxicam (MOBIC) 7.5 MG tablet Take 1 tablet by mouth daily 30 tablet 5    atenolol (TENORMIN) 50 MG tablet Take 1 tablet by mouth daily 30 tablet 3    triamterene-hydroCHLOROthiazide (MAXZIDE-25) 37.5-25 MG per tablet Take 1 tablet by mouth daily 30 tablet 5    lisinopril (PRINIVIL;ZESTRIL) 10 MG tablet Take 1 tablet by mouth daily 30 tablet 5    FETZIMA 120 MG CP24 extended release capsule 1 capsule daily  0     No current facility-administered medications for this visit. Review of Systems  Constitutional: No fever, no chills, no diaphoresis, no generalized weakness. HEENT: No blurred vision, No sore throat, no ear pain, no hair loss  Neck: denied any neck swelling, difficulty swallowing,   Cadrdiopulomary: No CP, SOB or palpitation, No orthopnea or PND. No cough or wheezing. GI: No N/V/D, no constipation, No abdominal pain, no melena or hematochezia   : Denied any dysuria, hematuria, flank pain, discharge, or incontinence. Skin: denied any rash, ulcer, Hirsute, or hyperpigmentation. MSK: denied any joint deformity, joint pain/swelling, muscle pain, or back pain.   Neuro: no numbess, no tingling, no weakness,     OBJECTIVE    /70   Pulse 100   Temp 97.1 °F (36.2 °C)   Wt 193 lb 12.8 oz (87.9 kg)   SpO2 98%   BMI 39.14 kg/m²   BP Readings from Last 4 Encounters:   01/04/21 122/70   12/28/20 120/68   11/23/20 134/72   07/29/20 (!) 120/56     Wt Readings from Last 6 Encounters:   01/04/21 193 lb 12.8 oz (87.9 kg)   11/23/20 185 lb (83.9 kg)   06/23/20 176 lb (79.8 kg)   06/23/20 176 lb (79.8 kg)   02/19/20 175 lb (79.4 kg)   01/14/20 182 lb (82.6 kg)       Physical examination:  General: awake alert, oriented x3, no abnormal position or movements. HEENT: normocephalic non traumatic, no exophthalmos, no lid lag, no lid retraction   Neck: supple, no LN enlargement, no thyromegaly, no thyroid tenderness, no thyroid bruit, no JVD. Pulm: Clear equal air entry no added sounds, no wheezing or rhonchi    CVS: S1 + S2, no murmur, no heave. Dorsalis pedis pulse palpable   Abd: soft lax, no tenderness, no organomegaly, audible bowel sounds. Skin: warm, no lesions, no rash.  No palmar erythema, no onycholysis, no pretibial Myxoedema, no acropachy   Musculoskeletal: No back tenderness, no kyphosis/scoliosis    Neuro: CN intact, sensation notmal , muscle power normal. No tremors   Psych: normal mood, and affect    Review of Laboratory Data:  I have reviewed the following:  Lab Results   Component Value Date/Time    WBC 9.1 06/23/2020 08:39 AM    RBC 4.85 06/23/2020 08:39 AM    HGB 14.4 06/23/2020 08:39 AM    HCT 44.9 06/23/2020 08:39 AM    MCV 92.6 06/23/2020 08:39 AM    MCH 29.7 06/23/2020 08:39 AM    MCHC 32.1 06/23/2020 08:39 AM    RDW 15.7 (H) 06/23/2020 08:39 AM     06/23/2020 08:39 AM    MPV 10.8 06/23/2020 08:39 AM      Lab Results   Component Value Date/Time     06/23/2020 08:39 AM    K 4.0 06/23/2020 08:39 AM    CO2 23 06/23/2020 08:39 AM    BUN 15 06/23/2020 08:39 AM    CREATININE 0.6 06/23/2020 08:39 AM    CREATININE 0.5 08/20/2018    CALCIUM 9.3 06/23/2020 08:39 AM    LABGLOM >60 06/23/2020 08:39 AM    GFRAA >60 06/23/2020 08:39 AM      Lab Results   Component Value Date/Time    TSH 1.450 09/03/2019 09:41 AM    T4FREE 1.53 11/23/2020 01:05 PM    FT3 4.8 (H) 11/23/2020 01:05 PM    TSI <0.10 12/17/2020 02:07 PM    TPOABS 2.2 11/23/2020 01:05 PM    THGAB <0.9 11/23/2020 01:05 PM     Lab Results   Component Value Date    GLUCOSE 85 06/23/2020     Lab Results   Component Value Date    TRIG 128 06/23/2020    HDL 37 06/23/2020    [address] 154 06/23/2020    CHOL 217 06/23/2020     No results found for: Elliott Osei, a 48 y.o.-old female seen in for management of following issues      Hyperthyroidism  · Likely due to thyroiditis and will likely be transient   · Given her current symptomatology, will check TFT and consider starting MMT if level still elevated   · I discussed the options of medical therapy, radioactive iodine or surgery including the relative risks of hypothyroidism with each therapy, the failure rate and side effects of antithyroid drugs and the risk of exacerbating eye disease with I131. The patient prefers Methimazole   · Reviewed potential side effects of Methimazole    Bone health/vitD deficiency    · Discussed the effect of hyperthyroidism on bone health  · Encourage taking vitD  · Check vitD level     HTN   · Under good control with Atenolol 50 mg daily and Lisinoptil 10 mg daily     I personally reviewed external notes from PCP and other patient's care team providers, and personally interpreted labs associated with the above diagnosis. I also ordered labs to further assess and manage the above addressed medical conditions. Return in about 3 months (around 4/4/2021) for hyperthyroidism . The above issues were reviewed with the patient who understood and agreed with the plan. Thank you for allowing us to participate in the care of this patient. Please do not hesitate to contact us with any additional questions. Diagnosis Orders   1. Hyperthyroidism  TSH without Reflex    T4, Free    T4    T3    Basic Metabolic Panel   2.  Vitamin D deficiency  Vitamin D 25 Hydroxy     Syracuse Plain MD  Endocrinologist, [facility]   [address] [facility] [id number]   Phone: [phone]  Fax: [phone]  -------------------------  An electronic signature was used to authenticate this note.  Albin Gee MD on 1/4/2021 at 1:29 PM

## 2021-01-05 DIAGNOSIS — I10 ESSENTIAL HYPERTENSION: ICD-10-CM

## 2021-01-05 RX ORDER — LISINOPRIL 10 MG/1
10 TABLET ORAL DAILY
Qty: 30 TABLET | Refills: 5 | Status: SHIPPED
Start: 2021-01-05 | End: 2021-07-01 | Stop reason: SDUPTHER

## 2021-01-05 NOTE — TELEPHONE ENCOUNTER
Notify pt,  I have reviewed your recent results    Thyroid hormones are high and this will explained your  palpitations, nervousness, anxiety, irritability, and  difficulty sleeping but will not explained your Wt gain     Will start Methimazole 5 mg daily and repeat labs in mid February     Also, we will need thyroid US in few weeks to evaluate for thyroid nodules

## 2021-01-05 NOTE — TELEPHONE ENCOUNTER
Last Appointment:  12/28/2020  Future Appointments   Date Time Provider Scott Boogieisti   [facility]28[facility]2021  9[facility]   4/13/2021  8:00 AM Dasha Thompson MD [facility]      Refill pending

## 2021-01-06 NOTE — TELEPHONE ENCOUNTER
The pt was notified. Karlee Camerons She will schedule her US and get her labs done at a [facility] facility. She wasn't sure what dose Vitamin D you recommend. She only takes a MVI daily.

## 2021-01-07 NOTE — TELEPHONE ENCOUNTER
I recommend taking Vitamin D 50.000 units one tab once a week for 4 weeks. At the end of 4 weeks, start taking Vitamin D3 2000 units daily over the counter.  Prescription for weekly vitD sent to the pharmacy

## 2021-01-28 ENCOUNTER — OFFICE VISIT (OUTPATIENT)
Dept: PRIMARY CARE CLINIC | Age: 51
End: 2021-01-28
Payer: MEDICARE

## 2021-01-28 VITALS
DIASTOLIC BLOOD PRESSURE: 74 MMHG | TEMPERATURE: 97.2 F | HEART RATE: 87 BPM | BODY MASS INDEX: 40.12 KG/M2 | OXYGEN SATURATION: 98 % | WEIGHT: 199 LBS | HEIGHT: 59 IN | SYSTOLIC BLOOD PRESSURE: 134 MMHG

## 2021-01-28 DIAGNOSIS — E05.90 HYPERTHYROIDISM: ICD-10-CM

## 2021-01-28 DIAGNOSIS — E66.01 MORBIDLY OBESE (HCC): ICD-10-CM

## 2021-01-28 DIAGNOSIS — F32.A DEPRESSION, UNSPECIFIED DEPRESSION TYPE: ICD-10-CM

## 2021-01-28 DIAGNOSIS — E78.2 MIXED HYPERLIPIDEMIA: ICD-10-CM

## 2021-01-28 DIAGNOSIS — I10 ESSENTIAL HYPERTENSION: Primary | ICD-10-CM

## 2021-01-28 DIAGNOSIS — E55.9 VITAMIN D DEFICIENCY: ICD-10-CM

## 2021-01-28 DIAGNOSIS — F41.9 ANXIETY: ICD-10-CM

## 2021-01-28 PROCEDURE — 99213 OFFICE O/P EST LOW 20 MIN: CPT | Performed by: INTERNAL MEDICINE

## 2021-01-28 RX ORDER — CLONAZEPAM 0.5 MG/1
0.5 TABLET ORAL 3 TIMES DAILY PRN
Qty: 60 TABLET | Refills: 3 | Status: SHIPPED
Start: 2021-01-28 | End: 2021-04-28 | Stop reason: SINTOL

## 2021-01-28 RX ORDER — MELATONIN
Qty: 60 TABLET | Refills: 5 | Status: SHIPPED
Start: 2021-01-28 | End: 2021-04-28

## 2021-01-28 ASSESSMENT — ENCOUNTER SYMPTOMS
EYE ITCHING: 0
NAUSEA: 0
SHORTNESS OF BREATH: 0
ABDOMINAL PAIN: 0
RHINORRHEA: 0
EYE DISCHARGE: 0
STRIDOR: 0
SORE THROAT: 0
CONSTIPATION: 0
DIARRHEA: 0
ANAL BLEEDING: 0
TROUBLE SWALLOWING: 0
WHEEZING: 0
COUGH: 0
BLOOD IN STOOL: 0
EYE PAIN: 0
PHOTOPHOBIA: 0
VOMITING: 0
COLOR CHANGE: 0
FACIAL SWELLING: 0

## 2021-01-28 NOTE — ASSESSMENT & PLAN NOTE
She is doing well on clonazepam.  We will give her a refill on this. She shows no sign of drug-seeking or drug diversion. OARRS report reviewed. Last refill was on 12/28/2020. She will need to sign a medication contract when she returns.

## 2021-01-28 NOTE — PROGRESS NOTES
2021    Name: Delfina Reagan : 1970 Sex: female  Age: 46 y.o. Subjective:  Chief Complaint   Patient presents with    1 Month Follow-Up        Other  Associated symptoms include arthralgias, diaphoresis and fatigue. Pertinent negatives include no abdominal pain, chest pain, congestion, coughing, headaches, joint swelling, myalgias, nausea, numbness, rash, sore throat, vomiting or weakness. Patient has been complaining of hot flashes with severe diaphoresis for little over a year. At first we thought it might be medication she was just started on her antihypertensives and diltiazem at the time but her cardiologist did not feel it was that. He felt might be secondary to menopausal state. She then saw her gynecologist Dr. Bel Jorge who did some blood work on her and indeed she is postmenopausal her [address] is 56.8 and her estradiol level is less than 5. All of this is consistent with postmenopausal state. However her TSH was low at 0.137. No previous history of Hashimoto's disease  Gynecologist did not want to start hormone replacement therapy until patient's thyroid was evaluated and treated    Her free T3 was slightly elevated at 4.8 with upper limits of normal being 4.4. Free T4 was normal.  Thyroid peroxidase antibodies were normal, thyroglobulin was less than 0.9. Thyroid-stimulating immunoglobulin was normal at less than 0.1. Thyroid uptake and scan showed nonvisualized thyroid gland. 24-hour radioiodine uptake was 0.7 with a lower limits of normal being 10. Patient also complains of feeling jittery inside. No weight loss. No palpitations but she is on a calcium channel blocker. Her blood pressures have been adequate. She is extremely fatigued and sleepy. She said she could sleep for 24 hours some time. She is always hungry and has gained weight. Her heart rate was rapid so we discontinued her diltiazem and started her on atenolol 50 mg.   She says her heart rate is better .    She was referred to Dr. Ursula Robertson, an endocrinologist.  I reviewed his reports and his recommendations. He has scheduled her for an ultrasound of her thyroid. He started her on methimazole 5 mg a day. He also has some repeat thyroid studies ordered for the near future. And he will be following up after these tests are done. He feels that she has underlying thyroiditis from an unknown cause. Patient has history of hypertension, nonrheumatic mitral valve regurgitation, depression, hyperlipidemia and arthritis. She sees psychiatrist who has her on Fetzima 120 mg daily. I reviewed her report from cardiologist who felt that if she needed a low dose hormone replacement that she could take it. She quit smoking this year. She does not know when her last menstrual period was because she has an IUD. Review of Systems   Constitutional: Positive for diaphoresis and fatigue. Negative for appetite change and unexpected weight change. HENT: Negative for congestion, ear pain, facial swelling, rhinorrhea, sore throat, tinnitus and trouble swallowing. Eyes: Negative for photophobia, pain, discharge, itching and visual disturbance. Respiratory: Negative for cough, shortness of breath, wheezing and stridor. Cardiovascular: Negative for chest pain, palpitations and leg swelling. Gastrointestinal: Negative for abdominal pain, anal bleeding, blood in stool, constipation, diarrhea, nausea and vomiting. Endocrine: Negative for cold intolerance, heat intolerance, polydipsia, polyphagia and polyuria. Genitourinary: Negative for difficulty urinating, dysuria, flank pain, frequency, hematuria and urgency. Hot flashes   Musculoskeletal: Positive for arthralgias. Negative for gait problem, joint swelling and myalgias. Skin: Negative for color change, pallor and rash. Allergic/Immunologic: Negative for environmental allergies and food allergies.    Neurological: Negative for dizziness, tremors, seizures, syncope, speech difficulty, weakness, light-headedness, numbness and headaches. Hematological: Negative for adenopathy. Does not bruise/bleed easily. Psychiatric/Behavioral: Negative for agitation, behavioral problems, confusion, sleep disturbance and suicidal ideas. The patient is nervous/anxious. Current Outpatient Medications:     clonazePAM (KLONOPIN) 0.5 MG tablet, Take 1 tablet by mouth 3 times daily as needed for Anxiety for up to 30 days. , Disp: 60 tablet, Rfl: 3    vitamin D3 (CHOLECALCIFEROL) 25 MCG (1000 UT) TABS tablet, Take 2 a day with food, Disp: 60 tablet, Rfl: 5    lisinopril (PRINIVIL;ZESTRIL) 10 MG tablet, Take 1 tablet by mouth daily, Disp: 30 tablet, Rfl: 5    methIMAzole (TAPAZOLE) 5 MG tablet, Take 1 tablet daily, Disp: 30 tablet, Rfl: 5    meloxicam (MOBIC) 7.5 MG tablet, Take 1 tablet by mouth daily, Disp: 30 tablet, Rfl: 5    atenolol (TENORMIN) 50 MG tablet, Take 1 tablet by mouth daily, Disp: 30 tablet, Rfl: 3    triamterene-hydroCHLOROthiazide (MAXZIDE-25) 37.5-25 MG per tablet, Take 1 tablet by mouth daily, Disp: 30 tablet, Rfl: 5    FETZIMA 120 MG CP24 extended release capsule, 1 capsule daily, Disp: , Rfl: 0    vitamin D (CHOLECALCIFEROL) 20923 UNIT CAPS, take 1 capsule by mouth every week, Disp: , Rfl:      Allergies   Allergen Reactions    Bupropion Hives        Past Medical History:   Diagnosis Date    Essential hypertension 6/3/2019    Hip arthritis     right    Mixed hyperlipidemia 6/3/2019    Non-rheumatic mitral regurgitation 6/3/2019    Pain of right hip joint 1/14/2020       Health Maintenance Due   Topic Date Due    Hepatitis C screen  1970    HIV screen  01/25/1985    DTaP/Tdap/Td vaccine (1 - Tdap) 01/25/1989    Cervical cancer screen  01/25/1991    Diabetes screen  01/25/2010    Colon cancer screen colonoscopy  01/25/2020    Shingles Vaccine (2 of 2) 01/06/2021    Breast cancer screen  02/14/2021        Patient Active Problem List   Diagnosis    Non-rheumatic mitral regurgitation    Mixed hyperlipidemia    Essential hypertension    Depression    Morbidly obese ([address]    Hyperthyroidism    Anxiety    Arthralgia of knee    Vitamin D deficiency        Past Surgical History:   Procedure Laterality Date     SECTION      three times    HIP ARTHROPLASTY Left         Family History   Problem Relation Age of Onset    Heart Disease Father         Social History     Tobacco Use    Smoking status: Former Smoker     Packs/day: 1.00     Years: 30.00     Pack years: 30.00     Start date:     Smokeless tobacco: Never Used   Substance Use Topics    Alcohol use: Not Currently    Drug use: Never        Objective  Vitals:    21 0920   BP: 134/74   Site: Right Upper Arm   Position: Sitting   Cuff Size: Medium Adult   Pulse: 87   Temp: 97.2 °F (36.2 °C)   TempSrc: Temporal   SpO2: 98%   Weight: 199 lb (90.3 kg)   Height: 4' 11\" (1.499 m)        Exam:  Physical Exam  Vitals signs reviewed. Constitutional:       General: She is not in acute distress. Appearance: She is well-developed. She is obese. She is not ill-appearing. HENT:      Head: Normocephalic. Right Ear: External ear normal.      Left Ear: External ear normal.   Eyes:      General: No scleral icterus. Right eye: No discharge. Left eye: No discharge. Extraocular Movements: Extraocular movements intact. Conjunctiva/sclera: Conjunctivae normal.      Pupils: Pupils are equal, round, and reactive to light. Neck:      Musculoskeletal: Normal range of motion and neck supple. Thyroid: No thyromegaly. Cardiovascular:      Rate and Rhythm: Normal rate and regular rhythm. Heart sounds: Normal heart sounds. No murmur. No friction rub. No gallop. Pulmonary:      Effort: Pulmonary effort is normal. No respiratory distress. Breath sounds: Normal breath sounds. No wheezing or rales.    Chest:      Chest wall: No tenderness. Abdominal:      General: Bowel sounds are normal. There is no distension. Palpations: Abdomen is soft. There is no mass. Tenderness: There is no abdominal tenderness. There is no guarding or rebound. Musculoskeletal: Normal range of motion. General: No tenderness or deformity. Lymphadenopathy:      Cervical: No cervical adenopathy. Skin:     General: Skin is warm and dry. Coloration: Skin is not pale. Findings: No erythema or rash. Neurological:      Mental Status: She is alert and oriented to person, place, and time. Cranial Nerves: No cranial nerve deficit. Sensory: No sensory deficit. Deep Tendon Reflexes: Reflexes normal.   Psychiatric:         Behavior: Behavior normal.         Thought Content: Thought content normal.         Judgment: Judgment normal.          Last labs reviewed. ASSESSMENT & PLAN :   Problem List        Circulatory    Essential hypertension - Primary     Continue atenolol and Maxide         Relevant Medications    triamterene-hydroCHLOROthiazide (MAXZIDE-25) 37.5-25 MG per tablet    lisinopril (PRINIVIL;ZESTRIL) 10 MG tablet       Endocrine    Hyperthyroidism     Continue methimazole. She is tolerating this well.   Follow-up with endocrinologist         Relevant Medications    atenolol (TENORMIN) 50 MG tablet    methIMAzole (TAPAZOLE) 5 MG tablet       Other    Mixed hyperlipidemia     Watch saturated fats in diet and will monitor lipids         Relevant Medications    triamterene-hydroCHLOROthiazide (MAXZIDE-25) 37.5-25 MG per tablet    atenolol (TENORMIN) 50 MG tablet    lisinopril (PRINIVIL;ZESTRIL) 10 MG tablet    Depression     Continue Fetzima and follow-up with psychiatrist         Relevant Medications    FETZIMA 120 MG CP24 extended release capsule    Morbidly obese ([address]     Watch diet carefully, portion control and continue with exercise         Anxiety     She is doing well on clonazepam.  We will give her a refill on this. She shows no sign of drug-seeking or drug diversion. [facility] report reviewed. Last refill was on 12/28/2020. She will need to sign a medication contract when she returns. Relevant Medications    FETZIMA 120 MG CP24 extended release capsule    clonazePAM (KLONOPIN) 0.5 MG tablet    Vitamin D deficiency     She was placed on high-dose vitamin D for 4 weeks because of the possibility that methimazole may precipitate bone disease. After 4 weeks of 50,000 IUs weekly she will switch to vitamin D  1000 units 2 pills once a day. Repeat vitamin D levels will be checked by endocrinologist         Relevant Medications    vitamin D3 (CHOLECALCIFEROL) 25 MCG (1000 UT) TABS tablet           Return in about 3 months (around 4/28/2021), or hypertension.        Walt Sanches, DO  1/28/2021

## 2021-01-28 NOTE — ASSESSMENT & PLAN NOTE
She was placed on high-dose vitamin D for 4 weeks because of the possibility that methimazole may precipitate bone disease. After 4 weeks of 50,000 IUs weekly she will switch to vitamin D  1000 units 2 pills once a day.   Repeat vitamin D levels will be checked by endocrinologist

## 2021-02-04 ENCOUNTER — HOSPITAL ENCOUNTER (OUTPATIENT)
Age: 51
Discharge: HOME | End: 2021-02-04
Payer: MEDICARE

## 2021-02-04 ENCOUNTER — HOSPITAL ENCOUNTER (OUTPATIENT)
Dept: ULTRASOUND IMAGING | Age: 51
Discharge: HOME | End: 2021-02-06
Payer: MEDICARE

## 2021-02-04 DIAGNOSIS — E05.90 HYPERTHYROIDISM: ICD-10-CM

## 2021-02-04 LAB
T4 FREE: 0.56 NG/DL (ref 0.93–1.7)
TSH SERPL DL<=0.05 MIU/L-ACNC: 4.98 UIU/ML (ref 0.27–4.2)

## 2021-02-04 PROCEDURE — 84443 ASSAY THYROID STIM HORMONE: CPT

## 2021-02-04 PROCEDURE — 76536 US EXAM OF HEAD AND NECK: CPT

## 2021-02-04 PROCEDURE — 36415 COLL VENOUS BLD VENIPUNCTURE: CPT

## 2021-02-04 PROCEDURE — 84439 ASSAY OF FREE THYROXINE: CPT

## 2021-02-05 ENCOUNTER — TELEPHONE (OUTPATIENT)
Dept: ENDOCRINOLOGY | Age: 51
End: 2021-02-05

## 2021-02-05 DIAGNOSIS — E05.90 HYPERTHYROIDISM: ICD-10-CM

## 2021-02-05 DIAGNOSIS — E55.9 VITAMIN D DEFICIENCY: Primary | ICD-10-CM

## 2021-04-10 ENCOUNTER — HOSPITAL ENCOUNTER (OUTPATIENT)
Age: 51
Discharge: HOME | End: 2021-04-10
Payer: MEDICARE

## 2021-04-10 DIAGNOSIS — E55.9 VITAMIN D DEFICIENCY: ICD-10-CM

## 2021-04-10 DIAGNOSIS — E05.90 HYPERTHYROIDISM: ICD-10-CM

## 2021-04-10 LAB
T4 FREE: 0.6 NG/DL (ref 0.93–1.7)
TSH SERPL DL<=0.05 MIU/L-ACNC: 13.89 UIU/ML (ref 0.27–4.2)
VITAMIN D 25-HYDROXY: 24 NG/ML (ref 30–100)

## 2021-04-10 PROCEDURE — 82306 VITAMIN D 25 HYDROXY: CPT

## 2021-04-10 PROCEDURE — 84439 ASSAY OF FREE THYROXINE: CPT

## 2021-04-10 PROCEDURE — 36415 COLL VENOUS BLD VENIPUNCTURE: CPT

## 2021-04-10 PROCEDURE — 84443 ASSAY THYROID STIM HORMONE: CPT

## 2021-04-12 DIAGNOSIS — E05.90 HYPERTHYROIDISM: ICD-10-CM

## 2021-04-12 RX ORDER — ATENOLOL 50 MG/1
50 TABLET ORAL DAILY
Qty: 90 TABLET | Refills: 1 | Status: SHIPPED
Start: 2021-04-12 | End: 2021-10-07 | Stop reason: SDUPTHER

## 2021-04-12 NOTE — TELEPHONE ENCOUNTER
Last Appointment:  1/28/2021  Future Appointments   Date Time Provider Scott Ashley   4/13/2021  8:15 AM Bon Pierce MD [facility]   4/28/2021  9:30 AM [facility]      Refill pending

## 2021-04-13 ENCOUNTER — VIRTUAL VISIT (OUTPATIENT)
Dept: ENDOCRINOLOGY | Age: 51
End: 2021-04-13
Payer: MEDICARE

## 2021-04-13 DIAGNOSIS — E05.90 HYPERTHYROIDISM: Primary | ICD-10-CM

## 2021-04-13 DIAGNOSIS — E55.9 VITAMIN D DEFICIENCY: ICD-10-CM

## 2021-04-13 PROCEDURE — 99214 OFFICE O/P EST MOD 30 MIN: CPT | Performed by: INTERNAL MEDICINE

## 2021-04-13 NOTE — PROGRESS NOTES
Marco A Persaud was read the following message We want to confirm that, for purposes of billing, this is a virtual visit with your provider for which we will submit a claim for reimbursement with your insurance company. You will be responsible for any copays, coinsurance amounts or other amounts not covered by your insurance company. If you do not accept this, unfortunately we will not be able to schedule or proceed with a virtual visit with the provider. Do you accept? Ana Moranos responded Yes .

## 2021-04-13 NOTE — PROGRESS NOTES
Disease Father        ALLERGIES AND DRUG REACTIONS   Allergies   Allergen Reactions    Bupropion Hives       CURRENT MEDICATIONS   Current Outpatient Medications   Medication Sig Dispense Refill    atenolol (TENORMIN) 50 MG tablet Take 1 tablet by mouth daily 90 tablet 1    vitamin D3 (CHOLECALCIFEROL) 25 MCG (1000 UT) TABS tablet Take 2 a day with food 60 tablet 5    lisinopril (PRINIVIL;ZESTRIL) 10 MG tablet Take 1 tablet by mouth daily 30 tablet 5    methIMAzole (TAPAZOLE) 5 MG tablet Take 1 tablet daily (Patient taking differently: Take 1/2  tablet daily) 30 tablet 5    meloxicam (MOBIC) 7.5 MG tablet Take 1 tablet by mouth daily 30 tablet 5    triamterene-hydroCHLOROthiazide (MAXZIDE-25) 37.5-25 MG per tablet Take 1 tablet by mouth daily 30 tablet 5    FETZIMA 120 MG CP24 extended release capsule 1 capsule daily  0    clonazePAM (KLONOPIN) 0.5 MG tablet Take 1 tablet by mouth 3 times daily as needed for Anxiety for up to 30 days. 60 tablet 3    vitamin D (CHOLECALCIFEROL) 93935 UNIT CAPS take 1 capsule by mouth every week       No current facility-administered medications for this visit. Review of Systems  Constitutional: No fever, no chills, no diaphoresis, no generalized weakness. HEENT: No blurred vision, No sore throat, no ear pain, no hair loss  Neck: denied any neck swelling, difficulty swallowing,   Cadrdiopulomary: No CP, SOB or palpitation, No orthopnea or PND. No cough or wheezing. GI: No N/V/D, no constipation, No abdominal pain, no melena or hematochezia   : Denied any dysuria, hematuria, flank pain, discharge, or incontinence. Skin: denied any rash, ulcer, Hirsute, or hyperpigmentation. MSK: denied any joint deformity, joint pain/swelling, muscle pain, or back pain. Neuro: no numbess, no tingling, no weakness,     OBJECTIVE    There were no vitals taken for this visit.   BP Readings from Last 4 Encounters:   01/28/21 134/74   01/04/21 122/70   12/28/20 120/68   11/23/20 134/72     Wt Readings from Last 6 Encounters:   01/28/21 199 lb (90.3 kg)   01/04/21 193 lb 12.8 oz (87.9 kg)   11/23/20 185 lb (83.9 kg)   06/23/20 176 lb (79.8 kg)   06/23/20 176 lb (79.8 kg)   02/19/20 175 lb (79.4 kg)     Physical examination:  Due to this being a TeleHealth encounter, evaluation of the following organ systems is limited: Vitals/Constitutional/EENT/Resp/CV/GI//MS/Neuro/Skin/Heme-Lymph-Imm. Modified physical exam through Telemedicine camera    General: Communicating well via camera   Neck: no obvious neck mass. No obvious neck deformity     CVS: no distress   Chest: no distress. Chest is moving with respiration    Extremities:  no visible tremor  Skin: No visible rashes as seen from camera   Musculoskeletal: no visible deformity  Neuro: Alert and oriented to person, place, and time. Psychiatric: Normal mood and affect.  Behavior is normal    Review of Laboratory Data:  I have reviewed the following:  Lab Results   Component Value Date/Time    WBC 9.1 06/23/2020 08:39 AM    RBC 4.85 06/23/2020 08:39 AM    HGB 14.4 06/23/2020 08:39 AM    HCT 44.9 06/23/2020 08:39 AM    MCV 92.6 06/23/2020 08:39 AM    MCH 29.7 06/23/2020 08:39 AM    MCHC 32.1 06/23/2020 08:39 AM    RDW 15.7 (H) 06/23/2020 08:39 AM     06/23/2020 08:39 AM    MPV 10.8 06/23/2020 08:39 AM      Lab Results   Component Value Date/Time     01/04/2021 02:22 PM    K 4.9 01/04/2021 02:22 PM    CO2 25 01/04/2021 02:22 PM    BUN 16 01/04/2021 02:22 PM    CREATININE 0.6 01/04/2021 02:22 PM    CREATININE 0.5 08/20/2018    CALCIUM 9.9 01/04/2021 02:22 PM    LABGLOM >60 01/04/2021 02:22 PM    GFRAA >60 01/04/2021 02:22 PM      Lab Results   Component Value Date/Time    TSH 13.890 (H) 04/10/2021 10:05 AM    T4FREE 0.60 (L) 04/10/2021 10:05 AM    A0TPGSY 9.0 01/04/2021 02:22 PM    FT3 4.8 (H) 11/23/2020 01:05 PM    A7ASRWB 116.10 01/04/2021 02:22 PM    TSI <0.10 12/17/2020 02:07 PM    TPOABS 2.2 11/23/2020 01:05 PM    THGAB <0.9 11/23/2020 01:05 PM     Lab Results   Component Value Date    GLUCOSE 101 01/04/2021     Lab Results   Component Value Date    TRIG 128 06/23/2020    HDL 37 06/23/2020    LDLCALC 154 06/23/2020    CHOL 217 06/23/2020     Lab Results   Component Value Date    VITD25 24 04/10/2021    VITD25 24 01/04/2021       ASSESSMENT & Tommie Breaux, a 46 y.o.-old female seen in for management of following issues      Hyperthyroidism  · Likely due to thyroiditis and will likely be transient   · Stop Methimazole and recheck labs in 4 weeks     vitD deficiency    · vitD was low on recent labs   · continue vitd supplement       I personally reviewed external notes from PCP and other patient's care team providers, and personally interpreted labs associated with the above diagnosis. I also ordered labs to further assess and manage the above addressed medical conditions. No follow-ups on file. The above issues were reviewed with the patient who understood and agreed with the plan. Thank you for allowing us to participate in the care of this patient. Please do not hesitate to contact us with any additional questions. Diagnosis Orders   1. Hyperthyroidism  TSH without Reflex    T4, Free    T4   2. Vitamin D deficiency       Kailyn Hurt MD  Endocrinologist, [facility]   [address] [address]   Phone: [phone]  Fax: [phone]  -------------------------  An electronic signature was used to authenticate this note. Kelly Bryant MD on 4/13/2021 at 8:31 AM    This visit was performed as a virtual video visit using a synchronous, two-way, audio-video telehealth technology platform  This Virtual  Visit was conducted, with patient's consent, to reduce the patient's risk of exposure to COVID-19 and provide continuity of care.

## 2021-04-20 DIAGNOSIS — I10 ESSENTIAL HYPERTENSION: ICD-10-CM

## 2021-04-20 RX ORDER — TRIAMTERENE AND HYDROCHLOROTHIAZIDE 37.5; 25 MG/1; MG/1
1 TABLET ORAL DAILY
Qty: 30 TABLET | Refills: 5 | Status: SHIPPED
Start: 2021-04-20 | End: 2021-09-27 | Stop reason: SDUPTHER

## 2021-04-20 NOTE — TELEPHONE ENCOUNTER
Last Appointment:  1/28/2021  Future Appointments   Date Time Provider Scott Ashley   4/28/2021  9:30 AM [address] [facility] DO Lucho Osborn [facility]   8/13/2021  8:30 AM Pete Lang Junior, MD [facility]      Refill pending

## 2021-04-28 ENCOUNTER — OFFICE VISIT (OUTPATIENT)
Dept: PRIMARY CARE CLINIC | Age: 51
End: 2021-04-28
Payer: MEDICARE

## 2021-04-28 VITALS
SYSTOLIC BLOOD PRESSURE: 128 MMHG | TEMPERATURE: 97.6 F | WEIGHT: 203 LBS | BODY MASS INDEX: 40.92 KG/M2 | HEART RATE: 87 BPM | OXYGEN SATURATION: 97 % | DIASTOLIC BLOOD PRESSURE: 78 MMHG | HEIGHT: 59 IN

## 2021-04-28 DIAGNOSIS — E78.2 MIXED HYPERLIPIDEMIA: ICD-10-CM

## 2021-04-28 DIAGNOSIS — F32.89 OTHER DEPRESSION: ICD-10-CM

## 2021-04-28 DIAGNOSIS — F41.9 ANXIETY: ICD-10-CM

## 2021-04-28 DIAGNOSIS — I10 ESSENTIAL HYPERTENSION: Primary | ICD-10-CM

## 2021-04-28 DIAGNOSIS — E05.90 HYPERTHYROIDISM: ICD-10-CM

## 2021-04-28 DIAGNOSIS — E55.9 VITAMIN D DEFICIENCY: ICD-10-CM

## 2021-04-28 DIAGNOSIS — I34.0 NON-RHEUMATIC MITRAL REGURGITATION: ICD-10-CM

## 2021-04-28 PROCEDURE — 99213 OFFICE O/P EST LOW 20 MIN: CPT | Performed by: INTERNAL MEDICINE

## 2021-04-28 RX ORDER — BUSPIRONE HYDROCHLORIDE 10 MG/1
TABLET ORAL
COMMUNITY
Start: 2021-04-14 | End: 2021-08-03

## 2021-04-28 RX ORDER — MELATONIN
2000 DAILY
Qty: 60 TABLET | Refills: 5 | Status: SHIPPED
Start: 2021-04-28 | End: 2022-02-01 | Stop reason: SDUPTHER

## 2021-04-28 ASSESSMENT — ENCOUNTER SYMPTOMS
FACIAL SWELLING: 0
TROUBLE SWALLOWING: 0
RHINORRHEA: 0
SORE THROAT: 0
VOMITING: 0
ANAL BLEEDING: 0
EYE DISCHARGE: 0
NAUSEA: 0
COUGH: 0
WHEEZING: 0
PHOTOPHOBIA: 0
ABDOMINAL PAIN: 0
SHORTNESS OF BREATH: 0
COLOR CHANGE: 0
DIARRHEA: 0
EYE ITCHING: 0
BLOOD IN STOOL: 0
CONSTIPATION: 0
STRIDOR: 0
EYE PAIN: 0

## 2021-04-28 NOTE — PROGRESS NOTES
inside. No weight loss. No palpitations but she is on a calcium channel blocker. Her blood pressures have been adequate. She is extremely fatigued and sleepy. She said she could sleep for 24 hours some time. She is always hungry and has gained weight. Her heart rate was rapid so we discontinued her diltiazem and started her on atenolol 50 mg. She says her heart rate is better . Patient has history of hypertension, nonrheumatic mitral valve regurgitation, depression, hyperlipidemia and arthritis. Vitamin D level was low at 24. Her vitamin D was increased to 2000 units daily. She sees psychiatrist who has her on Fetzima 120 mg daily. She was also started on buspirone because of her increased anxiety. This has helped her    I reviewed her report from cardiologist who felt that if she needed a low dose hormone replacement that she could take it. She quit smoking this year. She does not know when her last menstrual period was because she has an IUD    She had problems with hip pain, this was the hip that was replaced. She saw Dr. Georgi Daniel who felt that nothing could be done at this time, just weight loss and conservative therapy. In March 2021 she fell and injured her left shoulder. She was seen by Dr. Dora Salazar who felt she had a left biceps rupture and rotator cuff dysfunction. She was given a prescription for physical therapy. She took meloxicam  and she feels much better. Review of Systems   Constitutional: Positive for diaphoresis and fatigue. Negative for appetite change and unexpected weight change. HENT: Negative for congestion, ear pain, facial swelling, rhinorrhea, sore throat, tinnitus and trouble swallowing. Eyes: Negative for photophobia, pain, discharge, itching and visual disturbance. Respiratory: Negative for cough, shortness of breath, wheezing and stridor. Cardiovascular: Negative for chest pain, palpitations and leg swelling.    Gastrointestinal: Negative for abdominal pain, anal bleeding, blood in stool, constipation, diarrhea, nausea and vomiting. Endocrine: Negative for cold intolerance, heat intolerance, polydipsia, polyphagia and polyuria. Genitourinary: Negative for difficulty urinating, dysuria, flank pain, frequency, hematuria and urgency. Hot flashes   Musculoskeletal: Negative for arthralgias, gait problem, joint swelling and myalgias. Skin: Negative for color change, pallor and rash. Allergic/Immunologic: Negative for environmental allergies and food allergies. Neurological: Negative for dizziness, tremors, seizures, syncope, speech difficulty, weakness, light-headedness, numbness and headaches. Hematological: Negative for adenopathy. Does not bruise/bleed easily. Psychiatric/Behavioral: Negative for agitation, behavioral problems, confusion, sleep disturbance and suicidal ideas. The patient is nervous/anxious.            Current Outpatient Medications:     vitamin D3 (CHOLECALCIFEROL) 25 MCG (1000 UT) TABS tablet, Take 2 tablets by mouth daily, Disp: 60 tablet, Rfl: 5    triamterene-hydroCHLOROthiazide (MAXZIDE-25) 37.5-25 MG per tablet, Take 1 tablet by mouth daily, Disp: 30 tablet, Rfl: 5    atenolol (TENORMIN) 50 MG tablet, Take 1 tablet by mouth daily, Disp: 90 tablet, Rfl: 1    lisinopril (PRINIVIL;ZESTRIL) 10 MG tablet, Take 1 tablet by mouth daily, Disp: 30 tablet, Rfl: 5    meloxicam (MOBIC) 7.5 MG tablet, Take 1 tablet by mouth daily, Disp: 30 tablet, Rfl: 5    FETZIMA 120 MG CP24 extended release capsule, 1 capsule daily, Disp: , Rfl: 0    busPIRone (BUSPAR) 10 MG tablet, take 1 tablet by mouth three times a day, Disp: , Rfl:      Allergies   Allergen Reactions    Bupropion Hives        Past Medical History:   Diagnosis Date    Essential hypertension 6/3/2019    Hip arthritis     right    Mixed hyperlipidemia 6/3/2019    Non-rheumatic mitral regurgitation 6/3/2019    Pain of right hip joint 1/14/2020       Health Maintenance Due   Topic Date Due    Hepatitis C screen  Never done    HIV screen  Never done    COVID-19 Vaccine (1) Never done    DTaP/Tdap/Td vaccine (1 - Tdap) Never done    Cervical cancer screen  Never done    Diabetes screen  Never done    Colon cancer screen colonoscopy  Never done    Shingles Vaccine (2 of 2) 2021    Breast cancer screen  2021        Patient Active Problem List   Diagnosis    Non-rheumatic mitral regurgitation    Mixed hyperlipidemia    Essential hypertension    Depression    Morbidly obese ([address]    Hyperthyroidism    Anxiety    Arthralgia of knee    Vitamin D deficiency        Past Surgical History:   Procedure Laterality Date     SECTION      three times    HIP ARTHROPLASTY Left         Family History   Problem Relation Age of Onset    Heart Disease Father         Social History     Tobacco Use    Smoking status: Former Smoker     Packs/day: 1.00     Years: 30.00     Pack years: 30.00     Start date:     Smokeless tobacco: Never Used   Substance Use Topics    Alcohol use: Not Currently    Drug use: Never        Objective  Vitals:    21 0933   BP: 128/78   Site: Right Upper Arm   Position: Sitting   Cuff Size: Medium Adult   Pulse: 87   Temp: 97.6 °F (36.4 °C)   TempSrc: Temporal   SpO2: 97%   Weight: 203 lb (92.1 kg)   Height: 4' 11\" (1.499 m)        Exam:  Physical Exam  Vitals signs reviewed. Constitutional:       General: She is not in acute distress. Appearance: She is well-developed. She is obese. She is not ill-appearing. Comments: Diaphoretic   HENT:      Head: Normocephalic. Right Ear: External ear normal.      Left Ear: External ear normal.   Eyes:      General: No scleral icterus. Right eye: No discharge. Left eye: No discharge. Extraocular Movements: Extraocular movements intact. Conjunctiva/sclera: Conjunctivae normal.      Pupils: Pupils are equal, round, and reactive to light. Neck:      Musculoskeletal: Normal range of motion and neck supple. Thyroid: No thyromegaly. Cardiovascular:      Rate and Rhythm: Normal rate and regular rhythm. Heart sounds: Normal heart sounds. No murmur. No friction rub. No gallop. Pulmonary:      Effort: Pulmonary effort is normal. No respiratory distress. Breath sounds: Normal breath sounds. No wheezing or rales. Chest:      Chest wall: No tenderness. Abdominal:      General: Bowel sounds are normal. There is no distension. Palpations: Abdomen is soft. There is no mass. Tenderness: There is no abdominal tenderness. There is no guarding or rebound. Musculoskeletal: Normal range of motion. General: No tenderness or deformity. Lymphadenopathy:      Cervical: No cervical adenopathy. Skin:     General: Skin is warm and dry. Coloration: Skin is not pale. Findings: No erythema or rash. Neurological:      Mental Status: She is alert and oriented to person, place, and time. Cranial Nerves: No cranial nerve deficit. Sensory: No sensory deficit. Deep Tendon Reflexes: Reflexes normal.   Psychiatric:         Mood and Affect: Mood normal.         Behavior: Behavior normal.         Thought Content: Thought content normal.         Judgment: Judgment normal.          Last labs reviewed. ASSESSMENT & PLAN :   Problem List        Circulatory    Non-rheumatic mitral regurgitation       follow-up with cardiologist who does her echocardiograms         Essential hypertension - Primary     Blood pressures are stable. Continue medications and monitor blood pressures at home. Call office if systolics are over [phone] over diastolics over 90. Relevant Medications    lisinopril (PRINIVIL;ZESTRIL) 10 MG tablet    triamterene-hydroCHLOROthiazide (MAXZIDE-25) 37.5-25 MG per tablet    Other Relevant Orders    Comprehensive Metabolic Panel       Endocrine    Hyperthyroidism       treated.   She is now hypothyroid. Off methimazole. Will have blood work done around May 10 for her endocrinologist.  If her thyroid is still underactive at that time she will be started on levothyroxine         Relevant Medications    atenolol (TENORMIN) 50 MG tablet       Other    Mixed hyperlipidemia     Watch saturated fats in diet and will monitor lipids          Relevant Medications    lisinopril (PRINIVIL;ZESTRIL) 10 MG tablet    atenolol (TENORMIN) 50 MG tablet    triamterene-hydroCHLOROthiazide (MAXZIDE-25) 37.5-25 MG per tablet    Other Relevant Orders    Lipid Panel    Depression     Continue Fetzima and follow-up with psychiatrist          Relevant Medications    FETZIMA 120 MG CP24 extended release capsule    busPIRone (BUSPAR) 10 MG tablet    Anxiety       continue buspirone as directed         Relevant Medications    FETZIMA 120 MG CP24 extended release capsule    busPIRone (BUSPAR) 10 MG tablet    Vitamin D deficiency     Continue 2000 units of vitamin D3 a day and will check vitamin D level when she has blood work done for endocrinologist          Relevant Medications    vitamin D3 (CHOLECALCIFEROL) 25 MCG (1000 UT) TABS tablet           Return in about 3 months (around 7/28/2021), or htn, vit d deficiency.        Edgar Rodriguez, DO  4/28/2021

## 2021-04-28 NOTE — ASSESSMENT & PLAN NOTE
Continue 2000 units of vitamin D3 a day and will check vitamin D level when she has blood work done for endocrinologist

## 2021-04-28 NOTE — ASSESSMENT & PLAN NOTE
Blood pressures are stable. Continue medications and monitor blood pressures at home. Call office if systolics are over 538 over diastolics over 90.

## 2021-04-28 NOTE — ASSESSMENT & PLAN NOTE
treated. She is now hypothyroid. Off methimazole.   Will have blood work done around May 10 for her endocrinologist.  If her thyroid is still underactive at that time she will be started on levothyroxine

## 2021-05-06 ENCOUNTER — OFFICE VISIT (OUTPATIENT)
Dept: FAMILY MEDICINE CLINIC | Age: 51
End: 2021-05-06
Payer: MEDICARE

## 2021-05-06 VITALS
BODY MASS INDEX: 41.81 KG/M2 | OXYGEN SATURATION: 98 % | HEART RATE: 86 BPM | WEIGHT: 207 LBS | DIASTOLIC BLOOD PRESSURE: 60 MMHG | SYSTOLIC BLOOD PRESSURE: 102 MMHG | TEMPERATURE: 96.9 F

## 2021-05-06 DIAGNOSIS — R30.0 DYSURIA: ICD-10-CM

## 2021-05-06 DIAGNOSIS — R30.0 DYSURIA: Primary | ICD-10-CM

## 2021-05-06 LAB
BILIRUBIN, POC: ABNORMAL
BLOOD URINE, POC: ABNORMAL
CLARITY, POC: ABNORMAL
COLOR, POC: ABNORMAL
GLUCOSE URINE, POC: ABNORMAL
KETONES, POC: ABNORMAL
LEUKOCYTE EST, POC: ABNORMAL
NITRITE, POC: ABNORMAL
PH, POC: 7
PROTEIN, POC: ABNORMAL
SPECIFIC GRAVITY, POC: 1.02
UROBILINOGEN, POC: 0.2

## 2021-05-06 PROCEDURE — 99213 OFFICE O/P EST LOW 20 MIN: CPT | Performed by: PHYSICIAN ASSISTANT

## 2021-05-06 PROCEDURE — 81002 URINALYSIS NONAUTO W/O SCOPE: CPT | Performed by: PHYSICIAN ASSISTANT

## 2021-05-06 RX ORDER — NITROFURANTOIN 25; 75 MG/1; MG/1
100 CAPSULE ORAL 2 TIMES DAILY
Qty: 14 CAPSULE | Refills: 0 | Status: SHIPPED | OUTPATIENT
Start: 2021-05-06 | End: 2021-08-03 | Stop reason: ALTCHOICE

## 2021-05-06 NOTE — PROGRESS NOTES
2021   [address] [place] [address] PRIMARY CARE  [address] [place] [id number]     Dhiraj Courser  : 1970  Age: 46 y.o. Sex: female       Subjective:  Chief Complaint   Patient presents with    Dysuria       HPI: The patient states that they have had dysuria and urinary frequency for the last 2 days. patient does not feel like she is emptying her bladder. Denies back or flank pain. The patient denies suprapubic pressure. The patient has had urgency and but denies gross hematuria. The patient denies any abdominal or flank pain. The patient denies nausea and vomiting. No fevers or chills. No prior history of kidney stones. The patient has a history of UTI's and states that this feels the same. They come to the urgent care for evaluation. ROS:  Positive and pertinent negatives as per HPI. All other systems are reviewed and negative.        Current Outpatient Medications:     nitrofurantoin, macrocrystal-monohydrate, (MACROBID) 100 MG capsule, Take 1 capsule by mouth 2 times daily, Disp: 14 capsule, Rfl: 0    busPIRone (BUSPAR) 10 MG tablet, take 1 tablet by mouth three times a day, Disp: , Rfl:     vitamin D3 (CHOLECALCIFEROL) 25 MCG (1000 UT) TABS tablet, Take 2 tablets by mouth daily, Disp: 60 tablet, Rfl: 5    triamterene-hydroCHLOROthiazide (MAXZIDE-25) 37.5-25 MG per tablet, Take 1 tablet by mouth daily, Disp: 30 tablet, Rfl: 5    atenolol (TENORMIN) 50 MG tablet, Take 1 tablet by mouth daily, Disp: 90 tablet, Rfl: 1    lisinopril (PRINIVIL;ZESTRIL) 10 MG tablet, Take 1 tablet by mouth daily, Disp: 30 tablet, Rfl: 5    meloxicam (MOBIC) 7.5 MG tablet, Take 1 tablet by mouth daily, Disp: 30 tablet, Rfl: 5    FETZIMA 120 MG CP24 extended release capsule, 1 capsule daily, Disp: , Rfl: 0   Allergies   Allergen Reactions    Bupropion Hives        Objective:  Vitals:    21 0808   BP: 102/60   Pulse: 86   Temp: 96.9 °F (36.1 °C)   TempSrc: Temporal SpO2: 98%   Weight: 207 lb (93.9 kg)        Exam:  Const: Appears healthy and well developed. No signs of acute distress present. Vitals reviewed per triage. Head/Face: Normocephalic, atraumatic. Facies is symmetric. ENMT: Buccal mucosa is moist.  Neck: Trachea midline. Resp: Lungs are clear bilaterally. CV: Rhythm is regular. S1 is normal. S2 is normal. Extremities:Pulses are equal bilaterally  Abdomen: Abdomen soft, nontender to palpation. No masses or organomegaly. No rebound or guarding. No CVA tenderness bilaterally. Musculo: Patient moves extremities without pain or limitation. Skin: Skin is warm and dry. Neuro: Alert and oriented x3. Speech is articulate and fluent. Psych: Patient's mood and affect is appropriate     Discussed with patient the use of probiotics to assist with adverse GI effects including C-diff. Assessment and Plan:  Desmond Ortega was seen today for dysuria. Diagnoses and all orders for this visit:    Dysuria  -     POCT Urinalysis no Micro  -     Culture, Urine; Future    Other orders  -     nitrofurantoin, macrocrystal-monohydrate, (MACROBID) 100 MG capsule; Take 1 capsule by mouth 2 times daily      otc azo ER warning signs given  Return for [place] up with PCP in 7-10 days for re-evaluation. .      Seen By:    JERICA Landry

## 2021-05-09 LAB
ORGANISM: ABNORMAL
URINE CULTURE, ROUTINE: ABNORMAL

## 2021-05-11 ENCOUNTER — HOSPITAL ENCOUNTER (OUTPATIENT)
Age: 51
Discharge: HOME | End: 2021-05-11
Payer: MEDICARE

## 2021-05-11 DIAGNOSIS — E05.90 HYPERTHYROIDISM: ICD-10-CM

## 2021-05-11 DIAGNOSIS — E78.2 MIXED HYPERLIPIDEMIA: ICD-10-CM

## 2021-05-11 DIAGNOSIS — I10 ESSENTIAL HYPERTENSION: ICD-10-CM

## 2021-05-11 LAB
ALBUMIN SERPL-MCNC: 4.7 G/DL (ref 3.5–5.2)
ALP BLD-CCNC: 89 U/L (ref 35–104)
ALT SERPL-CCNC: 46 U/L (ref 0–32)
ANION GAP SERPL CALCULATED.3IONS-SCNC: 10 MMOL/L (ref 7–16)
AST SERPL-CCNC: 45 U/L (ref 0–31)
BILIRUB SERPL-MCNC: 0.5 MG/DL (ref 0–1.2)
BUN BLDV-MCNC: 17 MG/DL (ref 6–20)
CALCIUM SERPL-MCNC: 9.6 MG/DL (ref 8.6–10.2)
CHLORIDE BLD-SCNC: 101 MMOL/L (ref 98–107)
CHOLESTEROL, TOTAL: 218 MG/DL (ref 0–199)
CO2: 27 MMOL/L (ref 22–29)
CREAT SERPL-MCNC: 0.6 MG/DL (ref 0.5–1)
GFR AFRICAN AMERICAN: >60
GFR NON-AFRICAN AMERICAN: >60 ML/MIN/1.73
GLUCOSE BLD-MCNC: 100 MG/DL (ref 74–99)
HDLC SERPL-MCNC: 43 MG/DL
LDL CHOLESTEROL CALCULATED: 139 MG/DL (ref 0–99)
POTASSIUM SERPL-SCNC: 4.8 MMOL/L (ref 3.5–5)
SODIUM BLD-SCNC: 138 MMOL/L (ref 132–146)
T4 FREE: 0.69 NG/DL (ref 0.93–1.7)
T4 TOTAL: 5.1 MCG/DL (ref 4.5–11.7)
TOTAL PROTEIN: 7.5 G/DL (ref 6.4–8.3)
TRIGL SERPL-MCNC: 179 MG/DL (ref 0–149)
TSH SERPL DL<=0.05 MIU/L-ACNC: 5.04 UIU/ML (ref 0.27–4.2)
VLDLC SERPL CALC-MCNC: 36 MG/DL

## 2021-05-11 PROCEDURE — 36415 COLL VENOUS BLD VENIPUNCTURE: CPT

## 2021-05-11 PROCEDURE — 80061 LIPID PANEL: CPT

## 2021-05-11 PROCEDURE — 84439 ASSAY OF FREE THYROXINE: CPT

## 2021-05-11 PROCEDURE — 84443 ASSAY THYROID STIM HORMONE: CPT

## 2021-05-11 PROCEDURE — 80053 COMPREHEN METABOLIC PANEL: CPT

## 2021-05-16 ENCOUNTER — TELEPHONE (OUTPATIENT)
Dept: ENDOCRINOLOGY | Age: 51
End: 2021-05-16

## 2021-05-16 DIAGNOSIS — E03.9 HYPOTHYROIDISM, UNSPECIFIED TYPE: Primary | ICD-10-CM

## 2021-05-16 RX ORDER — LEVOTHYROXINE SODIUM 0.05 MG/1
50 TABLET ORAL DAILY
Qty: 30 TABLET | Refills: 11 | Status: SHIPPED | OUTPATIENT
Start: 2021-05-16 | End: 2022-02-14 | Stop reason: SDUPTHER

## 2021-05-17 NOTE — TELEPHONE ENCOUNTER
Notify pt,  I have reviewed your recent results    Thyroid hormones are low and now you need to start synthroid.  Previous hyperthyroidism likely due to transient thyroiditis which is usually start with hyperthyroid phase and then convert to hypothyroidism    I recommend start small dose of levothyroxine 50 mcg daily and repeat labs in in 6-8 weeks

## 2021-05-21 NOTE — TELEPHONE ENCOUNTER
I called Baldwinrogerio Harris to let her know that she has a perscription for the Levothyroxine at her pharmacy ready for . I gave her the results from her blood work and that we will need to repeat those labs in 6-8 seeks after she starts the levothyroxine. If she has any question she can call our office and gave her our hours.

## 2021-06-09 LAB — MAMMOGRAPHY, EXTERNAL: NORMAL

## 2021-07-01 DIAGNOSIS — I10 ESSENTIAL HYPERTENSION: ICD-10-CM

## 2021-07-01 RX ORDER — LISINOPRIL 10 MG/1
10 TABLET ORAL DAILY
Qty: 30 TABLET | Refills: 5 | Status: SHIPPED
Start: 2021-07-01 | Stop reason: SDUPTHER

## 2021-07-02 ENCOUNTER — HOSPITAL ENCOUNTER (OUTPATIENT)
Age: 51
Discharge: HOME | End: 2021-07-02
Payer: MEDICARE

## 2021-07-02 LAB
T4 FREE: 0.93 NG/DL (ref 0.93–1.7)
TSH SERPL DL<=0.05 MIU/L-ACNC: 2.35 UIU/ML (ref 0.27–4.2)

## 2021-07-02 PROCEDURE — 84439 ASSAY OF FREE THYROXINE: CPT

## 2021-07-02 PROCEDURE — 84443 ASSAY THYROID STIM HORMONE: CPT

## 2021-07-02 PROCEDURE — 36415 COLL VENOUS BLD VENIPUNCTURE: CPT

## 2021-07-05 ENCOUNTER — TELEPHONE (OUTPATIENT)
Dept: ENDOCRINOLOGY | Age: 51
End: 2021-07-05

## 2021-07-06 DIAGNOSIS — I10 ESSENTIAL HYPERTENSION: ICD-10-CM

## 2021-07-06 RX ORDER — LISINOPRIL 10 MG/1
10 TABLET ORAL DAILY
Qty: 30 TABLET | Refills: 5 | Status: SHIPPED
Start: 2021-07-06 | End: 2021-12-09 | Stop reason: SDUPTHER

## 2021-07-06 NOTE — TELEPHONE ENCOUNTER
I left a VM for Zhen Marvin to inform her of her thyroid results are good and not changes to her medication. He will discuss everything at there next appointment.

## 2021-07-06 NOTE — TELEPHONE ENCOUNTER
Notify pt,  I have reviewed your recent results  Great!, thyroid hormones results are within an acceptable range of normal. There is no need for a change in your therapy at this time.  Stay on current thyroid dose until your next OV in few weeks

## 2021-08-03 ENCOUNTER — TELEPHONE (OUTPATIENT)
Dept: PRIMARY CARE CLINIC | Age: 51
End: 2021-08-03

## 2021-08-03 ENCOUNTER — OFFICE VISIT (OUTPATIENT)
Dept: PRIMARY CARE CLINIC | Age: 51
End: 2021-08-03
Payer: MEDICARE

## 2021-08-03 VITALS
DIASTOLIC BLOOD PRESSURE: 82 MMHG | BODY MASS INDEX: 41.12 KG/M2 | WEIGHT: 204 LBS | SYSTOLIC BLOOD PRESSURE: 132 MMHG | OXYGEN SATURATION: 98 % | HEART RATE: 90 BPM | HEIGHT: 59 IN | TEMPERATURE: 97.4 F

## 2021-08-03 DIAGNOSIS — M25.569 ARTHRALGIA OF KNEE, UNSPECIFIED LATERALITY: ICD-10-CM

## 2021-08-03 DIAGNOSIS — I10 ESSENTIAL HYPERTENSION: ICD-10-CM

## 2021-08-03 DIAGNOSIS — R73.9 HYPERGLYCEMIA: ICD-10-CM

## 2021-08-03 DIAGNOSIS — E78.2 MIXED HYPERLIPIDEMIA: ICD-10-CM

## 2021-08-03 DIAGNOSIS — R63.5 WEIGHT GAIN: ICD-10-CM

## 2021-08-03 DIAGNOSIS — F32.89 OTHER DEPRESSION: ICD-10-CM

## 2021-08-03 DIAGNOSIS — I10 ESSENTIAL HYPERTENSION: Primary | ICD-10-CM

## 2021-08-03 DIAGNOSIS — I34.0 NON-RHEUMATIC MITRAL REGURGITATION: ICD-10-CM

## 2021-08-03 DIAGNOSIS — E03.2 HYPOTHYROIDISM DUE TO MEDICATION: ICD-10-CM

## 2021-08-03 DIAGNOSIS — Z12.11 SCREEN FOR COLON CANCER: ICD-10-CM

## 2021-08-03 PROBLEM — E05.90 HYPERTHYROIDISM: Status: RESOLVED | Noted: 2020-11-23 | Resolved: 2021-08-03

## 2021-08-03 LAB — HBA1C MFR BLD: 5.6 % (ref 4–5.6)

## 2021-08-03 PROCEDURE — 99214 OFFICE O/P EST MOD 30 MIN: CPT | Performed by: INTERNAL MEDICINE

## 2021-08-03 RX ORDER — HYDROXYZINE PAMOATE 25 MG/1
CAPSULE ORAL
COMMUNITY
Start: 2021-05-12 | End: 2022-03-10 | Stop reason: ALTCHOICE

## 2021-08-03 ASSESSMENT — ENCOUNTER SYMPTOMS
VOMITING: 0
ABDOMINAL PAIN: 0
PHOTOPHOBIA: 0
ANAL BLEEDING: 0
DIARRHEA: 0
COUGH: 0
TROUBLE SWALLOWING: 0
SORE THROAT: 0
CONSTIPATION: 0
SHORTNESS OF BREATH: 0
EYE ITCHING: 0
EYE DISCHARGE: 0
EYE PAIN: 0
COLOR CHANGE: 0
NAUSEA: 0
BLOOD IN STOOL: 0
STRIDOR: 0
WHEEZING: 0
FACIAL SWELLING: 0
RHINORRHEA: 0

## 2021-08-03 NOTE — ASSESSMENT & PLAN NOTE
Cologuard requisition given patient refuses colonoscopy but knows that if her Cologuard results are positive she will need to get the colonoscopy

## 2021-08-03 NOTE — ASSESSMENT & PLAN NOTE
Blood pressures are stable. Continue medications and monitor blood pressures at home.  Call office if systolics are over 756 over diastolics over 90.  check fasting CMP

## 2021-08-03 NOTE — PROGRESS NOTES
8/3/2021    Name: Eitan Solomon : 1970 Sex: female  Age: 46 y.o. Subjective:  Chief Complaint   Patient presents with    Hypertension        Hypertension  Pertinent negatives include no chest pain, headaches, palpitations or shortness of breath. Other  Associated symptoms include diaphoresis and fatigue. Pertinent negatives include no abdominal pain, arthralgias, chest pain, congestion, coughing, headaches, joint swelling, myalgias, nausea, numbness, rash, sore throat, vomiting or weakness. Patient has been complaining of hot flashes with severe diaphoresis for little over a year. At first we thought it might be medication she was just started on her antihypertensives and diltiazem at the time but her cardiologist did not feel it was that. He felt might be secondary to menopausal state. She then saw her gynecologist Dr. Lisbet Dempsey who did some blood work on her and indeed she is postmenopausal her [address] is 56.8 and her estradiol level is less than 5. All of this is consistent with postmenopausal state. However her TSH was low at 0.137. No previous history of Hashimoto's disease  Gynecologist did not want to start hormone replacement therapy until patient's thyroid was evaluated and treated    She was diagnosed as being hyperthyroid. She was started by Dr. Jann Aguilar on methimazole 5 mg a day. Her last TSH was elevated at about 13 so methimazole was discontinued. He was started on levothyroxine 50 mcg a day and her last TSH and free T4 were within normal limits although the free T4 was at the lower limits of normal.  She wants to know if this could be checked and I told her to have Dr. Foreign Mcgill address this when she sees him on 2021. .  . Patient also complains of feeling jittery inside. No weight loss. No palpitations but she is on a calcium channel blocker. Her blood pressures have been adequate. She is extremely fatigued and sleepy.   She said she could sleep for 24 hours some time.  She is always hungry and has gained weight. Her heart rate was rapid so we discontinued her diltiazem and started her on atenolol 50 mg. She says her heart rate is better . She is quite frustrated over her inability to lose weight. She exercises 6 to 7 days a week and watches her diet very carefully but despite this has not lost any weight. She is asking about help with losing weight. I recommend that she first see nutritionist to make sure that she is in a good diet and if all her blood work is normal and her thyroid is normal we could consider starting her on injectable semaglutide to help with weight loss. She had her Pap test and mammogram done by gynecologist at [address] [facility] [address] for women in [place]. We will get results. ALT and AST were slightly elevated in May 2021. Her fasting blood sugar was borderline at 100. We will recheck. Patient has history of hypertension, nonrheumatic mitral valve regurgitation, depression, hyperlipidemia and arthritis. Vitamin D level was low at 24. Her vitamin D was increased to 2000 units daily. .  We will need to recheck vitamin D level on her next office visit. She sees psychiatrist who has her on Fetzima 120 mg daily. She was also started on buspirone because of her increased anxiety. This has helped her    I reviewed her report from cardiologist who felt that if she needed a low dose hormone replacement that she could take it. She quit smoking this year. She does not know when her last menstrual period was because she has an IUD    She had problems with hip pain, this was the hip that was replaced. She saw Dr. Martinez Friends who felt that nothing could be done at this time, just weight loss and conservative therapy. In March 2021 she fell and injured her left shoulder. She was seen by Dr. Leonila Farr who felt she had a left biceps rupture and rotator cuff dysfunction. She was given a prescription for physical therapy.   She took meloxicam  and she feels much better. Review of Systems   Constitutional: Positive for diaphoresis, fatigue and unexpected weight change. Negative for appetite change. HENT: Negative for congestion, ear pain, facial swelling, rhinorrhea, sore throat, tinnitus and trouble swallowing. Eyes: Negative for photophobia, pain, discharge, itching and visual disturbance. Respiratory: Negative for cough, shortness of breath, wheezing and stridor. Cardiovascular: Negative for chest pain, palpitations and leg swelling. Gastrointestinal: Negative for abdominal pain, anal bleeding, blood in stool, constipation, diarrhea, nausea and vomiting. Endocrine: Negative for cold intolerance, heat intolerance, polydipsia, polyphagia and polyuria. Genitourinary: Negative for difficulty urinating, dysuria, flank pain, frequency, hematuria and urgency. Hot flashes   Musculoskeletal: Negative for arthralgias, gait problem, joint swelling and myalgias. Skin: Negative for color change, pallor and rash. Allergic/Immunologic: Negative for environmental allergies and food allergies. Neurological: Negative for dizziness, tremors, seizures, syncope, speech difficulty, weakness, light-headedness, numbness and headaches. Hematological: Negative for adenopathy. Does not bruise/bleed easily. Psychiatric/Behavioral: Negative for agitation, behavioral problems, confusion, sleep disturbance and suicidal ideas. The patient is nervous/anxious.            Current Outpatient Medications:     lisinopril (PRINIVIL;ZESTRIL) 10 MG tablet, Take 1 tablet by mouth daily, Disp: 30 tablet, Rfl: 5    levothyroxine (SYNTHROID) 50 MCG tablet, Take 1 tablet by mouth daily, Disp: 30 tablet, Rfl: 11    vitamin D3 (CHOLECALCIFEROL) 25 MCG (1000 UT) TABS tablet, Take 2 tablets by mouth daily, Disp: 60 tablet, Rfl: 5    triamterene-hydroCHLOROthiazide (MAXZIDE-25) 37.5-25 MG per tablet, Take 1 tablet by mouth daily, Disp: 30 tablet, Rfl: 5    meloxicam (MOBIC) 7.5 MG tablet, Take 1 tablet by mouth daily, Disp: 30 tablet, Rfl: 5    FETZIMA 120 MG CP24 extended release capsule, 1 capsule daily, Disp: , Rfl: 0    hydrOXYzine (VISTARIL) 25 MG capsule, TAKE 1 CAPSULE 2 TIMES DAILY AS NEEDED, Disp: , Rfl:     atenolol (TENORMIN) 50 MG tablet, Take 1 tablet by mouth daily, Disp: 90 tablet, Rfl: 1     Allergies   Allergen Reactions    Bupropion Hives    Eggs Or Egg-Derived Products Nausea Only        Past Medical History:   Diagnosis Date    Essential hypertension 6/3/2019    Hip arthritis     right    Hyperthyroidism 2020    Mixed hyperlipidemia 6/3/2019    Non-rheumatic mitral regurgitation 6/3/2019    Pain of right hip joint 2020       Health Maintenance Due   Topic Date Due    Hepatitis C screen  Never done    HIV screen  Never done    DTaP/Tdap/Td vaccine (1 - Tdap) Never done    Cervical cancer screen  Never done    Colon cancer screen colonoscopy  Never done    Annual Wellness Visit (AWV)  Never done    Shingles Vaccine (2 of 2) 2021        Patient Active Problem List   Diagnosis    Non-rheumatic mitral regurgitation    Mixed hyperlipidemia    Essential hypertension    Depression    Morbid obesity with body mass index (BMI) of 40.0 to 44.9 in adult (HCC)    Anxiety    Arthralgia of knee    Vitamin D deficiency    Hypothyroidism due to medication    Screen for colon cancer    Hyperglycemia    Weight gain        Past Surgical History:   Procedure Laterality Date     SECTION      three times    HIP ARTHROPLASTY Left         Family History   Problem Relation Age of Onset    Heart Disease Father         Social History     Tobacco Use    Smoking status: Former Smoker     Packs/day: 1.00     Years: 30.00     Pack years: 30.00     Start date:     Smokeless tobacco: Never Used   Substance Use Topics    Alcohol use: Not Currently    Drug use: Never        Objective  Vitals:    21 1002   BP: 132/82   Pulse: 90 Temp: 97.4 °F (36.3 °C)   SpO2: 98%   Weight: 204 lb (92.5 kg)   Height: 4' 11\" (1.499 m)        Exam:  Physical Exam  Vitals reviewed. Constitutional:       General: She is not in acute distress. Appearance: She is well-developed. She is obese. She is not ill-appearing. Comments: Diaphoretic   HENT:      Head: Normocephalic. Right Ear: External ear normal.      Left Ear: External ear normal.   Eyes:      General: No scleral icterus. Right eye: No discharge. Left eye: No discharge. Extraocular Movements: Extraocular movements intact. Conjunctiva/sclera: Conjunctivae normal.      Pupils: Pupils are equal, round, and reactive to light. Neck:      Thyroid: No thyromegaly. Cardiovascular:      Rate and Rhythm: Normal rate and regular rhythm. Heart sounds: Normal heart sounds. No murmur heard. No friction rub. No gallop. Pulmonary:      Effort: Pulmonary effort is normal. No respiratory distress. Breath sounds: Normal breath sounds. No wheezing or rales. Chest:      Chest wall: No tenderness. Abdominal:      General: Bowel sounds are normal. There is no distension. Palpations: Abdomen is soft. There is no mass. Tenderness: There is no abdominal tenderness. There is no guarding or rebound. Musculoskeletal:         General: No tenderness or deformity. Normal range of motion. Cervical back: Normal range of motion and neck supple. Lymphadenopathy:      Cervical: No cervical adenopathy. Skin:     General: Skin is warm and dry. Coloration: Skin is not pale. Findings: No erythema or rash. Neurological:      Mental Status: She is alert and oriented to person, place, and time. Cranial Nerves: No cranial nerve deficit. Sensory: No sensory deficit. Deep Tendon Reflexes: Reflexes normal.   Psychiatric:         Mood and Affect: Mood normal.         Behavior: Behavior normal.         Thought Content:  Thought content exercise program and her thyroid is well within normal range.   Consider one of the  injectables to help with weight loss                Return in about 3 months (around 11/3/2021), or htn, hl.       Pati Paul, DO  8/3/2021

## 2021-08-04 LAB
ALBUMIN SERPL-MCNC: 4.5 G/DL (ref 3.5–5.2)
ALP BLD-CCNC: 78 U/L (ref 35–104)
ALT SERPL-CCNC: 42 U/L (ref 0–32)
ANION GAP SERPL CALCULATED.3IONS-SCNC: 14 MMOL/L (ref 7–16)
AST SERPL-CCNC: 46 U/L (ref 0–31)
BILIRUB SERPL-MCNC: 0.3 MG/DL (ref 0–1.2)
BUN BLDV-MCNC: 16 MG/DL (ref 6–20)
CALCIUM SERPL-MCNC: 9.9 MG/DL (ref 8.6–10.2)
CHLORIDE BLD-SCNC: 101 MMOL/L (ref 98–107)
CHOLESTEROL, TOTAL: 214 MG/DL (ref 0–199)
CO2: 24 MMOL/L (ref 22–29)
CREAT SERPL-MCNC: 0.6 MG/DL (ref 0.5–1)
GFR AFRICAN AMERICAN: >60
GFR NON-AFRICAN AMERICAN: >60 ML/MIN/1.73
GLUCOSE BLD-MCNC: 87 MG/DL (ref 74–99)
HDLC SERPL-MCNC: 41 MG/DL
LDL CHOLESTEROL CALCULATED: 148 MG/DL (ref 0–99)
POTASSIUM SERPL-SCNC: 4.9 MMOL/L (ref 3.5–5)
SODIUM BLD-SCNC: 139 MMOL/L (ref 132–146)
TOTAL PROTEIN: 7.4 G/DL (ref 6.4–8.3)
TRIGL SERPL-MCNC: 127 MG/DL (ref 0–149)
VLDLC SERPL CALC-MCNC: 25 MG/DL

## 2021-08-05 DIAGNOSIS — R74.8 ELEVATED LIVER ENZYMES: ICD-10-CM

## 2021-08-09 ENCOUNTER — TELEPHONE (OUTPATIENT)
Dept: FAMILY MEDICINE CLINIC | Age: 51
End: 2021-08-09

## 2021-08-09 NOTE — TELEPHONE ENCOUNTER
[facility] called to say that the ultrasound was addend. She wanted to make sure you were aware and saw the new results.

## 2021-08-10 DIAGNOSIS — K76.0 FATTY LIVER: ICD-10-CM

## 2021-08-10 DIAGNOSIS — K80.21 CALCULUS OF GALLBLADDER WITH BILIARY OBSTRUCTION BUT WITHOUT CHOLECYSTITIS: Primary | ICD-10-CM

## 2021-08-10 DIAGNOSIS — D18.03 HEPATIC HEMANGIOMA: ICD-10-CM

## 2021-08-13 ENCOUNTER — VIRTUAL VISIT (OUTPATIENT)
Dept: ENDOCRINOLOGY | Age: 51
End: 2021-08-13
Payer: MEDICARE

## 2021-08-13 DIAGNOSIS — E03.9 HYPOTHYROIDISM, UNSPECIFIED TYPE: Primary | ICD-10-CM

## 2021-08-13 DIAGNOSIS — E66.01 MORBID OBESITY (HCC): ICD-10-CM

## 2021-08-13 DIAGNOSIS — E55.9 VITAMIN D DEFICIENCY: ICD-10-CM

## 2021-08-13 PROCEDURE — 99214 OFFICE O/P EST MOD 30 MIN: CPT | Performed by: INTERNAL MEDICINE

## 2021-08-13 NOTE — PROGRESS NOTES
[employer] was read the following message We want to confirm that, for purposes of billing, this is a virtual visit with your provider for which we will submit a claim for reimbursement with your insurance company. You will be responsible for any copays, coinsurance amounts or other amounts not covered by your insurance company. If you do not accept this, unfortunately we will not be able to schedule or proceed with a virtual visit with the provider. Do you accept? Zhen Marvin responded Yes .

## 2021-08-13 NOTE — PROGRESS NOTES
[address] [place] [address] Department of Endocrinology Diabetes and Metabolism   [address] [facility] [address]   Phone: [phone]  Fax: [phone]    Date of Service: 2021  Primary Care Physician: Micaela Lowe DO  Provider: Rosa Maria Moy MD    Reason for the visit:  Hyperthyroidism    History of Present Illness: The history is provided by the patient. No  was used. Accuracy of the patient data is excellent. Debby Salinas is a very pleasant 46 y.o. female seen today for evaluation and management of hyperthyroidism     Debby Salinas was diagnosed with hyperthyroidism in 2020   Labs 2020 - free T3 4.8 (H), free T4 1.53, TPO-Ab negative, Tg-Ab negative, TSI <0.1     Thyroid uptake and scan 2020:   1.  Thyroid gland is not visualized during examination.      2.  24 hour radioiodine uptake of 0.7 percent (normal range: 10-30%).  This is abnormally low. Pt was initially treated with Methimazole for few months then switched to Levothyroxine. She started Levothyroxine in 2021   Pt currently on Levothyroxine 50 mcg daily and doing very well on it   Lab Results   Component Value Date/Time    TSH 2.350 2021 10:45 AM    T4FREE 0.93 2021 10:45 AM     PAST MEDICAL HISTORY   Past Medical History:   Diagnosis Date    Essential hypertension 6/3/2019    Hip arthritis     right    Hyperthyroidism 2020    Mixed hyperlipidemia 6/3/2019    Non-rheumatic mitral regurgitation 6/3/2019    Pain of right hip joint 2020       PAST SURGICAL HISTORY   Past Surgical History:   Procedure Laterality Date     SECTION      three times    HIP ARTHROPLASTY Left        SOCIAL HISTORY   Tobacco:   reports that she has quit smoking. She started smoking about 41 years ago. She has a 30.00 pack-year smoking history. She has never used smokeless tobacco.  Alcohol:   reports previous alcohol use.   Drugs:   reports no history of drug use.    FAMILY HISTORY   Family History   Problem Relation Age of Onset    Heart Disease Father        ALLERGIES AND DRUG REACTIONS   Allergies   Allergen Reactions    Bupropion Hives    Eggs Or Egg-Derived Products Nausea Only       CURRENT MEDICATIONS   Current Outpatient Medications   Medication Sig Dispense Refill    hydrOXYzine (VISTARIL) 25 MG capsule TAKE 1 CAPSULE 2 TIMES DAILY AS NEEDED      lisinopril (PRINIVIL;ZESTRIL) 10 MG tablet Take 1 tablet by mouth daily 30 tablet 5    levothyroxine (SYNTHROID) 50 MCG tablet Take 1 tablet by mouth daily 30 tablet 11    vitamin D3 (CHOLECALCIFEROL) 25 MCG (1000 UT) TABS tablet Take 2 tablets by mouth daily 60 tablet 5    triamterene-hydroCHLOROthiazide (MAXZIDE-25) 37.5-25 MG per tablet Take 1 tablet by mouth daily 30 tablet 5    meloxicam (MOBIC) 7.5 MG tablet Take 1 tablet by mouth daily 30 tablet 5    FETZIMA 120 MG CP24 extended release capsule 1 capsule daily  0    atenolol (TENORMIN) 50 MG tablet Take 1 tablet by mouth daily 90 tablet 1     No current facility-administered medications for this visit. Review of Systems  Constitutional: No fever, no chills, no diaphoresis, no generalized weakness. HEENT: No blurred vision, No sore throat, no ear pain, no hair loss  Neck: denied any neck swelling, difficulty swallowing,   Cadrdiopulomary: No CP, SOB or palpitation, No orthopnea or PND. No cough or wheezing. GI: No N/V/D, no constipation, No abdominal pain, no melena or hematochezia   : Denied any dysuria, hematuria, flank pain, discharge, or incontinence. Skin: denied any rash, ulcer, Hirsute, or hyperpigmentation. MSK: denied any joint deformity, joint pain/swelling, muscle pain, or back pain. Neuro: no numbess, no tingling, no weakness,     OBJECTIVE    There were no vitals taken for this visit.   BP Readings from Last 4 Encounters:   08/03/21 132/82   05/06/21 102/60   04/28/21 128/78   01/28/21 134/74     Wt Readings from Last 6 Encounters:   08/03/21 204 lb (92.5 kg)   05/06/21 207 lb (93.9 kg)   04/28/21 203 lb (92.1 kg)   01/28/21 199 lb (90.3 kg)   01/04/21 193 lb 12.8 oz (87.9 kg)   11/23/20 185 lb (83.9 kg)     Physical examination:  Due to this being a TeleHealth encounter, evaluation of the following organ systems is limited: Vitals/Constitutional/EENT/Resp/CV/GI//MS/Neuro/Skin/Heme-Lymph-Imm. Modified physical exam through Telemedicine camera    General: Communicating well via camera   Neck: no obvious neck mass. No obvious neck deformity     CVS: no distress   Chest: no distress. Chest is moving with respiration    Extremities:  no visible tremor  Skin: No visible rashes as seen from camera   Musculoskeletal: no visible deformity  Neuro: Alert and oriented to person, place, and time. Psychiatric: Normal mood and affect.  Behavior is normal    Review of Laboratory Data:  I have reviewed the following:  Lab Results   Component Value Date/Time    WBC 9.1 06/23/2020 08:39 AM    RBC 4.85 06/23/2020 08:39 AM    HGB 14.4 06/23/2020 08:39 AM    HCT 44.9 06/23/2020 08:39 AM    MCV 92.6 06/23/2020 08:39 AM    MCH 29.7 06/23/2020 08:39 AM    MCHC 32.1 06/23/2020 08:39 AM    RDW 15.7 (H) 06/23/2020 08:39 AM     06/23/2020 08:39 AM    MPV 10.8 06/23/2020 08:39 AM      Lab Results   Component Value Date/Time     08/03/2021 10:48 AM    K 4.9 08/03/2021 10:48 AM    CO2 24 08/03/2021 10:48 AM    BUN 16 08/03/2021 10:48 AM    CREATININE 0.6 08/03/2021 10:48 AM    CREATININE 0.5 08/20/2018 12:00 AM    CALCIUM 9.9 08/03/2021 10:48 AM    LABGLOM >60 08/03/2021 10:48 AM    GFRAA >60 08/03/2021 10:48 AM      Lab Results   Component Value Date/Time    TSH 2.350 07/02/2021 10:45 AM    T4FREE 0.93 07/02/2021 10:45 AM    W4SPQYF 5.1 05/11/2021 08:22 AM    FT3 4.8 (H) 11/23/2020 01:05 PM    U3DAXTK 116.10 01/04/2021 02:22 PM    TSI <0.10 12/17/2020 02:07 PM    TPOABS 2.2 11/23/2020 01:05 PM    THGAB <0.9 11/23/2020 01:05 PM     Lab Results   Component Value Date    LABA1C 5.6 08/03/2021    GLUCOSE 87 08/03/2021     Lab Results   Component Value Date    TRIG 127 08/03/2021    HDL 41 08/03/2021    LDLCALC 148 08/03/2021    CHOL 214 08/03/2021     Lab Results   Component Value Date    VITD25 24 04/10/2021    VITD25 24 01/04/2021       ASSESSMENT & RECOMMENDATIONS   Ryan Miller, a 46 y.o.-old female seen in for management of following issues      Hypothyroidism   · Previous hyperthyroidism Likely due to transient thyroiditis    · Currently doing very well on Levothyroxine 50 mcg daily   · Recheck level in few weeks     vitD deficiency    · continue vitd supplement     Obesity  · Will refer to Dr. aTsha Zendejas     I personally reviewed external notes from PCP and other patient's care team providers, and personally interpreted labs associated with the above diagnosis. I also ordered labs to further assess and manage the above addressed medical conditions. Return in about 6 months (around 2/13/2022) for hypothyroidism, VitD deficiency. The above issues were reviewed with the patient who understood and agreed with the plan. Thank you for allowing us to participate in the care of this patient. Please do not hesitate to contact us with any additional questions. Diagnosis Orders   1. Hypothyroidism, unspecified type  T4, Free    T4    TSH without Reflex   2. Morbid obesity [facility])  Trevor Stafford MD, [address] [place] [address], [facility]   3. Vitamin D deficiency       Shorty Suarez MD  Endocrinologist, [facility] Diabetes Care and Endocrinology   [address] [facility] [address]   Phone: [phone]  Fax: [phone]  -------------------------  An electronic signature was used to authenticate this note.  Toño Jose MD on 8/13/2021 at 8:15 AM  This visit was performed as a virtual video visit using a synchronous, two-way, audio-video telehealth technology platform  This Virtual  Visit was conducted, with patient's consent, to reduce the patient's risk of exposure to COVID-19 and provide continuity of care.

## 2021-08-16 ENCOUNTER — HOSPITAL ENCOUNTER (OUTPATIENT)
Age: 51
Discharge: HOME | End: 2021-08-16
Payer: MEDICARE

## 2021-08-16 ENCOUNTER — TELEPHONE (OUTPATIENT)
Dept: SURGERY | Age: 51
End: 2021-08-16

## 2021-08-16 ENCOUNTER — OFFICE VISIT (OUTPATIENT)
Dept: SURGERY | Age: 51
End: 2021-08-16
Payer: MEDICARE

## 2021-08-16 VITALS
WEIGHT: 204.8 LBS | BODY MASS INDEX: 41.29 KG/M2 | TEMPERATURE: 97.2 F | DIASTOLIC BLOOD PRESSURE: 82 MMHG | SYSTOLIC BLOOD PRESSURE: 138 MMHG | HEART RATE: 87 BPM | HEIGHT: 59 IN

## 2021-08-16 DIAGNOSIS — K80.20 SYMPTOMATIC CHOLELITHIASIS: Primary | ICD-10-CM

## 2021-08-16 DIAGNOSIS — E03.9 HYPOTHYROIDISM, UNSPECIFIED TYPE: ICD-10-CM

## 2021-08-16 LAB
T4 FREE: 0.93 NG/DL (ref 0.93–1.7)
T4 TOTAL: 5.9 MCG/DL (ref 4.5–11.7)
TSH SERPL DL<=0.05 MIU/L-ACNC: 2.66 UIU/ML (ref 0.27–4.2)

## 2021-08-16 PROCEDURE — 99204 OFFICE O/P NEW MOD 45 MIN: CPT | Performed by: SURGERY

## 2021-08-16 PROCEDURE — 84439 ASSAY OF FREE THYROXINE: CPT

## 2021-08-16 PROCEDURE — 84443 ASSAY THYROID STIM HORMONE: CPT

## 2021-08-16 PROCEDURE — 36415 COLL VENOUS BLD VENIPUNCTURE: CPT

## 2021-08-17 RX ORDER — SODIUM CHLORIDE 9 MG/ML
INJECTION, SOLUTION INTRAVENOUS CONTINUOUS
Status: CANCELLED | OUTPATIENT
Start: 2021-08-17

## 2021-08-17 RX ORDER — SODIUM CHLORIDE 0.9 % (FLUSH) 0.9 %
10 SYRINGE (ML) INJECTION PRN
Status: CANCELLED | OUTPATIENT
Start: 2021-08-17

## 2021-08-17 RX ORDER — SODIUM CHLORIDE 0.9 % (FLUSH) 0.9 %
10 SYRINGE (ML) INJECTION EVERY 12 HOURS SCHEDULED
Status: CANCELLED | OUTPATIENT
Start: 2021-08-17

## 2021-08-17 RX ORDER — INDOCYANINE GREEN AND WATER 25 MG
5 KIT INJECTION
Status: CANCELLED | OUTPATIENT
Start: 2021-08-17

## 2021-08-17 RX ORDER — SODIUM CHLORIDE 9 MG/ML
25 INJECTION, SOLUTION INTRAVENOUS PRN
Status: CANCELLED | OUTPATIENT
Start: 2021-08-17

## 2021-08-17 ASSESSMENT — ENCOUNTER SYMPTOMS
SORE THROAT: 0
VOMITING: 0
COUGH: 0
PHOTOPHOBIA: 0
WHEEZING: 0
CONSTIPATION: 0
DIARRHEA: 0
NAUSEA: 1
BACK PAIN: 0
ABDOMINAL PAIN: 1
SHORTNESS OF BREATH: 0
EYE REDNESS: 0
BLOOD IN STOOL: 0

## 2021-08-17 NOTE — PROGRESS NOTES
Consult Note    Dear Alvaro Toussaint DO, thank you for referring Jessica Car for evaluation. Reason for Consult: Cholelithiasis    HISTORY OF PRESENT ILLNESS:    The patient is a 46 y.o. female who presents with reports of initially not really feeling very well. She does report intolerance to a lot of different types of foods. She feels somewhat nauseous after eating. She also has a generalized discomfort in the epigastric and right upper quadrant areas. The symptoms have been occurring for several months. She was noted to have a recent ALT ultrasound which did show a large amount of gallstones. Past Medical History:   Diagnosis Date    Essential hypertension 6/3/2019    Hip arthritis     right    Hyperthyroidism 2020    Mixed hyperlipidemia 6/3/2019    Non-rheumatic mitral regurgitation 6/3/2019    Pain of right hip joint 2020       Past Surgical History:   Procedure Laterality Date     SECTION      three times    HIP ARTHROPLASTY Left        Prior to Admission medications    Medication Sig Start Date End Date Taking?  Authorizing Provider   hydrOXYzine (VISTARIL) 25 MG capsule TAKE 1 CAPSULE 2 TIMES DAILY AS NEEDED 21   Historical Provider, MD   lisinopril (PRINIVIL;ZESTRIL) 10 MG tablet Take 1 tablet by mouth daily 21   Chayo Hart DO   lisinopril (PRINIVIL;ZESTRIL) 10 MG tablet Take 1 tablet by mouth daily 21   Chayo Hart DO   levothyroxine (SYNTHROID) 50 MCG tablet Take 1 tablet by mouth daily 21   Sara Negron MD   vitamin D3 (CHOLECALCIFEROL) 25 MCG (1000 UT) TABS tablet Take 2 tablets by mouth daily 21   Chayo Hart DO   triamterene-hydroCHLOROthiazide (MAXZIDE-25) 37.5-25 MG per tablet Take 1 tablet by mouth daily 21   Chayo Hart DO   atenolol (TENORMIN) 50 MG tablet Take 1 tablet by mouth daily 21  Chayo Hart DO   meloxicam (MOBIC) 7.5 MG tablet Take 1 tablet by mouth daily 20 Jade Diaz DO   FETZIMA 120 MG CP24 extended release capsule 1 capsule daily 2/27/19   Historical Provider, MD       Scheduled Meds:  ContinuousInfusions:  PRN Meds:    Allergies   Allergen Reactions    Bupropion Hives    Eggs Or Egg-Derived Products Nausea Only       Social History     Socioeconomic History    Marital status:      Spouse name: Not on file    Number of children: Not on file    Years of education: Not on file    Highest education level: Not on file   Occupational History    Not on file   Tobacco Use    Smoking status: Former Smoker     Packs/day: 1.00     Years: 30.00     Pack years: 30.00     Start date: 1980    Smokeless tobacco: Never Used   Substance and Sexual Activity    Alcohol use: Not Currently    Drug use: Never    Sexual activity: Yes     Partners: Male   Other Topics Concern    Not on file   Social History Narrative    Not on file     Social Determinants of Health     Financial Resource Strain:     Difficulty of Paying Living Expenses:    Food Insecurity:     Worried About Running Out of Food in the Last Year:     [address] in the Last Year:    Transportation Needs:     Lack of Transportation (Medical):      Lack of Transportation (Non-Medical):    Physical Activity:     Days of Exercise per Week:     Minutes of Exercise per Session:    Stress:     Feeling of Stress :    Social Connections:     Frequency of Communication with Friends and Family:     Frequency of Social Gatherings with Friends and Family:     Attends Hindu Services:     Active Member of Clubs or Organizations:     Attends Club or Organization Meetings:     Marital Status:    Intimate Partner Violence:     Fear of Current or Ex-Partner:     Emotionally Abused:     Physically Abused:     Sexually Abused:        Family History   Problem Relation Age of Onset    Heart Disease Father        Review Of Systems:   Review of Systems   Constitutional: Negative for chills and COMPARISON: None. HISTORY: ORDERING SYSTEM PROVIDED HISTORY: Elevated liver enzymes TECHNOLOGIST PROVIDED HISTORY: This procedure can be scheduled via [employer]. Access your [employer] account by visiting [facility]. Reason for exam:->Elevated liver enzymes FINDINGS: LIVER:  The liver demonstrates diffuse fatty infiltration without evidence of intrahepatic biliary ductal dilatation. 3.7 cm x 3.2 cm x 3.3 cm probable hepatic hemangioma posterior right lobe of the liver. BILIARY SYSTEM:  Gallbladder is unremarkable without evidence of pericholecystic fluid, wall thickening or stones. Negative sonographic Sosa's sign. Common bile duct is within normal limits measuring . RIGHT KIDNEY: The right kidney is grossly unremarkable without evidence of hydronephrosis. PANCREAS:  Visualized portions of the pancreas are unremarkable. OTHER: No evidence of right upper quadrant ascites. 3.7 cm probable hepatic hemangioma posterior right lobe of the liver. Diffuse fatty infiltration of the liver. Ultrasound is personally reviewed. Despite the report of no gallstones in the body of the ultrasound report, on the images the gallbladder is filled  with stones. There does appear to be an addendum from radiology that confirms this as well. Assessment/Plan:  3 59-year-old female with symptomatic cholelithiasis. We will plan for cholecystectomy. The risks of the procedure were explained to the patient including but not limited to bleeding, infection, bile leak, bile duct injury, and the need for additional surgical procedures. The patient expresses understanding and consents to proceed with the surgery.         Electronically signed by Brittaney Bacon DO on 8/17/21 at 10:45 AM EDT

## 2021-08-17 NOTE — H&P
HISTORY OF PRESENT ILLNESS:    The patient is a 46 y.o. female who presents with reports of initially not really feeling very well. She does report intolerance to a lot of different types of foods. She feels somewhat nauseous after eating. She also has a generalized discomfort in the epigastric and right upper quadrant areas. The symptoms have been occurring for several months. She was noted to have a recent ALT ultrasound which did show a large amount of gallstones. Past Medical History:   Diagnosis Date    Essential hypertension 6/3/2019    Hip arthritis     right    Hyperthyroidism 2020    Mixed hyperlipidemia 6/3/2019    Non-rheumatic mitral regurgitation 6/3/2019    Pain of right hip joint 2020       Past Surgical History:   Procedure Laterality Date     SECTION      three times    HIP ARTHROPLASTY Left        Prior to Admission medications    Medication Sig Start Date End Date Taking?  Authorizing Provider   hydrOXYzine (VISTARIL) 25 MG capsule TAKE 1 CAPSULE 2 TIMES DAILY AS NEEDED 21   Historical Provider, MD   lisinopril (PRINIVIL;ZESTRIL) 10 MG tablet Take 1 tablet by mouth daily 21   Chayo Hart DO   lisinopril (PRINIVIL;ZESTRIL) 10 MG tablet Take 1 tablet by mouth daily 21   Chayo Hart DO   levothyroxine (SYNTHROID) 50 MCG tablet Take 1 tablet by mouth daily 21   Rani Chopra MD   vitamin D3 (CHOLECALCIFEROL) 25 MCG (1000 UT) TABS tablet Take 2 tablets by mouth daily 21   Chayo Hart DO   triamterene-hydroCHLOROthiazide (MAXZIDE-25) 37.5-25 MG per tablet Take 1 tablet by mouth daily 21   Chayo Hart DO   atenolol (TENORMIN) 50 MG tablet Take 1 tablet by mouth daily 21  Chayo Hart DO   meloxicam (MOBIC) 7.5 MG tablet Take 1 tablet by mouth daily 20   Chayo Hart DO   FETZIMA 120 MG CP24 extended release capsule 1 capsule daily 19   Historical Provider, MD       Scheduled Meds:  ContinuousInfusions:  PRN Meds:    Allergies   Allergen Reactions    Bupropion Hives    Eggs Or Egg-Derived Products Nausea Only       Social History     Socioeconomic History    Marital status:      Spouse name: Not on file    Number of children: Not on file    Years of education: Not on file    Highest education level: Not on file   Occupational History    Not on file   Tobacco Use    Smoking status: Former Smoker     Packs/day: 1.00     Years: 30.00     Pack years: 30.00     Start date: 1980    Smokeless tobacco: Never Used   Substance and Sexual Activity    Alcohol use: Not Currently    Drug use: Never    Sexual activity: Yes     Partners: Male   Other Topics Concern    Not on file   Social History Narrative    Not on file     Social Determinants of Health     Financial Resource Strain:     Difficulty of Paying Living Expenses:    Food Insecurity:     Worried About Running Out of Food in the Last Year:     [address] in the Last Year:    Transportation Needs:     Lack of Transportation (Medical):  Lack of Transportation (Non-Medical):    Physical Activity:     Days of Exercise per Week:     Minutes of Exercise per Session:    Stress:     Feeling of Stress :    Social Connections:     Frequency of Communication with Friends and Family:     Frequency of Social Gatherings with Friends and Family:     Attends Religion Services:     Active Member of Clubs or Organizations:     Attends Club or Organization Meetings:     Marital Status:    Intimate Partner Violence:     Fear of Current or Ex-Partner:     Emotionally Abused:     Physically Abused:     Sexually Abused:        Family History   Problem Relation Age of Onset    Heart Disease Father        Review Of Systems:   Review of Systems   Constitutional: Negative for chills and fever. HENT: Negative for ear pain, nosebleeds, sore throat and tinnitus. Eyes: Negative for photophobia and redness. Respiratory: Negative for cough, shortness of breath and wheezing. Cardiovascular: Negative for chest pain and palpitations. Gastrointestinal: Positive for abdominal pain and nausea. Negative for blood in stool, constipation, diarrhea and vomiting. Endocrine: Negative for polydipsia. Genitourinary: Negative for dysuria, hematuria and urgency. Musculoskeletal: Negative for back pain and neck pain. Skin: Negative for rash. Neurological: Negative for dizziness, tremors and seizures. Hematological: Does not bruise/bleed easily. All other systems reviewed and are negative. Physical Exam:  Vitals:    08/16/21 0914   BP: 138/82   Site: Right Upper Arm   Pulse: 87   Temp: 97.2 °F (36.2 °C)   TempSrc: Temporal   Weight: 204 lb 12.8 oz (92.9 kg)   Height: 4' 11\" (1.499 m)       General: Well nourished, well developed, no acute distress  Eyes:  PERRL   Conjunctiva unremarkable   ENT:  TM's intact bilaterally, no effusion   Nasal:  No mucosal edema     No nasal drainage   Oral:  mucosa moist and pink   Neck:  Supple   No palpable cervical lymphoadenopathy   Thyroid without mass or enlargement  Resp: Lungs CTAB   Equal and adequate air exchange without accessory muscle use   No rales, rhonchi or wheeze  CV: S1S2 RRR   No murmur   Intact distal pulses   No edema  GI: Abdomen Soft, tender palpation right upper quadrant, non distended   Normoactive bowel sounds   No palpable hepatosplenomegaly  MS: Physiologic ROM of all extremities    Intact distal pulses   No clubbing or cyanosis   Skin Warm and dry; no rash or lesion  Neuro: Alert and oriented; normal and intact dtr's   Psych: Euthymic mood, congruent affect      US LIVER    Addendum Date: 8/9/2021    ADDENDUM: Multiple gallstones identified. Result Date: 8/9/2021  EXAMINATION: RIGHT UPPER QUADRANT ULTRASOUND 8/9/2021 11:24 am COMPARISON: None.  HISTORY: ORDERING SYSTEM PROVIDED HISTORY: Elevated liver enzymes TECHNOLOGIST PROVIDED HISTORY: This procedure can be scheduled via [facility]. Access your [facility] account by visiting [email or web address]. Reason for exam:->Elevated liver enzymes FINDINGS: LIVER:  The liver demonstrates diffuse fatty infiltration without evidence of intrahepatic biliary ductal dilatation. 3.7 cm x 3.2 cm x 3.3 cm probable hepatic hemangioma posterior right lobe of the liver. BILIARY SYSTEM:  Gallbladder is unremarkable without evidence of pericholecystic fluid, wall thickening or stones. Negative sonographic Sosa's sign. Common bile duct is within normal limits measuring . RIGHT KIDNEY: The right kidney is grossly unremarkable without evidence of hydronephrosis. PANCREAS:  Visualized portions of the pancreas are unremarkable. OTHER: No evidence of right upper quadrant ascites. 3.7 cm probable hepatic hemangioma posterior right lobe of the liver. Diffuse fatty infiltration of the liver. Ultrasound is personally reviewed. Despite the report of no gallstones in the body of the ultrasound report, on the images the gallbladder is filled  with stones. There does appear to be an addendum from radiology that confirms this as well. Assessment/Plan:  3 17-year-old female with symptomatic cholelithiasis. We will plan for cholecystectomy. The risks of the procedure were explained to the patient including but not limited to bleeding, infection, bile leak, bile duct injury, and the need for additional surgical procedures. The patient expresses understanding and consents to proceed with the surgery.

## 2021-08-17 NOTE — H&P (VIEW-ONLY)
HISTORY OF PRESENT ILLNESS:    The patient is a 46 y.o. female who presents with reports of initially not really feeling very well. She does report intolerance to a lot of different types of foods. She feels somewhat nauseous after eating. She also has a generalized discomfort in the epigastric and right upper quadrant areas. The symptoms have been occurring for several months. She was noted to have a recent ALT ultrasound which did show a large amount of gallstones. Past Medical History:   Diagnosis Date    Essential hypertension 6/3/2019    Hip arthritis     right    Hyperthyroidism 2020    Mixed hyperlipidemia 6/3/2019    Non-rheumatic mitral regurgitation 6/3/2019    Pain of right hip joint 2020       Past Surgical History:   Procedure Laterality Date     SECTION      three times    HIP ARTHROPLASTY Left        Prior to Admission medications    Medication Sig Start Date End Date Taking?  Authorizing Provider   hydrOXYzine (VISTARIL) 25 MG capsule TAKE 1 CAPSULE 2 TIMES DAILY AS NEEDED 21   Historical Provider, MD   lisinopril (PRINIVIL;ZESTRIL) 10 MG tablet Take 1 tablet by mouth daily 21   Chayo Hart DO   lisinopril (PRINIVIL;ZESTRIL) 10 MG tablet Take 1 tablet by mouth daily 21   Chayo Hart DO   levothyroxine (SYNTHROID) 50 MCG tablet Take 1 tablet by mouth daily 21   Gonzales Eid MD   vitamin D3 (CHOLECALCIFEROL) 25 MCG (1000 UT) TABS tablet Take 2 tablets by mouth daily 21   Chayo Hart DO   triamterene-hydroCHLOROthiazide (MAXZIDE-25) 37.5-25 MG per tablet Take 1 tablet by mouth daily 21   Chayo Hart DO   atenolol (TENORMIN) 50 MG tablet Take 1 tablet by mouth daily 21  Chayo Hart DO   meloxicam (MOBIC) 7.5 MG tablet Take 1 tablet by mouth daily 20   Chayo Hart DO   FETZIMA 120 MG CP24 extended release capsule 1 capsule daily 19   Historical Provider, MD       Scheduled Meds:  ContinuousInfusions:  PRN Meds:    Allergies   Allergen Reactions    Bupropion Hives    Eggs Or Egg-Derived Products Nausea Only       Social History     Socioeconomic History    Marital status:      Spouse name: Not on file    Number of children: Not on file    Years of education: Not on file    Highest education level: Not on file   Occupational History    Not on file   Tobacco Use    Smoking status: Former Smoker     Packs/day: 1.00     Years: 30.00     Pack years: 30.00     Start date: 1980    Smokeless tobacco: Never Used   Substance and Sexual Activity    Alcohol use: Not Currently    Drug use: Never    Sexual activity: Yes     Partners: Male   Other Topics Concern    Not on file   Social History Narrative    Not on file     Social Determinants of Health     Financial Resource Strain:     Difficulty of Paying Living Expenses:    Food Insecurity:     Worried About Running Out of Food in the Last Year:     [address] in the Last Year:    Transportation Needs:     Lack of Transportation (Medical):  Lack of Transportation (Non-Medical):    Physical Activity:     Days of Exercise per Week:     Minutes of Exercise per Session:    Stress:     Feeling of Stress :    Social Connections:     Frequency of Communication with Friends and Family:     Frequency of Social Gatherings with Friends and Family:     Attends Buddhist Services:     Active Member of Clubs or Organizations:     Attends Club or Organization Meetings:     Marital Status:    Intimate Partner Violence:     Fear of Current or Ex-Partner:     Emotionally Abused:     Physically Abused:     Sexually Abused:        Family History   Problem Relation Age of Onset    Heart Disease Father        Review Of Systems:   Review of Systems   Constitutional: Negative for chills and fever. HENT: Negative for ear pain, nosebleeds, sore throat and tinnitus. Eyes: Negative for photophobia and redness. Respiratory: Negative for cough, shortness of breath and wheezing. Cardiovascular: Negative for chest pain and palpitations. Gastrointestinal: Positive for abdominal pain and nausea. Negative for blood in stool, constipation, diarrhea and vomiting. Endocrine: Negative for polydipsia. Genitourinary: Negative for dysuria, hematuria and urgency. Musculoskeletal: Negative for back pain and neck pain. Skin: Negative for rash. Neurological: Negative for dizziness, tremors and seizures. Hematological: Does not bruise/bleed easily. All other systems reviewed and are negative. Physical Exam:  Vitals:    08/16/21 0914   BP: 138/82   Site: Right Upper Arm   Pulse: 87   Temp: 97.2 °F (36.2 °C)   TempSrc: Temporal   Weight: 204 lb 12.8 oz (92.9 kg)   Height: 4' 11\" (1.499 m)       General: Well nourished, well developed, no acute distress  Eyes:  PERRL   Conjunctiva unremarkable   ENT:  TM's intact bilaterally, no effusion   Nasal:  No mucosal edema     No nasal drainage   Oral:  mucosa moist and pink   Neck:  Supple   No palpable cervical lymphoadenopathy   Thyroid without mass or enlargement  Resp: Lungs CTAB   Equal and adequate air exchange without accessory muscle use   No rales, rhonchi or wheeze  CV: S1S2 RRR   No murmur   Intact distal pulses   No edema  GI: Abdomen Soft, tender palpation right upper quadrant, non distended   Normoactive bowel sounds   No palpable hepatosplenomegaly  MS: Physiologic ROM of all extremities    Intact distal pulses   No clubbing or cyanosis   Skin Warm and dry; no rash or lesion  Neuro: Alert and oriented; normal and intact dtr's   Psych: Euthymic mood, congruent affect      US LIVER    Addendum Date: 8/9/2021    ADDENDUM: Multiple gallstones identified. Result Date: 8/9/2021  EXAMINATION: RIGHT UPPER QUADRANT ULTRASOUND 8/9/2021 11:24 am COMPARISON: None.  HISTORY: ORDERING SYSTEM PROVIDED HISTORY: Elevated liver enzymes TECHNOLOGIST PROVIDED HISTORY: This procedure can be scheduled via [facility]. Access your [facility] account by visiting [employer]. Reason for exam:->Elevated liver enzymes FINDINGS: LIVER:  The liver demonstrates diffuse fatty infiltration without evidence of intrahepatic biliary ductal dilatation. 3.7 cm x 3.2 cm x 3.3 cm probable hepatic hemangioma posterior right lobe of the liver. BILIARY SYSTEM:  Gallbladder is unremarkable without evidence of pericholecystic fluid, wall thickening or stones. Negative sonographic Sosa's sign. Common bile duct is within normal limits measuring . RIGHT KIDNEY: The right kidney is grossly unremarkable without evidence of hydronephrosis. PANCREAS:  Visualized portions of the pancreas are unremarkable. OTHER: No evidence of right upper quadrant ascites. 3.7 cm probable hepatic hemangioma posterior right lobe of the liver. Diffuse fatty infiltration of the liver. Ultrasound is personally reviewed. Despite the report of no gallstones in the body of the ultrasound report, on the images the gallbladder is filled  with stones. There does appear to be an addendum from radiology that confirms this as well. Assessment/Plan:  3 70-year-old female with symptomatic cholelithiasis. We will plan for cholecystectomy. The risks of the procedure were explained to the patient including but not limited to bleeding, infection, bile leak, bile duct injury, and the need for additional surgical procedures. The patient expresses understanding and consents to proceed with the surgery.

## 2021-08-18 NOTE — PROGRESS NOTES
[facility] PRE-ADMISSION TESTING INSTRUCTIONS    The Preadmission Testing patient is instructed accordingly using the following criteria (check applicable):    ARRIVAL INSTRUCTIONS:  [x] Parking the day of Surgery is located in the Main Entrance lot. Upon entering the door, make an immediate right to the surgery reception desk    [x] Bring photo ID and insurance card    [] Bring in a copy of Living will or Durable Power of  papers. [x] Please be sure to arrange for responsible adult to provide transportation to and from the hospital    [x] Please arrange for responsible adult to be with you for the 24 hour period post procedure due to having anesthesia      GENERAL INSTRUCTIONS:    [x] Nothing by mouth after midnight, including gum, candy, mints or water    [x] You may brush your teeth, but do not swallow any water    [x] Take medications as instructed with 1-2 oz of water    [] Stop herbal supplements and vitamins 5 days prior to procedure    [] Follow preop dosing of blood thinners per physician instructions    [] Take 1/2 dose of evening insulin, but no insulin after midnight    [] No oral diabetic medications after midnight    [] If diabetic and have low blood sugar or feel symptomatic, take 1-2oz apple juice only    [] Bring inhalers day of surgery    [] Bring C-PAP/ Bi-Pap day of surgery    [] Bring urine specimen day of surgery    [x] Shower or bath with soap, lather and rinse well, AM of Surgery, no lotion, powders or creams to surgical site    [] Follow bowel prep as instructed per surgeon    [x] No tobacco products within 24 hours of surgery     [x] No alcohol or illegal drug use within 24 hours of surgery.     [x] Jewelry, body piercing's, eyeglasses, contact lenses and dentures are not permitted into surgery (bring cases)      [x] Please do not wear any nail polish, make up or hair products on the day of surgery    [x] You can expect a call the business day prior to

## 2021-08-18 NOTE — PROGRESS NOTES

## 2021-08-19 ENCOUNTER — TELEPHONE (OUTPATIENT)
Dept: ENDOCRINOLOGY | Age: 51
End: 2021-08-19

## 2021-08-19 NOTE — TELEPHONE ENCOUNTER
I am getting my gallbladder removed this Friday at [facility] by Dr Ace Matta.

## 2021-08-20 ENCOUNTER — ANESTHESIA EVENT (OUTPATIENT)
Dept: OPERATING ROOM | Age: 51
End: 2021-08-20
Payer: MEDICARE

## 2021-08-20 ENCOUNTER — HOSPITAL ENCOUNTER (OUTPATIENT)
Age: 51
Setting detail: OUTPATIENT SURGERY
Discharge: HOME | End: 2021-08-20
Attending: SURGERY | Admitting: SURGERY
Payer: MEDICARE

## 2021-08-20 ENCOUNTER — ANESTHESIA (OUTPATIENT)
Dept: OPERATING ROOM | Age: 51
End: 2021-08-20
Payer: MEDICARE

## 2021-08-20 VITALS
RESPIRATION RATE: 18 BRPM | WEIGHT: 205 LBS | OXYGEN SATURATION: 95 % | HEIGHT: 59 IN | TEMPERATURE: 95.5 F | DIASTOLIC BLOOD PRESSURE: 72 MMHG | HEART RATE: 70 BPM | BODY MASS INDEX: 41.33 KG/M2 | SYSTOLIC BLOOD PRESSURE: 134 MMHG

## 2021-08-20 VITALS
OXYGEN SATURATION: 94 % | DIASTOLIC BLOOD PRESSURE: 78 MMHG | RESPIRATION RATE: 3 BRPM | SYSTOLIC BLOOD PRESSURE: 148 MMHG | TEMPERATURE: 95.9 F

## 2021-08-20 DIAGNOSIS — K80.20 SYMPTOMATIC CHOLELITHIASIS: Primary | ICD-10-CM

## 2021-08-20 LAB
ANION GAP SERPL CALCULATED.3IONS-SCNC: 10 MMOL/L (ref 7–16)
BUN BLDV-MCNC: 21 MG/DL (ref 6–20)
CALCIUM SERPL-MCNC: 9.2 MG/DL (ref 8.6–10.2)
CHLORIDE BLD-SCNC: 102 MMOL/L (ref 98–107)
CO2: 28 MMOL/L (ref 22–29)
CREAT SERPL-MCNC: 0.6 MG/DL (ref 0.5–1)
EKG ATRIAL RATE: 66 BPM
EKG P AXIS: -13 DEGREES
EKG P-R INTERVAL: 162 MS
EKG Q-T INTERVAL: 420 MS
EKG QRS DURATION: 80 MS
EKG QTC CALCULATION (BAZETT): 440 MS
EKG R AXIS: 34 DEGREES
EKG T AXIS: 59 DEGREES
EKG VENTRICULAR RATE: 66 BPM
GFR AFRICAN AMERICAN: >60
GFR NON-AFRICAN AMERICAN: >60 ML/MIN/1.73
GLUCOSE BLD-MCNC: 97 MG/DL (ref 74–99)
POTASSIUM SERPL-SCNC: 4.8 MMOL/L (ref 3.5–5)
SODIUM BLD-SCNC: 140 MMOL/L (ref 132–146)

## 2021-08-20 PROCEDURE — 6360000002 HC RX W HCPCS: Performed by: NURSE ANESTHETIST, CERTIFIED REGISTERED

## 2021-08-20 PROCEDURE — 6360000002 HC RX W HCPCS: Performed by: SURGERY

## 2021-08-20 PROCEDURE — 6370000000 HC RX 637 (ALT 250 FOR IP): Performed by: ANESTHESIOLOGY

## 2021-08-20 PROCEDURE — 7100000001 HC PACU RECOVERY - ADDTL 15 MIN: Performed by: SURGERY

## 2021-08-20 PROCEDURE — 3700000001 HC ADD 15 MINUTES (ANESTHESIA): Performed by: SURGERY

## 2021-08-20 PROCEDURE — 6360000002 HC RX W HCPCS: Performed by: ANESTHESIOLOGY

## 2021-08-20 PROCEDURE — 93005 ELECTROCARDIOGRAM TRACING: CPT | Performed by: SURGERY

## 2021-08-20 PROCEDURE — 47562 LAPAROSCOPIC CHOLECYSTECTOMY: CPT | Performed by: SURGERY

## 2021-08-20 PROCEDURE — 7100000000 HC PACU RECOVERY - FIRST 15 MIN: Performed by: SURGERY

## 2021-08-20 PROCEDURE — 80048 BASIC METABOLIC PNL TOTAL CA: CPT

## 2021-08-20 PROCEDURE — 2580000003 HC RX 258: Performed by: SURGERY

## 2021-08-20 PROCEDURE — 7100000011 HC PHASE II RECOVERY - ADDTL 15 MIN: Performed by: SURGERY

## 2021-08-20 PROCEDURE — 2500000003 HC RX 250 WO HCPCS

## 2021-08-20 PROCEDURE — 2500000003 HC RX 250 WO HCPCS: Performed by: SURGERY

## 2021-08-20 PROCEDURE — 3700000000 HC ANESTHESIA ATTENDED CARE: Performed by: SURGERY

## 2021-08-20 PROCEDURE — 2500000003 HC RX 250 WO HCPCS: Performed by: ANESTHESIOLOGY

## 2021-08-20 PROCEDURE — 2709999900 HC NON-CHARGEABLE SUPPLY: Performed by: SURGERY

## 2021-08-20 PROCEDURE — 3600000009 HC SURGERY ROBOT BASE: Performed by: SURGERY

## 2021-08-20 PROCEDURE — S2900 ROBOTIC SURGICAL SYSTEM: HCPCS | Performed by: SURGERY

## 2021-08-20 PROCEDURE — 7100000010 HC PHASE II RECOVERY - FIRST 15 MIN: Performed by: SURGERY

## 2021-08-20 PROCEDURE — 6360000002 HC RX W HCPCS

## 2021-08-20 PROCEDURE — 3600000019 HC SURGERY ROBOT ADDTL 15MIN: Performed by: SURGERY

## 2021-08-20 PROCEDURE — 36415 COLL VENOUS BLD VENIPUNCTURE: CPT

## 2021-08-20 PROCEDURE — 2500000003 HC RX 250 WO HCPCS: Performed by: NURSE ANESTHETIST, CERTIFIED REGISTERED

## 2021-08-20 PROCEDURE — 88304 TISSUE EXAM BY PATHOLOGIST: CPT

## 2021-08-20 PROCEDURE — 93010 ELECTROCARDIOGRAM REPORT: CPT | Performed by: INTERNAL MEDICINE

## 2021-08-20 RX ORDER — SODIUM CHLORIDE 0.9 % (FLUSH) 0.9 %
10 SYRINGE (ML) INJECTION PRN
Status: DISCONTINUED | OUTPATIENT
Start: 2021-08-20 | End: 2021-08-20 | Stop reason: HOSPADM

## 2021-08-20 RX ORDER — ROCURONIUM BROMIDE 10 MG/ML
INJECTION, SOLUTION INTRAVENOUS PRN
Status: DISCONTINUED | OUTPATIENT
Start: 2021-08-20 | End: 2021-08-20 | Stop reason: SDUPTHER

## 2021-08-20 RX ORDER — ONDANSETRON 2 MG/ML
INJECTION INTRAMUSCULAR; INTRAVENOUS PRN
Status: DISCONTINUED | OUTPATIENT
Start: 2021-08-20 | End: 2021-08-20 | Stop reason: SDUPTHER

## 2021-08-20 RX ORDER — FENTANYL CITRATE 50 UG/ML
INJECTION, SOLUTION INTRAMUSCULAR; INTRAVENOUS PRN
Status: DISCONTINUED | OUTPATIENT
Start: 2021-08-20 | End: 2021-08-20 | Stop reason: SDUPTHER

## 2021-08-20 RX ORDER — BUPIVACAINE HYDROCHLORIDE AND EPINEPHRINE 2.5; 5 MG/ML; UG/ML
INJECTION, SOLUTION EPIDURAL; INFILTRATION; INTRACAUDAL; PERINEURAL PRN
Status: DISCONTINUED | OUTPATIENT
Start: 2021-08-20 | End: 2021-08-20 | Stop reason: ALTCHOICE

## 2021-08-20 RX ORDER — SODIUM CHLORIDE 9 MG/ML
25 INJECTION, SOLUTION INTRAVENOUS PRN
Status: DISCONTINUED | OUTPATIENT
Start: 2021-08-20 | End: 2021-08-20 | Stop reason: HOSPADM

## 2021-08-20 RX ORDER — METOCLOPRAMIDE HYDROCHLORIDE 5 MG/ML
10 INJECTION INTRAMUSCULAR; INTRAVENOUS ONCE
Status: COMPLETED | OUTPATIENT
Start: 2021-08-20 | End: 2021-08-20

## 2021-08-20 RX ORDER — DIPHENHYDRAMINE HYDROCHLORIDE 50 MG/ML
12.5 INJECTION INTRAMUSCULAR; INTRAVENOUS
Status: DISCONTINUED | OUTPATIENT
Start: 2021-08-20 | End: 2021-08-20 | Stop reason: HOSPADM

## 2021-08-20 RX ORDER — KETOROLAC TROMETHAMINE 30 MG/ML
INJECTION, SOLUTION INTRAMUSCULAR; INTRAVENOUS PRN
Status: DISCONTINUED | OUTPATIENT
Start: 2021-08-20 | End: 2021-08-20 | Stop reason: SDUPTHER

## 2021-08-20 RX ORDER — OXYCODONE HYDROCHLORIDE 5 MG/1
5 TABLET ORAL EVERY 6 HOURS PRN
Qty: 12 TABLET | Refills: 0 | Status: SHIPPED | OUTPATIENT
Start: 2021-08-20 | End: 2021-08-23

## 2021-08-20 RX ORDER — FENTANYL CITRATE 50 UG/ML
25 INJECTION, SOLUTION INTRAMUSCULAR; INTRAVENOUS EVERY 5 MIN PRN
Status: DISCONTINUED | OUTPATIENT
Start: 2021-08-20 | End: 2021-08-20 | Stop reason: HOSPADM

## 2021-08-20 RX ORDER — MIDAZOLAM HYDROCHLORIDE 2 MG/2ML
2 INJECTION, SOLUTION INTRAMUSCULAR; INTRAVENOUS ONCE
Status: COMPLETED | OUTPATIENT
Start: 2021-08-20 | End: 2021-08-20

## 2021-08-20 RX ORDER — KETAMINE HYDROCHLORIDE 10 MG/ML
INJECTION, SOLUTION INTRAMUSCULAR; INTRAVENOUS PRN
Status: DISCONTINUED | OUTPATIENT
Start: 2021-08-20 | End: 2021-08-20 | Stop reason: SDUPTHER

## 2021-08-20 RX ORDER — MIDAZOLAM HYDROCHLORIDE 1 MG/ML
INJECTION INTRAMUSCULAR; INTRAVENOUS PRN
Status: DISCONTINUED | OUTPATIENT
Start: 2021-08-20 | End: 2021-08-20 | Stop reason: SDUPTHER

## 2021-08-20 RX ORDER — ACETAMINOPHEN 500 MG
1000 TABLET ORAL ONCE
Status: COMPLETED | OUTPATIENT
Start: 2021-08-20 | End: 2021-08-20

## 2021-08-20 RX ORDER — INDOCYANINE GREEN AND WATER 25 MG
5 KIT INJECTION
Status: DISCONTINUED | OUTPATIENT
Start: 2021-08-20 | End: 2021-08-20 | Stop reason: HOSPADM

## 2021-08-20 RX ORDER — PROCHLORPERAZINE EDISYLATE 5 MG/ML
5 INJECTION INTRAMUSCULAR; INTRAVENOUS
Status: DISCONTINUED | OUTPATIENT
Start: 2021-08-20 | End: 2021-08-20 | Stop reason: HOSPADM

## 2021-08-20 RX ORDER — LIDOCAINE HYDROCHLORIDE 20 MG/ML
INJECTION, SOLUTION EPIDURAL; INFILTRATION; INTRACAUDAL; PERINEURAL PRN
Status: DISCONTINUED | OUTPATIENT
Start: 2021-08-20 | End: 2021-08-20 | Stop reason: SDUPTHER

## 2021-08-20 RX ORDER — SODIUM CHLORIDE 9 MG/ML
INJECTION, SOLUTION INTRAVENOUS CONTINUOUS
Status: DISCONTINUED | OUTPATIENT
Start: 2021-08-20 | End: 2021-08-20 | Stop reason: HOSPADM

## 2021-08-20 RX ORDER — MEPERIDINE HYDROCHLORIDE 25 MG/ML
12.5 INJECTION INTRAMUSCULAR; INTRAVENOUS; SUBCUTANEOUS EVERY 5 MIN PRN
Status: DISCONTINUED | OUTPATIENT
Start: 2021-08-20 | End: 2021-08-20 | Stop reason: HOSPADM

## 2021-08-20 RX ORDER — PROPOFOL 10 MG/ML
INJECTION, EMULSION INTRAVENOUS PRN
Status: DISCONTINUED | OUTPATIENT
Start: 2021-08-20 | End: 2021-08-20 | Stop reason: SDUPTHER

## 2021-08-20 RX ORDER — SODIUM CHLORIDE 0.9 % (FLUSH) 0.9 %
10 SYRINGE (ML) INJECTION EVERY 12 HOURS SCHEDULED
Status: DISCONTINUED | OUTPATIENT
Start: 2021-08-20 | End: 2021-08-20 | Stop reason: HOSPADM

## 2021-08-20 RX ORDER — HYDROCODONE BITARTRATE AND ACETAMINOPHEN 5; 325 MG/1; MG/1
1 TABLET ORAL
Status: COMPLETED | OUTPATIENT
Start: 2021-08-20 | End: 2021-08-20

## 2021-08-20 RX ADMIN — ONDANSETRON 4 MG: 2 INJECTION INTRAMUSCULAR; INTRAVENOUS at 13:20

## 2021-08-20 RX ADMIN — LIDOCAINE HYDROCHLORIDE 100 MG: 20 INJECTION, SOLUTION EPIDURAL; INFILTRATION; INTRACAUDAL; PERINEURAL at 12:07

## 2021-08-20 RX ADMIN — SODIUM CHLORIDE: 9 INJECTION, SOLUTION INTRAVENOUS at 12:36

## 2021-08-20 RX ADMIN — ACETAMINOPHEN 1000 MG: 500 TABLET ORAL at 10:44

## 2021-08-20 RX ADMIN — FENTANYL CITRATE 50 MCG: 50 INJECTION, SOLUTION INTRAMUSCULAR; INTRAVENOUS at 12:45

## 2021-08-20 RX ADMIN — FENTANYL CITRATE 50 MCG: 50 INJECTION, SOLUTION INTRAMUSCULAR; INTRAVENOUS at 13:32

## 2021-08-20 RX ADMIN — KETOROLAC TROMETHAMINE 30 MG: 30 INJECTION, SOLUTION INTRAMUSCULAR; INTRAVENOUS at 13:22

## 2021-08-20 RX ADMIN — FENTANYL CITRATE 100 MCG: 50 INJECTION, SOLUTION INTRAMUSCULAR; INTRAVENOUS at 12:07

## 2021-08-20 RX ADMIN — METOCLOPRAMIDE 10 MG: 5 INJECTION, SOLUTION INTRAMUSCULAR; INTRAVENOUS at 10:45

## 2021-08-20 RX ADMIN — KETAMINE HYDROCHLORIDE 20 MG: 10 INJECTION INTRAMUSCULAR; INTRAVENOUS at 12:07

## 2021-08-20 RX ADMIN — PROPOFOL 150 MG: 10 INJECTION, EMULSION INTRAVENOUS at 12:07

## 2021-08-20 RX ADMIN — Medication 2000 MG: at 11:53

## 2021-08-20 RX ADMIN — FENTANYL CITRATE 50 MCG: 50 INJECTION, SOLUTION INTRAMUSCULAR; INTRAVENOUS at 12:28

## 2021-08-20 RX ADMIN — ROCURONIUM BROMIDE 40 MG: 10 INJECTION, SOLUTION INTRAVENOUS at 12:07

## 2021-08-20 RX ADMIN — SUGAMMADEX 186 MG: 100 INJECTION, SOLUTION INTRAVENOUS at 13:16

## 2021-08-20 RX ADMIN — MIDAZOLAM HYDROCHLORIDE 2 MG: 1 INJECTION, SOLUTION INTRAMUSCULAR; INTRAVENOUS at 10:44

## 2021-08-20 RX ADMIN — INDOCYANINE GREEN AND WATER 5 MG: KIT at 10:32

## 2021-08-20 RX ADMIN — HYDROCODONE BITARTRATE AND ACETAMINOPHEN 1 TABLET: 5; 325 TABLET ORAL at 14:25

## 2021-08-20 RX ADMIN — MIDAZOLAM 2 MG: 1 INJECTION INTRAMUSCULAR; INTRAVENOUS at 11:58

## 2021-08-20 RX ADMIN — SODIUM CHLORIDE: 9 INJECTION, SOLUTION INTRAVENOUS at 11:58

## 2021-08-20 RX ADMIN — FAMOTIDINE 20 MG: 10 INJECTION, SOLUTION INTRAVENOUS at 10:45

## 2021-08-20 ASSESSMENT — PULMONARY FUNCTION TESTS
PIF_VALUE: 2
PIF_VALUE: 23
PIF_VALUE: 23
PIF_VALUE: 28
PIF_VALUE: 29
PIF_VALUE: 27
PIF_VALUE: 0
PIF_VALUE: 19
PIF_VALUE: 29
PIF_VALUE: 5
PIF_VALUE: 1
PIF_VALUE: 30
PIF_VALUE: 29
PIF_VALUE: 19
PIF_VALUE: 25
PIF_VALUE: 25
PIF_VALUE: 1
PIF_VALUE: 26
PIF_VALUE: 25
PIF_VALUE: 10
PIF_VALUE: 29
PIF_VALUE: 29
PIF_VALUE: 0
PIF_VALUE: 19
PIF_VALUE: 29
PIF_VALUE: 1
PIF_VALUE: 29
PIF_VALUE: 29
PIF_VALUE: 0
PIF_VALUE: 16
PIF_VALUE: 24
PIF_VALUE: 26
PIF_VALUE: 28
PIF_VALUE: 12
PIF_VALUE: 27
PIF_VALUE: 25
PIF_VALUE: 7
PIF_VALUE: 29
PIF_VALUE: 28
PIF_VALUE: 3
PIF_VALUE: 19
PIF_VALUE: 25
PIF_VALUE: 25
PIF_VALUE: 2
PIF_VALUE: 24
PIF_VALUE: 19
PIF_VALUE: 2
PIF_VALUE: 18
PIF_VALUE: 6
PIF_VALUE: 24
PIF_VALUE: 1
PIF_VALUE: 19
PIF_VALUE: 19
PIF_VALUE: 29
PIF_VALUE: 19
PIF_VALUE: 27
PIF_VALUE: 1
PIF_VALUE: 29
PIF_VALUE: 20
PIF_VALUE: 0
PIF_VALUE: 23
PIF_VALUE: 29
PIF_VALUE: 4
PIF_VALUE: 19
PIF_VALUE: 19
PIF_VALUE: 4
PIF_VALUE: 25
PIF_VALUE: 25
PIF_VALUE: 29
PIF_VALUE: 1
PIF_VALUE: 25
PIF_VALUE: 30
PIF_VALUE: 29
PIF_VALUE: 23
PIF_VALUE: 27
PIF_VALUE: 19
PIF_VALUE: 23
PIF_VALUE: 24
PIF_VALUE: 5
PIF_VALUE: 25
PIF_VALUE: 29
PIF_VALUE: 30
PIF_VALUE: 14
PIF_VALUE: 29
PIF_VALUE: 19
PIF_VALUE: 25
PIF_VALUE: 24
PIF_VALUE: 2

## 2021-08-20 ASSESSMENT — LIFESTYLE VARIABLES: SMOKING_STATUS: 0

## 2021-08-20 ASSESSMENT — PAIN SCALES - GENERAL
PAINLEVEL_OUTOF10: 0
PAINLEVEL_OUTOF10: 0
PAINLEVEL_OUTOF10: 3
PAINLEVEL_OUTOF10: 0

## 2021-08-20 ASSESSMENT — PAIN DESCRIPTION - PAIN TYPE: TYPE: SURGICAL PAIN

## 2021-08-20 ASSESSMENT — PAIN - FUNCTIONAL ASSESSMENT: PAIN_FUNCTIONAL_ASSESSMENT: 0-10

## 2021-08-20 ASSESSMENT — PAIN DESCRIPTION - LOCATION: LOCATION: ABDOMEN

## 2021-08-20 ASSESSMENT — PAIN DESCRIPTION - DESCRIPTORS: DESCRIPTORS: ACHING;SORE

## 2021-08-20 NOTE — INTERVAL H&P NOTE
Update History & Physical    The patient's History and Physical of August 16, 2021 was reviewed with the patient and I examined the patient. There was no change. The surgical site was confirmed by the patient and me. Plan: The risks, benefits, expected outcome, and alternative to the recommended procedure have been discussed with the patient. Patient understands and wants to proceed with the procedure.      Electronically signed by Tani Dominguez DO on 8/20/2021 at 11:21 AM

## 2021-08-20 NOTE — OP NOTE
Operative Note      Patient: Alisha Saleh  YOB: 1970  MRN: [id number]    Date of Procedure: 8/20/2021    Pre-Op Diagnosis: SYMPTOMATIC CHOLELITHIASIS    Post-Op Diagnosis: Same       Procedure(s):  LAPAROSCOPIC ROBOTIC ASSISTED CHOLECYSTECTOMY    Surgeon(s):  Jerry Odell DO    Assistant:   * No surgical staff found *    Anesthesia: General    Estimated Blood Loss (mL): Minimal    Complications: None    Specimens:   ID Type Source Tests Collected by Time Destination   A : gallbladder and contents Tissue Gallbladder SURGICAL PATHOLOGY Jerry Odell DO 8/20/2021 1249        Implants:  * No implants in log *      Drains: * No LDAs found *    Findings: Significant cholelithiasis    Detailed Description of Procedure:   Procedure from the operating room under general anesthesia. Patient is prepped draped standard sterile fashion. Marcaine half percent is injected at the incision site. A millimeter incision is made in the patient's left upper quadrant Maxwell's point. Varies needle was then used to access the peritoneal cavity which is then insufflated to a pressure of 15 mils mercury CO2 gas. This is then removed and an 8 mm robotic port is then placed through the incision of the abdominal cavity with the laparoscope in situ. 2 additional 8 mm ports were placed across the patient's mid abdomen under direct visualization. The robot is then docked and instruments are introduced. Next the cystic duct was dissected as well as the cystic artery. The critical view was then obtained. Dissection was performed with the [facility]. Once this was completed, the cystic duct was doubly clipped proximally and singly distally and divided followed by the cystic artery in similar fashion. Gallbladder was removed from its attachments to the liver bed using electrocautery.   It was placed in Endobag and brought out through the patient's left upper quadrant incision which did require dilatation due to the amount of stones. Irrigation was performed. Hemostasis was good. Fascial defect in the left upper quadrant was closed with 2-0 Vicryl using the Stratos device. All skin incisions were then closed with 4 Monocryl after the ports were removed and the robot undocked. Dermabond was used for dressing. At the end the procedure all sponge, needle, instrument counts are correct.     Electronically signed by Molinda Nissen, DO on 8/20/2021 at 1:12 PM

## 2021-08-20 NOTE — ANESTHESIA PRE PROCEDURE
Department of Anesthesiology  Preprocedure Note       Name:  Hernán Wayne   Age:  46 y.o.  :  1970                                          MRN:  [id number]         Date:  2021      Surgeon: Georgina Vale):  Molinda Nissen, DO    Procedure: Procedure(s):  LAPAROSCOPIC ROBOTIC ASSISTED CHOLECYSTECTOMY POSSIBLE OPEN POSSIBLE GRAM    Medications prior to admission:   Prior to Admission medications    Medication Sig Start Date End Date Taking? Authorizing Provider   hydrOXYzine (VISTARIL) 25 MG capsule TAKE 1 CAPSULE 2 TIMES DAILY AS NEEDED 21  Yes Historical Provider, MD   lisinopril (PRINIVIL;ZESTRIL) 10 MG tablet Take 1 tablet by mouth daily 21  Yes Jane Hernandez DO   levothyroxine (SYNTHROID) 50 MCG tablet Take 1 tablet by mouth daily 21  Yes Renzo Miller MD   vitamin D3 (CHOLECALCIFEROL) 25 MCG (1000 UT) TABS tablet Take 2 tablets by mouth daily 21  Yes Jane Hernandez DO   triamterene-hydroCHLOROthiazide (ORPEULD-48) 37.5-25 MG per tablet Take 1 tablet by mouth daily 21  Yes Chayo Hart DO   atenolol (TENORMIN) 50 MG tablet Take 1 tablet by mouth daily 21 Yes Chayo Hart DO   meloxicam (MOBIC) 7.5 MG tablet Take 1 tablet by mouth daily 20  Yes Chayo Hart DO   FETZIMA 120 MG CP24 extended release capsule 120 mg daily  19  Yes Historical Provider, MD   lisinopril (PRINIVIL;ZESTRIL) 10 MG tablet Take 1 tablet by mouth daily 21   Chayo Hart DO       Current medications:    No current facility-administered medications for this encounter.      Current Outpatient Medications   Medication Sig Dispense Refill    hydrOXYzine (VISTARIL) 25 MG capsule TAKE 1 CAPSULE 2 TIMES DAILY AS NEEDED      lisinopril (PRINIVIL;ZESTRIL) 10 MG tablet Take 1 tablet by mouth daily 30 tablet 5    levothyroxine (SYNTHROID) 50 MCG tablet Take 1 tablet by mouth daily 30 tablet 11    vitamin D3 (CHOLECALCIFEROL) 25 MCG (1000 UT) TABS tablet Take 2 tablets by mouth daily 60 tablet 5    triamterene-hydroCHLOROthiazide (MAXZIDE-25) 37.5-25 MG per tablet Take 1 tablet by mouth daily 30 tablet 5    atenolol (TENORMIN) 50 MG tablet Take 1 tablet by mouth daily 90 tablet 1    meloxicam (MOBIC) 7.5 MG tablet Take 1 tablet by mouth daily 30 tablet 5    FETZIMA 120 MG CP24 extended release capsule 120 mg daily   0       Allergies:     Allergies   Allergen Reactions    Bupropion Hives    Eggs Or Egg-Derived Products Nausea Only       Problem List:    Patient Active Problem List   Diagnosis Code    Non-rheumatic mitral regurgitation I34.0    Mixed hyperlipidemia E78.2    Essential hypertension I10    Depression F32.9    Morbid obesity with body mass index (BMI) of 40.0 to 44.9 in adult (HCC) E66.01, Z68.41    Anxiety F41.9    Arthralgia of knee M25.569    Vitamin D deficiency E55.9    Hypothyroidism due to medication E03.2    Screen for colon cancer Z12.11    Hyperglycemia R73.9    Weight gain R63.5    Elevated liver enzymes R74.8       Past Medical History:        Diagnosis Date    Essential hypertension 6/3/2019    Gallstones     Hip arthritis     right    Hyperthyroidism 2020    Mixed hyperlipidemia 6/3/2019    Non-rheumatic mitral regurgitation 6/3/2019       Past Surgical History:        Procedure Laterality Date     SECTION      three times    HIP ARTHROPLASTY Left        Social History:    Social History     Tobacco Use    Smoking status: Former Smoker     Packs/day: 1.00     Years: 30.00     Pack years: 30.00     Start date:     Smokeless tobacco: Never Used   Substance Use Topics    Alcohol use: Not Currently                                Counseling given: Not Answered      Vital Signs (Current):   Vitals:    21 1505   Weight: 205 lb (93 kg)   Height: 4' 11\" (1.499 m)                                              BP Readings from Last 3 Encounters:   21 138/82   21 132/82   21 102/60       NPO Status:                                                                                 BMI:   Wt Readings from Last 3 Encounters:   08/16/21 204 lb 12.8 oz (92.9 kg)   08/03/21 204 lb (92.5 kg)   05/06/21 207 lb (93.9 kg)     Body mass index is 41.4 kg/m². CBC:   Lab Results   Component Value Date    WBC 9.1 06/23/2020    RBC 4.85 06/23/2020    HGB 14.4 06/23/2020    HCT 44.9 06/23/2020    MCV 92.6 06/23/2020    RDW 15.7 06/23/2020     06/23/2020       CMP:   Lab Results   Component Value Date     08/03/2021    K 4.9 08/03/2021     08/03/2021    CO2 24 08/03/2021    BUN 16 08/03/2021    CREATININE 0.6 08/03/2021    CREATININE 0.5 08/20/2018    GFRAA >60 08/03/2021    AGRATIO 1.4 05/25/2020    LABGLOM >60 08/03/2021    GLUCOSE 87 08/03/2021    PROT 7.4 08/03/2021    CALCIUM 9.9 08/03/2021    BILITOT 0.3 08/03/2021    ALKPHOS 78 08/03/2021    AST 46 08/03/2021    ALT 42 08/03/2021       POC Tests: No results for input(s): POCGLU, POCNA, POCK, POCCL, POCBUN, POCHEMO, POCHCT in the last 72 hours.     Coags:   Lab Results   Component Value Date    PROTIME 10.7 03/09/2020    INR 0.96 03/09/2020    APTT 29.9 03/09/2020       HCG (If Applicable): No results found for: PREGTESTUR, PREGSERUM, HCG, HCGQUANT     ABGs: No results found for: PHART, PO2ART, EAZ1QPX, DUS6GFU, BEART, A9FEZGUO     Type & Screen (If Applicable):  No results found for: LABABO, LABRH    Drug/Infectious Status (If Applicable):  No results found for: HIV, HEPCAB    COVID-19 Screening (If Applicable): No results found for: COVID19        Anesthesia Evaluation  Patient summary reviewed and Nursing notes reviewed no history of anesthetic complications:   Airway: Mallampati: III  TM distance: <3 FB   Neck ROM: full  Mouth opening: > = 3 FB Dental:          Pulmonary:normal exam  breath sounds clear to auscultation      (-) not a current smoker (Quit - 30 pyhx.)                           Cardiovascular:  Exercise tolerance: good (>4 METS),   (+) hypertension: mild, valvular problems/murmurs: MR, murmur (Grade 1), hyperlipidemia    (-) past MI, CAD, CABG/stent, dysrhythmias,  angina,  CHF, orthopnea, PND and  NORMAN      Rhythm: regular  Rate: normal  Echocardiogram reviewed         Beta Blocker:  Dose within 24 Hrs         Neuro/Psych:   (+) psychiatric history: stable with treatmentdepression/anxiety             GI/Hepatic/Renal:   (+) morbid obesity (BMI 41.5 kg/m2)         ROS comment: Hepatic hemangioma    Fatty liver infiltration    Symptomatic cholelithiasis. Endo/Other:    (+) hypothyroidism, hyperthyroidism: arthritis: OA., . Pt had no PAT visit       Abdominal:   (+) obese,           Vascular: Other Findings: Pt. anxious          Anesthesia Plan      general     ASA 3     (Modified RSI with HOB at 30 degrees RT  Pre-ox. X 3 minutes  Ketamine 20mg IV  PONV prophylaxis)  Induction: intravenous. MIPS: Postoperative opioids intended and Prophylactic antiemetics administered. Anesthetic plan and risks discussed with patient. Plan discussed with CRNA.                   Camila Lopes DO   8/20/2021

## 2021-08-20 NOTE — PROGRESS NOTES
CLINICAL PHARMACY NOTE: MEDS TO BEDS    Total # of Prescriptions Filled: 1   The following medications were delivered to the patient:  · Oxycodone 5 mg    Additional Documentation:

## 2021-08-20 NOTE — PROGRESS NOTES
Spoke with anesthesia about patient's blood pressure. Also explained that she did not take her blood pressure medication at home. Doctor was fine with patient discharging.

## 2021-08-20 NOTE — ANESTHESIA POSTPROCEDURE EVALUATION
Department of Anesthesiology  Postprocedure Note    Patient: Lara Mix  MRN: [id number]  YOB: 1970  Date of evaluation: 8/20/2021  Time:  2:19 PM     Procedure Summary     Date: 08/20/21 Room / Location: [facility] 09 / 106 [facility]    Anesthesia Start: 1153 Anesthesia Stop: 3129    Procedure: LAPAROSCOPIC ROBOTIC ASSISTED CHOLECYSTECTOMY (N/A Abdomen) Diagnosis: (SYMPTOMATIC CHOLELITHIASIS)    Surgeons: Krissy Carcamo DO Responsible Provider: Vikash Parada DO    Anesthesia Type: general ASA Status: 3          Anesthesia Type: general    Jose Phase I: Jose Score: 10    Jose Phase II:      Last vitals: Reviewed and per EMR flowsheets.        Anesthesia Post Evaluation    Patient location during evaluation: bedside  Patient participation: complete - patient participated  Level of consciousness: awake  Pain score: 3  Airway patency: patent  Nausea & Vomiting: no vomiting and no nausea  Complications: no  Cardiovascular status: hemodynamically stable  Respiratory status: acceptable  Hydration status: stable

## 2021-08-22 ENCOUNTER — TELEPHONE (OUTPATIENT)
Dept: ENDOCRINOLOGY | Age: 51
End: 2021-08-22

## 2021-08-23 NOTE — TELEPHONE ENCOUNTER
I already ordered referral to Dr. Rm Johnson for [facility] loss.  See referral order from 8/13    I also spoke to Dr. Rm Johnson to see her shortly after BG surgery

## 2021-08-23 NOTE — TELEPHONE ENCOUNTER
Called pt to inform. Pt just had her gallbladder out on Friday 8/20/21. Pt says that you were going to refer her to a dietitian after removal. Please advise.

## 2021-09-02 ENCOUNTER — OFFICE VISIT (OUTPATIENT)
Dept: BARIATRICS/WEIGHT MGMT | Age: 51
End: 2021-09-02
Payer: MEDICARE

## 2021-09-02 VITALS
HEART RATE: 106 BPM | WEIGHT: 207.6 LBS | SYSTOLIC BLOOD PRESSURE: 120 MMHG | TEMPERATURE: 97.8 F | BODY MASS INDEX: 40.76 KG/M2 | DIASTOLIC BLOOD PRESSURE: 64 MMHG | HEIGHT: 60 IN

## 2021-09-02 DIAGNOSIS — E06.3 HYPOTHYROIDISM DUE TO HASHIMOTO'S THYROIDITIS: Primary | ICD-10-CM

## 2021-09-02 DIAGNOSIS — E03.8 HYPOTHYROIDISM DUE TO HASHIMOTO'S THYROIDITIS: Primary | ICD-10-CM

## 2021-09-02 DIAGNOSIS — E66.01 CLASS 3 SEVERE OBESITY DUE TO EXCESS CALORIES WITHOUT SERIOUS COMORBIDITY WITH BODY MASS INDEX (BMI) OF 40.0 TO 44.9 IN ADULT (HCC): ICD-10-CM

## 2021-09-02 PROBLEM — Z12.11 SCREEN FOR COLON CANCER: Status: RESOLVED | Noted: 2021-08-03 | Resolved: 2021-09-02

## 2021-09-02 PROBLEM — E66.813 CLASS 3 SEVERE OBESITY DUE TO EXCESS CALORIES WITHOUT SERIOUS COMORBIDITY WITH BODY MASS INDEX (BMI) OF 40.0 TO 44.9 IN ADULT: Status: ACTIVE | Noted: 2020-11-23

## 2021-09-02 PROCEDURE — 99202 OFFICE O/P NEW SF 15 MIN: CPT

## 2021-09-02 PROCEDURE — 99205 OFFICE O/P NEW HI 60 MIN: CPT | Performed by: INTERNAL MEDICINE

## 2021-09-02 NOTE — PROGRESS NOTES
CC -   Hypothyroidism, Obesity    BACKGROUND -   First visit: 9/2/21     Obesity   Began in late 20's  Initial BMI 41.2, Wt 207.6 lbs, Ht 4' 11.5\"  HS Grad wt 125 lbs  Lowest   wt 125 lbs   Highest  wt  207 lbs  Pattern of wt gain: grad until rapid the past year  Wt change past yr: +22 lbs  Most wt lost:  12 lbs (exercise)  Other diets attempted: none    Desire to lose weight: 10/10  Prob posed by appetite: 5/10    Initial Diet:    Number of meals per day - 2-3    Number of snacks per day - 1    Meal volume - 12\" plate, sometimes seconds    Fast food/convenience store - 0x/week    Restaurants (not fast food) - 1x/week   Sweets - 0-1d/week   Chips - 0d/week   Crackers/pretzels - 0d/week   Nuts - 0d/week   Peanut Butter - 0d/week   Popcorn - 0-1d/week   Dried fruit - 0d/week   Whole fruit - 0d/week   Breakfast cereal - 2-3d/week (one serving)   [place]/Protein/Energy bar - 0d/week   Sugar sweetened beverages - 16-20 oz reg soda/wk   Protein - No supplements   Fiber - No supplements     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)    Walking - 30 min/d (eliptical), 3d/wk    Running - none    Resistance - 45 min/d, 3d/wk    Aerobic class - none    ______________________    [facility] -  Past Medical History:   Diagnosis Date    Essential hypertension 06/03/2019    Gallstones     Hip arthritis     B/L BRAX[place]    Hyperthyroidism 11/23/2020    Mixed hyperlipidemia 06/03/2019    Non-rheumatic mitral regurgitation 06/03/2019     Current Outpatient Medications   Medication Sig Dispense Refill    hydrOXYzine (VISTARIL) 25 MG capsule TAKE 1 CAPSULE 2 TIMES DAILY AS NEEDED      lisinopril (PRINIVIL;ZESTRIL) 10 MG tablet Take 1 tablet by mouth daily 30 tablet 5    levothyroxine (SYNTHROID) 50 MCG tablet Take 1 tablet by mouth daily 30 tablet 11    vitamin D3 (CHOLECALCIFEROL) 25 MCG (1000 UT) TABS tablet Take 2 tablets by mouth daily 60 tablet 5    triamterene-hydroCHLOROthiazide (MAXZIDE-25) 37.5-25 MG per tablet Take 1 tablet by mouth daily 30 tablet 5    atenolol (TENORMIN) 50 MG tablet Take 1 tablet by mouth daily 90 tablet 1    meloxicam (MOBIC) 7.5 MG tablet Take 1 tablet by mouth daily 30 tablet 5    FETZIMA 120 MG CP24 extended release capsule 120 mg daily   0     No current facility-administered medications for this visit. ROS -  Card - no CP  GI - no N/V/D/C    PE -  Gen : /64 (Site: Right Upper Arm, Position: Sitting, Cuff Size: Thigh)   Pulse 106   Temp 97.8 °F (36.6 °C) (Temporal)   Ht 4' 11.5\" (1.511 m)   Wt 207 lb 9.6 oz (94.2 kg)   BMI 41.23 kg/m²    WN, WD, NAD  Lung: Nml resp effort  Psych: Normal mood   Full affect  Neuro: Moves all ext well  ______________________    HISTORY & ASSESSMENT/PLAN -     Problem 1  - Hypothyroidism   HPI   - 8/16/21 TSH 2.660 on LT4 50 mcg      Symptoms: +fatigue, + weight gain, + diaphoresis, no tremors or palpitations, no C/D, some insomnia  Assessment  - Controlled; stable for >2 months  Plan   - Cont with present dose      Recheck TSH every 6 - 12 mo or with a 30 lb wt change    Problem 2  - Obesity   HPI   - See above Background for description    Weight  Date    207.6 lbs 9/02/21  DEN = 1550 harry/d = 10,850 harry/wk  Wt effect of HR food =  harry/wk = 31 harry/d = 3 lbs/yr  Very distressed at wt gain despite not eating much and exercise. States she has not changed any of her eating habits/routines since this started beginning 10 months ago at the time of the beginning of her thyroiditis. Reported diet is fairly clean of HR foods; so there is no obvious source of excess calories. Because the lack of an identifiable cause for her wt gain, will implement a VLCD. She would benefit from an appetite suppressant. However, her resistant HTN (four drug regimen) precludes phentermine. She broke out in hives with bupropion. Topiramate is the only remaining option.  However, she is hesitant about the side effect profile and prefers to trial the VLCD without it for a week or two. If she feels an appetite suppressant is needed, then she will contact me. Assessment  - Uncontrolled  Plan   -   Patient Instructions     Breakfast -     one high protein shake                            + 20 grams of fiber. Do this by either eating 12 tablespoons of the original, plain Fiber One cereal every day or 4 tablespoons of wheat dextrin powder (Benefiber or a generic brand) every day. Work up to this amount slowly by starting with only one-eighth to one-fourth of the target amount and then adding another one-eighth to one-fourth every one or two weeks until reaching the target. Lunch -           one high protein shake       Dinner -          one frozen meal                           + one snack item     Shake options (<200 harry, >25 grams/protein) :  Nectar, Pure Protein, Premier, Fairlife, Boost Max, [employer] Max, BeneProtein and [employer] (which is lactose-free) are milk-based options; Nectar, Premier Protein Clear, IsoPure Protein Drink, and Protein 2 O are water-based options; (Premier Protein Clear, the water-based option, comes in a 20 oz bottle with 20 grams of protein and 90 calories. So you have to drink three each day which increases the cost.)  (Disclaimer: Dietary supplements rarely have their listed ingredients and the amount of each verified by a third party other. Sometimes they give verification for their claims to be GMO and gluten free and to be organic.  However, even such verifications as these may still be untrustworthy.)     Fat snack options (<150 harry, >11 grams of fat): 22 almonds, 1 1/2 tablespoon of a oil-based dressing or 4 tablespoons of [place] dressing on a bed of salad greens, 1 1/2 tablespoons of peanut butter     Snack options (<100 harry, no sweets): fruit, low fat/high protein [place] yogurt, mozzarella cheese stick, nuts, salad with dressing, peanut butter, chips/crackers/pretzels     Frozen meal options (<350 harry):  Weight Watchers Smart Ones, LightSide Labs, Healthy Choice, Mary's, Holli's, etc     Take a multivitamin daily     Walk 30 min every day    Total time spent on encounter: 65 min    Felton Gudino MD  Endocrinology/Obesity  9/2/21

## 2021-09-02 NOTE — PATIENT INSTRUCTIONS
Breakfast -     one high protein shake                            + 20 grams of fiber. Do this by either eating 12 tablespoons of the original, plain Fiber One cereal every day or 4 tablespoons of wheat dextrin powder (Benefiber or a generic brand) every day. Work up to this amount slowly by starting with only one-eighth to one-fourth of the target amount and then adding another one-eighth to one-fourth every one or two weeks until reaching the target. Lunch -           one high protein shake       Dinner -          one frozen meal                           + one snack item     Shake options (<200 harry, >25 grams/protein) :  Nectar, Pure Protein, Premier, Fairlife, Boost Max, [employer] Max, BeneProtein and [employer] (which is lactose-free) are milk-based options; Nectar, Premier Protein Clear, IsoPure Protein Drink, and Protein 2 O are water-based options; (Premier Protein Clear, the water-based option, comes in a 20 oz bottle with 20 grams of protein and 90 calories. So you have to drink three each day which increases the cost.)  (Disclaimer: Dietary supplements rarely have their listed ingredients and the amount of each verified by a third party other. Sometimes they give verification for their claims to be GMO and gluten free and to be organic.  However, even such verifications as these may still be untrustworthy.)     Fat snack options (<150 harry, >11 grams of fat): 22 almonds, 1 1/2 tablespoon of a oil-based dressing or 4 tablespoons of [place] dressing on a bed of salad greens, 1 1/2 tablespoons of peanut butter     Snack options (<100 harry, no sweets): fruit, low fat/high protein [place] yogurt, mozzarella cheese stick, nuts, salad with dressing, peanut butter, chips/crackers/pretzels     Frozen meal options (<350 harry):  Weight Watchers Smart Ones, Lean Cuisine, Healthy Choice, Mary's, Holli's, etc     Take a multivitamin daily     Walk 30 min every day

## 2021-09-03 ENCOUNTER — OFFICE VISIT (OUTPATIENT)
Dept: SURGERY | Age: 51
End: 2021-09-03

## 2021-09-03 VITALS
TEMPERATURE: 97.4 F | BODY MASS INDEX: 40.99 KG/M2 | WEIGHT: 206.4 LBS | DIASTOLIC BLOOD PRESSURE: 78 MMHG | HEART RATE: 98 BPM | SYSTOLIC BLOOD PRESSURE: 132 MMHG

## 2021-09-03 DIAGNOSIS — Z09 POSTOPERATIVE EXAMINATION: Primary | ICD-10-CM

## 2021-09-03 PROCEDURE — 99024 POSTOP FOLLOW-UP VISIT: CPT | Performed by: SURGERY

## 2021-09-03 NOTE — PROGRESS NOTES
Patient presents for postoperative visit. She reports she is overall doing fairly well. She is having some soreness in her left upper quadrant incision after carrying some things around the other day. On exam incisions are intact. No evidence of hernia. Overall she is recovering appropriately. Continue to increase activity as tolerated. Follow-up as needed.

## 2021-09-07 DIAGNOSIS — M25.569 ARTHRALGIA OF KNEE, UNSPECIFIED LATERALITY: ICD-10-CM

## 2021-09-07 RX ORDER — MELOXICAM 7.5 MG/1
7.5 TABLET ORAL DAILY
Qty: 30 TABLET | Refills: 5 | Status: SHIPPED
Start: 2021-09-07 | End: 2021-12-09 | Stop reason: SDUPTHER

## 2021-09-08 ENCOUNTER — TELEPHONE (OUTPATIENT)
Dept: BARIATRICS/WEIGHT MGMT | Age: 51
End: 2021-09-08

## 2021-09-08 DIAGNOSIS — F32.89 OTHER DEPRESSION: Primary | ICD-10-CM

## 2021-09-08 DIAGNOSIS — F41.9 ANXIETY: ICD-10-CM

## 2021-09-08 RX ORDER — VENLAFAXINE HYDROCHLORIDE 75 MG/1
CAPSULE, EXTENDED RELEASE ORAL
Qty: 90 CAPSULE | Refills: 2 | Status: SHIPPED
Start: 2021-09-08 | End: 2021-10-04 | Stop reason: SDUPTHER

## 2021-09-08 NOTE — TELEPHONE ENCOUNTER
Pt would like to start an appetite suppressant  At the time of her visit on 9/2/21, the plan was to start topiramate  However, after more thought and discussion, will first trial Effexor for two weeks. If no effect, then will try topiramate.   Avel 45  09/08/21

## 2021-09-17 ENCOUNTER — TELEPHONE (OUTPATIENT)
Dept: FAMILY MEDICINE CLINIC | Age: 51
End: 2021-09-17

## 2021-09-17 NOTE — TELEPHONE ENCOUNTER
[facility] from [place] [address] MRI dept called and said they have Marda Cowden scheduled for Sunday and her order says to do her liver MRI with elastography. They do not do elastography with liver MRI there. Do you want just a regular w and wo contrast or do you want to cancel it?

## 2021-09-19 ENCOUNTER — HOSPITAL ENCOUNTER (OUTPATIENT)
Dept: MRI IMAGING | Age: 51
Discharge: HOME | End: 2021-09-21
Payer: MEDICARE

## 2021-09-19 DIAGNOSIS — K76.0 FATTY LIVER: ICD-10-CM

## 2021-09-19 DIAGNOSIS — D18.03 HEPATIC HEMANGIOMA: ICD-10-CM

## 2021-09-19 PROCEDURE — 6360000004 HC RX CONTRAST MEDICATION: Performed by: RADIOLOGY

## 2021-09-19 PROCEDURE — A9579 GAD-BASE MR CONTRAST NOS,1ML: HCPCS | Performed by: RADIOLOGY

## 2021-09-19 PROCEDURE — 74183 MRI ABD W/O CNTR FLWD CNTR: CPT

## 2021-09-19 RX ADMIN — GADOTERIDOL 19 ML: 279.3 INJECTION, SOLUTION INTRAVENOUS at 08:45

## 2021-09-27 DIAGNOSIS — I10 ESSENTIAL HYPERTENSION: ICD-10-CM

## 2021-09-27 RX ORDER — TRIAMTERENE AND HYDROCHLOROTHIAZIDE 37.5; 25 MG/1; MG/1
1 TABLET ORAL DAILY
Qty: 30 TABLET | Refills: 5 | Status: SHIPPED
Start: 2021-09-27 | End: 2022-05-11

## 2021-10-04 ENCOUNTER — OFFICE VISIT (OUTPATIENT)
Dept: BARIATRICS/WEIGHT MGMT | Age: 51
End: 2021-10-04
Payer: MEDICARE

## 2021-10-04 VITALS
BODY MASS INDEX: 39.78 KG/M2 | HEART RATE: 97 BPM | DIASTOLIC BLOOD PRESSURE: 75 MMHG | WEIGHT: 202.6 LBS | SYSTOLIC BLOOD PRESSURE: 127 MMHG | TEMPERATURE: 97.2 F | HEIGHT: 60 IN

## 2021-10-04 DIAGNOSIS — F41.9 ANXIETY: ICD-10-CM

## 2021-10-04 DIAGNOSIS — E06.3 HYPOTHYROIDISM DUE TO HASHIMOTO'S THYROIDITIS: Primary | ICD-10-CM

## 2021-10-04 DIAGNOSIS — F32.89 OTHER DEPRESSION: ICD-10-CM

## 2021-10-04 DIAGNOSIS — E66.01 CLASS 3 SEVERE OBESITY DUE TO EXCESS CALORIES WITHOUT SERIOUS COMORBIDITY WITH BODY MASS INDEX (BMI) OF 40.0 TO 44.9 IN ADULT (HCC): ICD-10-CM

## 2021-10-04 DIAGNOSIS — E03.8 HYPOTHYROIDISM DUE TO HASHIMOTO'S THYROIDITIS: Primary | ICD-10-CM

## 2021-10-04 PROCEDURE — 99211 OFF/OP EST MAY X REQ PHY/QHP: CPT | Performed by: INTERNAL MEDICINE

## 2021-10-04 PROCEDURE — 99214 OFFICE O/P EST MOD 30 MIN: CPT | Performed by: INTERNAL MEDICINE

## 2021-10-04 RX ORDER — VENLAFAXINE HYDROCHLORIDE 75 MG/1
CAPSULE, EXTENDED RELEASE ORAL
Qty: 90 CAPSULE | Refills: 2 | Status: SHIPPED
Start: 2021-10-04 | End: 2021-11-02 | Stop reason: SDUPTHER

## 2021-10-04 NOTE — PROGRESS NOTES
CC -   Hypothyroidism, Obesity    BACKGROUND -   Last visit: 9/2/21  First visit: 9/2/21     Obesity   Began in late 19's  Initial BMI 41.2, Wt 207.6 lbs, Ht 4' 11.5\"  HS Grad wt 125 lbs  Lowest   wt 125 lbs   Highest  wt  207 lbs  Pattern of wt gain: grad until rapid the past year  Wt change past yr: +22 lbs  Most wt lost:  12 lbs (exercise)  Other diets attempted: none    Desire to lose weight: 10/10  Prob posed by appetite: 5/10    Initial Diet:    Number of meals per day - 2-3    Number of snacks per day - 1    Meal volume - 12\" plate, sometimes seconds    Fast food/convenience store - 0x/week    Restaurants (not fast food) - 1x/week   Sweets - 0-1d/week   Chips - 0d/week   Crackers/pretzels - 0d/week   Nuts - 0d/week   Peanut Butter - 0d/week   Popcorn - 0-1d/week   Dried fruit - 0d/week   Whole fruit - 0d/week   Breakfast cereal - 2-3d/week (one serving)   Granola/Protein/Energy bar - 0d/week   Sugar sweetened beverages - 16-20 oz reg soda/wk   Protein - No supplements   Fiber - No supplements     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)    Walking - 30 min/d (eliptical), 3d/wk    Running - none    Resistance - 45 min/d, 3d/wk    Aerobic class - none    ______________________    [facility] [place] -  Past Medical History:   Diagnosis Date    Class 3 severe obesity due to excess calories without serious comorbidity with body mass index (BMI) of 40.0 to 44.9 in adult [facility])     Essential hypertension 06/03/2019    Gallstones     Hip arthritis     B/L BRAXTON    Hypothyroidism due to Hashimoto's thyroiditis     Mixed hyperlipidemia 06/03/2019    Non-rheumatic mitral regurgitation 06/03/2019     Current Outpatient Medications   Medication Sig Dispense Refill    triamterene-hydroCHLOROthiazide (MAXZIDE-25) 37.5-25 MG per tablet Take 1 tablet by mouth daily 30 tablet 5    venlafaxine (EFFEXOR XR) 75 MG extended release capsule Take one tablet daily x4d, then two tablets daily x4d and then three tablets daily from then on 90 capsule 2    meloxicam (MOBIC) 7.5 MG tablet Take 1 tablet by mouth daily 30 tablet 5    hydrOXYzine (VISTARIL) 25 MG capsule TAKE 1 CAPSULE 2 TIMES DAILY AS NEEDED      lisinopril (PRINIVIL;ZESTRIL) 10 MG tablet Take 1 tablet by mouth daily 30 tablet 5    levothyroxine (SYNTHROID) 50 MCG tablet Take 1 tablet by mouth daily 30 tablet 11    vitamin D3 (CHOLECALCIFEROL) 25 MCG (1000 UT) TABS tablet Take 2 tablets by mouth daily 60 tablet 5    atenolol (TENORMIN) 50 MG tablet Take 1 tablet by mouth daily 90 tablet 1    FETZIMA 120 MG CP24 extended release capsule 120 mg daily   0     No current facility-administered medications for this visit. PE -  Gen : /75 (Site: Left Upper Arm, Position: Sitting, Cuff Size: Large Adult)   Pulse 97   Temp 97.2 °F (36.2 °C) (Temporal)   Ht 4' 11.5\" (1.511 m)   Wt 202 lb 9.6 oz (91.9 kg)   BMI 40.24 kg/m²    WN, WD, NAD  Lung: Nml resp effort  Psych: Normal mood   Full affect  Neuro: Moves all ext well  ______________________    HISTORY & ASSESSMENT/PLAN -     Problem 1  - Hypothyroidism   HPI   - 8/16/21 TSH 2.660 on LT4 50 mcg      Symptoms: +fatigue, + weight gain, + diaphoresis, no tremors or palpitations, no C, + diarrhea b/c of having a yunior, some insomnia  Assessment  - Controlled; stable for >2 months  Plan   - Cont with present dose      Recheck TSH every 6 - 12 mo or with a 30 lb wt change    Problem 2  - Obesity   HPI   - See above Background for description    Weight  Date    207.6 lbs   9/02/21    202.6 lbs 10/04/21  Total wt loss to date: - 5.0 lbs  DEN = 1550 harry/d = 10,850 harry/wk  Avg daily energy variance:   9/02/21 -  10/04/21 = - 3.4 lbs (11,900 harry)/31d = -383 harry/d  Wt effect of HR food =  harry/wk = 31 harry/d = 3 lbs/yr  Very distressed at wt gain despite not eating much and exercise.  States she has not changed any of her eating habits/routines since this started beginning 10 months ago at the time of the beginning of her thyroiditis. Reported diet is fairly clean of HR foods; so there is no obvious source of excess calories. Because the lack of an identifiable cause for her wt gain, will implement a VLCD. She would benefit from an appetite suppressant. However, her resistant HTN (four drug regimen) precludes phentermine. She broke out in hives with bupropion. Topiramate is the only remaining option. However, she is hesitant about the side effect profile and prefers to trial the VLCD without it for a week or two. If she feels an appetite suppressant is needed, then she will contact me. Update:  Drinks two protein shakes each day  Using home prepared meals   2.5 miles on elliptical 4x/wk  Requested an appetite suppressant 9/8/21. After further thought, I felt it was best to trial Effexor over Topiramate. I sent in a prescription for it. However, the pharmacy did not contact her that it was filled. Will send it in again and stay with the plan to switch to topiramate if no appetite suppression  Assessment  - Improved  Plan   -   Patient Instructions     Breakfast -     one high protein shake                            + 20 grams of fiber. Do this by either eating 12 tablespoons of the original, plain Fiber One cereal every day or 4 tablespoons of wheat dextrin powder (Benefiber or a generic brand) every day. Work up to this amount slowly by starting with only one-eighth to one-fourth of the target amount and then adding another one-eighth to one-fourth every one or two weeks until reaching the target. Lunch -           one high protein shake       Dinner -          one frozen meal                           + one snack item     Shake options (<200 harry, >25 grams/protein) :  Nectar, Pure Protein, Premier, Fairlife, Boost Max, [employer] Max, BeneProtein and [employer] (which is lactose-free) are milk-based options;  Nectar, Premier Protein Clear, IsoPure Protein Drink, and Protein 2 O are water-based options; (Premier Protein Clear, the water-based option, comes in a 20 oz bottle with 20 grams of protein and 90 calories. So you have to drink three each day which increases the cost.)  (Disclaimer: Dietary supplements rarely have their listed ingredients and the amount of each verified by a third party other. Sometimes they give verification for their claims to be GMO and gluten free and to be organic. However, even such verifications as these may still be untrustworthy.)     Fat snack options (<150 harry, >11 grams of fat): 22 almonds, 1 1/2 tablespoon of a oil-based dressing or 4 tablespoons of [place] dressing on a bed of salad greens, 1 1/2 tablespoons of peanut butter     Snack options (<100 harry, no sweets): fruit, low fat/high protein [place] yogurt, mozzarella cheese stick, nuts, salad with dressing, peanut butter, chips/crackers/pretzels     Frozen meal options (<350 harry):  Weight Watchers Smart Ones, [employer] Cuisine, Healthy Choice, Mary's, Holli's, etc     Take a multivitamin daily     Walk 30 min every day    Take Effexor ER 75 mg, one tablet daily for four days. If no appetite suppression, then increase at that time to two tablets daily. If no appetite suppression after four days, then increase to three tablets daily.  If still no appetite suppression after one week, then stop it and contact me      Farhan Muniz MD  Endocrinology/Obesity  10/4/21

## 2021-10-07 DIAGNOSIS — E05.90 HYPERTHYROIDISM: ICD-10-CM

## 2021-10-07 RX ORDER — ATENOLOL 50 MG/1
50 TABLET ORAL DAILY
Qty: 90 TABLET | Refills: 1 | Status: SHIPPED
Start: 2021-10-07 | End: 2022-03-30

## 2021-11-02 ENCOUNTER — OFFICE VISIT (OUTPATIENT)
Dept: BARIATRICS/WEIGHT MGMT | Age: 51
End: 2021-11-02
Payer: MEDICARE

## 2021-11-02 VITALS
HEART RATE: 87 BPM | SYSTOLIC BLOOD PRESSURE: 139 MMHG | WEIGHT: 200.4 LBS | HEIGHT: 60 IN | TEMPERATURE: 97 F | DIASTOLIC BLOOD PRESSURE: 78 MMHG | BODY MASS INDEX: 39.34 KG/M2

## 2021-11-02 DIAGNOSIS — E06.3 HYPOTHYROIDISM DUE TO HASHIMOTO'S THYROIDITIS: Primary | ICD-10-CM

## 2021-11-02 DIAGNOSIS — E03.8 HYPOTHYROIDISM DUE TO HASHIMOTO'S THYROIDITIS: Primary | ICD-10-CM

## 2021-11-02 DIAGNOSIS — E66.01 CLASS 3 SEVERE OBESITY DUE TO EXCESS CALORIES WITHOUT SERIOUS COMORBIDITY WITH BODY MASS INDEX (BMI) OF 40.0 TO 44.9 IN ADULT (HCC): ICD-10-CM

## 2021-11-02 PROCEDURE — 99214 OFFICE O/P EST MOD 30 MIN: CPT | Performed by: INTERNAL MEDICINE

## 2021-11-02 PROCEDURE — 99211 OFF/OP EST MAY X REQ PHY/QHP: CPT

## 2021-11-02 RX ORDER — VENLAFAXINE HYDROCHLORIDE 75 MG/1
75 CAPSULE, EXTENDED RELEASE ORAL DAILY
Qty: 90 CAPSULE | Refills: 1 | Status: SHIPPED
Start: 2021-11-02 | End: 2021-12-20 | Stop reason: SDUPTHER

## 2021-11-02 NOTE — PATIENT INSTRUCTIONS
Breakfast -     one high protein shake                            + 20 grams of fiber. Do this by either eating 12 tablespoons of the original, plain Fiber One cereal every day or 4 tablespoons of wheat dextrin powder (Benefiber or a generic brand) every day. Work up to this amount slowly by starting with only one-eighth to one-fourth of the target amount and then adding another one-eighth to one-fourth every one or two weeks until reaching the target. Lunch -           one high protein shake       Dinner -          one frozen meal                           + one snack item     Shake options (<200 harry, >25 grams/protein) :  Nectar, Pure Protein, Premier, Fairlife, Boost Max, [employer] Max, BeneProtein and [employer] (which is lactose-free) are milk-based options; Nectar, Premier Protein Clear, IsoPure Protein Drink, and Protein 2 O are water-based options; (Premier Protein Clear, the water-based option, comes in a 20 oz bottle with 20 grams of protein and 90 calories. So you have to drink three each day which increases the cost.)  (Disclaimer: Dietary supplements rarely have their listed ingredients and the amount of each verified by a third party other. Sometimes they give verification for their claims to be GMO and gluten free and to be organic.  However, even such verifications as these may still be untrustworthy.)     Fat snack options (<150 harry, >11 grams of fat): 22 almonds, 1 1/2 tablespoon of a oil-based dressing or 4 tablespoons of [place] dressing on a bed of salad greens, 1 1/2 tablespoons of peanut butter     Snack options (<100 harry, no sweets): fruit, low fat/high protein [place] yogurt, mozzarella cheese stick, nuts, salad with dressing, peanut butter, chips/crackers/pretzels     Frozen meal options (<350 harry):  Weight Watchers Smart Ones, [employer] Cuisine, Healthy Choice, Mary's, Holli's, etc     Take a multivitamin daily     Walk 30 min every day    Take Effexor ER 75 mg, one tablet daily

## 2021-11-02 NOTE — PROGRESS NOTES
CC -   Hypothyroidism, Obesity    BACKGROUND -   Last visit: 9/2/21  First visit: 9/2/21     Obesity   Began in late 19's  Initial BMI 41.2, Wt 207.6 lbs, Ht 4' 11.5\"  HS Grad wt 125 lbs  Lowest   wt 125 lbs   Highest  wt  207 lbs  Pattern of wt gain: grad until rapid the past year  Wt change past yr: +22 lbs  Most wt lost:  12 lbs (exercise)  Other diets attempted: none    Desire to lose weight: 10/10  Prob posed by appetite: 5/10    Initial Diet:    Number of meals per day - 2-3    Number of snacks per day - 1    Meal volume - 12\" plate, sometimes seconds    Fast food/convenience store - 0x/week    Restaurants (not fast food) - 1x/week   Sweets - 0-1d/week   Chips - 0d/week   Crackers/pretzels - 0d/week   Nuts - 0d/week   Peanut Butter - 0d/week   Popcorn - 0-1d/week   Dried fruit - 0d/week   Whole fruit - 0d/week   Breakfast cereal - 2-3d/week (one serving)   Granola/Protein/Energy bar - 0d/week   Sugar sweetened beverages - 16-20 oz reg soda/wk   Protein - No supplements   Fiber - No supplements     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)    Walking - 30 min/d (eliptical), 3d/wk    Running - none    Resistance - 45 min/d, 3d/wk    Aerobic class - none    ______________________    [facility] [place] -  Past Medical History:   Diagnosis Date    Class 3 severe obesity due to excess calories without serious comorbidity with body mass index (BMI) of 40.0 to 44.9 in adult [facility])     Essential hypertension 06/03/2019    Gallstones     Hip arthritis     B/L BRAXTON    Hypothyroidism due to Hashimoto's thyroiditis     Mixed hyperlipidemia 06/03/2019    Non-rheumatic mitral regurgitation 06/03/2019     Current Outpatient Medications   Medication Sig Dispense Refill    atenolol (TENORMIN) 50 MG tablet Take 1 tablet by mouth daily 90 tablet 1    venlafaxine (EFFEXOR XR) 75 MG extended release capsule Take one tablet daily x4d, then two tablets daily x4d and then three tablets daily from then on 90 capsule 2    triamterene-hydroCHLOROthiazide (MAXZIDE-25) 37.5-25 MG per tablet Take 1 tablet by mouth daily 30 tablet 5    meloxicam (MOBIC) 7.5 MG tablet Take 1 tablet by mouth daily 30 tablet 5    hydrOXYzine (VISTARIL) 25 MG capsule TAKE 1 CAPSULE 2 TIMES DAILY AS NEEDED      lisinopril (PRINIVIL;ZESTRIL) 10 MG tablet Take 1 tablet by mouth daily 30 tablet 5    levothyroxine (SYNTHROID) 50 MCG tablet Take 1 tablet by mouth daily 30 tablet 11    vitamin D3 (CHOLECALCIFEROL) 25 MCG (1000 UT) TABS tablet Take 2 tablets by mouth daily 60 tablet 5    FETZIMA 120 MG CP24 extended release capsule 120 mg daily   0     No current facility-administered medications for this visit. PE -  Gen : /78 (Site: Left Upper Arm, Position: Sitting, Cuff Size: Large Adult)   Pulse 87   Temp 97 °F (36.1 °C) (Temporal)   Ht 4' 11.5\" (1.511 m)   Wt 200 lb 6.4 oz (90.9 kg)   BMI 39.80 kg/m²    WN, WD, NAD  Lung: Nml resp effort  Psych: Normal mood   Full affect  Neuro: Moves all ext well  ______________________    HISTORY & ASSESSMENT/PLAN -     Problem 1  - Hypothyroidism   HPI   - 8/16/21 TSH 2.660 on LT4 50 mcg      Symptoms: +fatigue, + weight gain, + diaphoresis, no tremors or palpitations, no C, + diarrhea b/c of having a yunior, some insomnia  Assessment  - Controlled; stable for >2 months  Plan   - Cont with present dose      Recheck TSH every 6 - 12 mo or with a 30 lb wt change    Problem 2  - Obesity   HPI   - See above Background for description    Weight  Date    207.6 lbs   9/02/21    202.6 lbs 10/04/21    200.4 lbs 11/02/21  Total wt loss to date: - 7.2 lbs  DEN = 1550 harry/d = 10,850 harry/wk  Avg daily energy variance:   09/02/21 -  10/04/21 = - 3.4 lbs (11,900 harry)/31d = -383 harry/d   10/04/21 - 11/02/21 = 2.2 lbs ( 7,700 harry)/29d = -265 harry/d  Wt effect of HR food =  harry/wk = 31 harry/d = 3 lbs/yr  Very distressed at wt gain despite not eating much and exercise.  States she has not changed any of her eating habits/routines since this started beginning 10 months ago at the time of the beginning of her thyroiditis. Reported diet is fairly clean of HR foods; so there is no obvious source of excess calories. Because the lack of an identifiable cause for her wt gain, will implement a VLCD. She would benefit from an appetite suppressant. However, her resistant HTN (four drug regimen) precludes phentermine. She broke out in hives with bupropion. Topiramate is the only remaining option. However, she is hesitant about the side effect profile and prefers to trial the VLCD without it for a week or two. If she feels an appetite suppressant is needed, then she will contact me. Update: Follows her VLCD 4-5d/wk, other days eats a home prepared meal (one of them is a restaurant meal)  Benefiber 1 tablespoon daily  2.5 miles on elliptical 4x/wk  She very frustrated at the slow rate of weight loss. However, she was accepting of the explanation that it was due to a greater effect of her non-adherence than she was recognizing. Requested an appetite suppressant 9/8/21. I felt it was best to trial Effexor over Topiramate. I sent in a prescription for it. Still has not received it. Will send it in again and stay with the plan to switch to topiramate if no appetite suppression  Assessment  - Improved  Plan   -   Patient Instructions     Breakfast -     one high protein shake                            + 20 grams of fiber. Do this by either eating 12 tablespoons of the original, plain Fiber One cereal every day or 4 tablespoons of wheat dextrin powder (Benefiber or a generic brand) every day. Work up to this amount slowly by starting with only one-eighth to one-fourth of the target amount and then adding another one-eighth to one-fourth every one or two weeks until reaching the target.      Lunch -           one high protein shake       Dinner -          one frozen meal                           + one snack item     Shake options (<200 harry, >25 grams/protein) :  Nectar, Pure Protein, Premier, Fairlife, Boost Max, [employer] Max, BeneProtein and [employer] (which is lactose-free) are milk-based options; Nectar, Premier Protein Clear, IsoPure Protein Drink, and Protein 2 O are water-based options; (Premier Protein Clear, the water-based option, comes in a 20 oz bottle with 20 grams of protein and 90 calories. So you have to drink three each day which increases the cost.)  (Disclaimer: Dietary supplements rarely have their listed ingredients and the amount of each verified by a third party other. Sometimes they give verification for their claims to be GMO and gluten free and to be organic.  However, even such verifications as these may still be untrustworthy.)     Fat snack options (<150 harry, >11 grams of fat): 22 almonds, 1 1/2 tablespoon of a oil-based dressing or 4 tablespoons of [place] dressing on a bed of salad greens, 1 1/2 tablespoons of peanut butter     Snack options (<100 harry, no sweets): fruit, low fat/high protein [place] yogurt, mozzarella cheese stick, nuts, salad with dressing, peanut butter, chips/crackers/pretzels     Frozen meal options (<350 harry):  Weight Watchers Smart Ones, [employer] Cuisine, Healthy Choice, Mary's, Holli's, etc     Take a multivitamin daily     Walk 30 min every day    Take Effexor ER 75 mg, one tablet daily    Time spent on the encounter: 32 min    Juventino Rodríguez MD  Endocrinology/Obesity  11/2/21

## 2021-12-09 ENCOUNTER — OFFICE VISIT (OUTPATIENT)
Dept: PRIMARY CARE CLINIC | Age: 51
End: 2021-12-09
Payer: MEDICARE

## 2021-12-09 VITALS
OXYGEN SATURATION: 98 % | SYSTOLIC BLOOD PRESSURE: 106 MMHG | BODY MASS INDEX: 39.27 KG/M2 | WEIGHT: 200 LBS | DIASTOLIC BLOOD PRESSURE: 62 MMHG | HEART RATE: 100 BPM | HEIGHT: 60 IN | TEMPERATURE: 97.6 F

## 2021-12-09 DIAGNOSIS — E03.8 HYPOTHYROIDISM DUE TO HASHIMOTO'S THYROIDITIS: ICD-10-CM

## 2021-12-09 DIAGNOSIS — M25.569 ARTHRALGIA OF KNEE, UNSPECIFIED LATERALITY: ICD-10-CM

## 2021-12-09 DIAGNOSIS — R74.8 ELEVATED LIVER ENZYMES: ICD-10-CM

## 2021-12-09 DIAGNOSIS — E66.09 CLASS 2 OBESITY DUE TO EXCESS CALORIES WITHOUT SERIOUS COMORBIDITY WITH BODY MASS INDEX (BMI) OF 39.0 TO 39.9 IN ADULT: ICD-10-CM

## 2021-12-09 DIAGNOSIS — Z12.11 SCREEN FOR COLON CANCER: ICD-10-CM

## 2021-12-09 DIAGNOSIS — E55.9 VITAMIN D DEFICIENCY: ICD-10-CM

## 2021-12-09 DIAGNOSIS — I10 ESSENTIAL HYPERTENSION: Primary | ICD-10-CM

## 2021-12-09 DIAGNOSIS — E06.3 HYPOTHYROIDISM DUE TO HASHIMOTO'S THYROIDITIS: ICD-10-CM

## 2021-12-09 DIAGNOSIS — E78.2 MIXED HYPERLIPIDEMIA: ICD-10-CM

## 2021-12-09 PROCEDURE — 99214 OFFICE O/P EST MOD 30 MIN: CPT | Performed by: INTERNAL MEDICINE

## 2021-12-09 RX ORDER — MELOXICAM 7.5 MG/1
7.5 TABLET ORAL DAILY
Qty: 90 TABLET | Refills: 3 | Status: SHIPPED | OUTPATIENT
Start: 2021-12-09

## 2021-12-09 RX ORDER — LISINOPRIL 10 MG/1
10 TABLET ORAL DAILY
Qty: 90 TABLET | Refills: 3 | Status: SHIPPED
Start: 2021-12-09 | End: 2022-06-01 | Stop reason: SDUPTHER

## 2021-12-09 SDOH — ECONOMIC STABILITY: FOOD INSECURITY: WITHIN THE PAST 12 MONTHS, YOU WORRIED THAT YOUR FOOD WOULD RUN OUT BEFORE YOU GOT MONEY TO BUY MORE.: NEVER TRUE

## 2021-12-09 SDOH — ECONOMIC STABILITY: FOOD INSECURITY: WITHIN THE PAST 12 MONTHS, THE FOOD YOU BOUGHT JUST DIDN'T LAST AND YOU DIDN'T HAVE MONEY TO GET MORE.: NEVER TRUE

## 2021-12-09 ASSESSMENT — ENCOUNTER SYMPTOMS
TROUBLE SWALLOWING: 0
COUGH: 0
STRIDOR: 0
RHINORRHEA: 0
EYE DISCHARGE: 0
FACIAL SWELLING: 0
WHEEZING: 0
PHOTOPHOBIA: 0
EYE PAIN: 0
COLOR CHANGE: 0
SORE THROAT: 0
EYE ITCHING: 0
BLOOD IN STOOL: 0
SHORTNESS OF BREATH: 0
ABDOMINAL PAIN: 0
CONSTIPATION: 0
VOMITING: 0
DIARRHEA: 0
ANAL BLEEDING: 0
NAUSEA: 0

## 2021-12-09 ASSESSMENT — SOCIAL DETERMINANTS OF HEALTH (SDOH): HOW HARD IS IT FOR YOU TO PAY FOR THE VERY BASICS LIKE FOOD, HOUSING, MEDICAL CARE, AND HEATING?: NOT VERY HARD

## 2021-12-09 NOTE — ASSESSMENT & PLAN NOTE
watch diet and if she continues to lose weight her lipids should improve.   We will recheck her lipids

## 2021-12-09 NOTE — ASSESSMENT & PLAN NOTE
Blood pressures are stable. Continue medications and monitor blood pressures at home. Call office if systolics are over 014 over diastolics over 90.

## 2021-12-09 NOTE — ASSESSMENT & PLAN NOTE
she did not receive her Cologuard kit so we will send again for 1. She promises to do it as soon as she gets it. She is at low to moderate risk for colon cancer. She has no symptoms, she has no family history of colon cancer or colon polyps.

## 2021-12-13 DIAGNOSIS — E78.2 MIXED HYPERLIPIDEMIA: ICD-10-CM

## 2021-12-13 DIAGNOSIS — R74.8 ELEVATED LIVER ENZYMES: ICD-10-CM

## 2021-12-13 LAB
ALBUMIN SERPL-MCNC: 4.5 G/DL (ref 3.5–5.2)
ALP BLD-CCNC: 96 U/L (ref 35–104)
ALT SERPL-CCNC: 33 U/L (ref 0–32)
AST SERPL-CCNC: 40 U/L (ref 0–31)
BILIRUB SERPL-MCNC: 0.5 MG/DL (ref 0–1.2)
BILIRUBIN DIRECT: <0.2 MG/DL (ref 0–0.3)
BILIRUBIN, INDIRECT: ABNORMAL MG/DL (ref 0–1)
CHOLESTEROL, TOTAL: 240 MG/DL (ref 0–199)
HDLC SERPL-MCNC: 48 MG/DL
LDL CHOLESTEROL CALCULATED: 167 MG/DL (ref 0–99)
TOTAL PROTEIN: 7.6 G/DL (ref 6.4–8.3)
TRIGL SERPL-MCNC: 127 MG/DL (ref 0–149)
VLDLC SERPL CALC-MCNC: 25 MG/DL

## 2021-12-20 ENCOUNTER — OFFICE VISIT (OUTPATIENT)
Dept: BARIATRICS/WEIGHT MGMT | Age: 51
End: 2021-12-20
Payer: MEDICARE

## 2021-12-20 VITALS
SYSTOLIC BLOOD PRESSURE: 125 MMHG | TEMPERATURE: 97.3 F | BODY MASS INDEX: 38.64 KG/M2 | WEIGHT: 196.8 LBS | HEART RATE: 79 BPM | HEIGHT: 60 IN | DIASTOLIC BLOOD PRESSURE: 48 MMHG

## 2021-12-20 DIAGNOSIS — E06.3 HYPOTHYROIDISM DUE TO HASHIMOTO'S THYROIDITIS: Primary | ICD-10-CM

## 2021-12-20 DIAGNOSIS — E66.01 CLASS 3 SEVERE OBESITY DUE TO EXCESS CALORIES WITHOUT SERIOUS COMORBIDITY WITH BODY MASS INDEX (BMI) OF 40.0 TO 44.9 IN ADULT (HCC): ICD-10-CM

## 2021-12-20 DIAGNOSIS — E03.8 HYPOTHYROIDISM DUE TO HASHIMOTO'S THYROIDITIS: Primary | ICD-10-CM

## 2021-12-20 PROCEDURE — 99211 OFF/OP EST MAY X REQ PHY/QHP: CPT

## 2021-12-20 PROCEDURE — 99214 OFFICE O/P EST MOD 30 MIN: CPT | Performed by: INTERNAL MEDICINE

## 2021-12-20 RX ORDER — VENLAFAXINE HYDROCHLORIDE 75 MG/1
75 CAPSULE, EXTENDED RELEASE ORAL DAILY
Qty: 90 CAPSULE | Refills: 1 | Status: SHIPPED
Start: 2021-12-20 | End: 2022-06-13 | Stop reason: ALTCHOICE

## 2021-12-20 NOTE — PATIENT INSTRUCTIONS
Cont the same plan you are currently following  and use the meal plan below as a guide. Protein intake needs to be at least 55 grams/day  Fiber intake needs to be at least 20 grams/day      Breakfast -     one high protein shake                            + 20 grams of fiber. Do this by either eating 12 tablespoons of the original, plain Fiber One cereal every day or 4 tablespoons of wheat dextrin powder (Benefiber or a generic brand) every day. Work up to this amount slowly by starting with only one-eighth to one-fourth of the target amount and then adding another one-eighth to one-fourth every one or two weeks until reaching the target. Lunch -           one high protein shake       Dinner -          one frozen meal                           + one snack item     Shake options (<200 harry, >25 grams/protein) :  Nectar, Pure Protein, Premier, Fairlife, Boost Max, [employer] Max, BeneProtein and [employer] (which is lactose-free) are milk-based options; Nectar, Premier Protein Clear, IsoPure Protein Drink, and Protein 2 O are water-based options; (Premier Protein Clear, the water-based option, comes in a 20 oz bottle with 20 grams of protein and 90 calories. So you have to drink three each day which increases the cost.)  (Disclaimer: Dietary supplements rarely have their listed ingredients and the amount of each verified by a third party other. Sometimes they give verification for their claims to be GMO and gluten free and to be organic.  However, even such verifications as these may still be untrustworthy.)     Fat snack options (<150 harry, >11 grams of fat): 22 almonds, 1 1/2 tablespoon of a oil-based dressing or 4 tablespoons of [place] dressing on a bed of salad greens, 1 1/2 tablespoons of peanut butter     Snack options (<100 harry, no sweets): fruit, low fat/high protein [place] yogurt, mozzarella cheese stick, nuts, salad with dressing, peanut butter, chips/crackers/pretzels     Frozen meal options (<350 harry):  Weight Watchers Smart Ones, [employer] Cuisine, Healthy Choice, Mary's, Holli's, etc     Take a multivitamin daily     Walk 30 min every day    Stop Fetzima    Take Effexor ER 75 mg + 150 mg  daily

## 2021-12-20 NOTE — PROGRESS NOTES
CC -   Hypothyroidism, Obesity    BACKGROUND -   Last visit: 11/2/21  First visit:   9/2/21     Obesity   Began in late 19's  Initial BMI 41.2, Wt 207.6 lbs, Ht 4' 11.5\"  HS Grad wt 125 lbs  Lowest   wt 125 lbs   Highest  wt  207 lbs  Pattern of wt gain: grad until rapid the past year  Wt change past yr: +22 lbs  Most wt lost:  12 lbs (exercise)  Other diets attempted: none    Desire to lose weight: 10/10  Prob posed by appetite: 5/10    Initial Diet:    Number of meals per day - 2-3    Number of snacks per day - 1    Meal volume - 12\" plate, sometimes seconds    Fast food/convenience store - 0x/week    Restaurants (not fast food) - 1x/week   Sweets - 0-1d/week   Chips - 0d/week   Crackers/pretzels - 0d/week   Nuts - 0d/week   Peanut Butter - 0d/week   Popcorn - 0-1d/week   Dried fruit - 0d/week   Whole fruit - 0d/week   Breakfast cereal - 2-3d/week (one serving)   [place]/Protein/Energy bar - 0d/week   Sugar sweetened beverages - 16-20 oz reg soda/wk   Protein - No supplements   Fiber - No supplements     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)    Walking - 30 min/d (eliptical), 3d/wk    Running - none    Resistance - 45 min/d, 3d/wk    Aerobic class - none    ______________________    [facility] [place] -  Past Medical History:   Diagnosis Date    Class 3 severe obesity due to excess calories without serious comorbidity with body mass index (BMI) of 40.0 to 44.9 in adult [facility])     Essential hypertension 06/03/2019    Gallstones     Hip arthritis     B/L BRAXTON    Hypothyroidism due to Hashimoto's thyroiditis     Mixed hyperlipidemia 06/03/2019    Non-rheumatic mitral regurgitation 06/03/2019     Current Outpatient Medications   Medication Sig Dispense Refill    lisinopril (PRINIVIL;ZESTRIL) 10 MG tablet Take 1 tablet by mouth daily 90 tablet 3    meloxicam (MOBIC) 7.5 MG tablet Take 1 tablet by mouth daily 90 tablet 3    venlafaxine (EFFEXOR XR) 75 MG extended release capsule Take 1 capsule by mouth daily 90 capsule 1    atenolol (TENORMIN) 50 MG tablet Take 1 tablet by mouth daily 90 tablet 1    triamterene-hydroCHLOROthiazide (MAXZIDE-25) 37.5-25 MG per tablet Take 1 tablet by mouth daily 30 tablet 5    hydrOXYzine (VISTARIL) 25 MG capsule TAKE 1 CAPSULE 2 TIMES DAILY AS NEEDED      levothyroxine (SYNTHROID) 50 MCG tablet Take 1 tablet by mouth daily 30 tablet 11    vitamin D3 (CHOLECALCIFEROL) 25 MCG (1000 UT) TABS tablet Take 2 tablets by mouth daily 60 tablet 5    FETZIMA 120 MG CP24 extended release capsule 120 mg daily   0     No current facility-administered medications for this visit. PE -  Gen : BP (!) 125/48 (Site: Left Upper Arm, Position: Sitting, Cuff Size: Large Adult)   Pulse 79   Temp 97.3 °F (36.3 °C) (Temporal)   Ht 4' 11.5\" (1.511 m)   Wt 196 lb 12.8 oz (89.3 kg)   BMI 39.08 kg/m²    WN, WD, NAD  Heart:  RRR w/o MGR, no carotid bruits, no LE edema  Lung: Nml resp effort, CTA b/l  Psych: Normal mood   Full affect  Neuro:  Moves all ext well  ______________________    HISTORY & ASSESSMENT/PLAN -     Problem 1  - Hypothyroidism   HPI   - 8/16/21 TSH 2.660 on LT4 50 mcg      Symptoms: +fatigue, no weight gain, + diaphoresis, but less now, no tremors or palpitations, + constipation, no diarrhea, some insomnia  Assessment  - Controlled; stable for >2 months  Plan   - Cont with present dose      Recheck TSH every 6 - 12 mo or with a 30 lb wt change    Problem 2  - Obesity   HPI   - See above Background for description    Weight  Date    207.6 lbs   9/02/21    202.6 lbs 10/04/21    200.4 lbs 11/02/21    196.8 lbs 12/20/21  Total wt loss to date: - 10.8 lbs  DEN = 1550 harry/d = 10,850 harry/wk  Avg daily energy variance:   09/02/21 -  10/04/21 = - 3.4 lbs (11,900 harry)/31d = -383 harry/d deficit   10/04/21 - 11/02/21 = 2.2 lbs ( 7,700 harry)/29d = -265 harry/d deficit   11/02/21 - 12/20/21= - 3.6 lbs (12,600 harry)/48d = - 262 harry/d deficit  Wt effect of HR food =  harry/wk = 31 harry/d = 3 lbs/yr  Very distressed at wt gain despite not eating much and exercise. States she has not changed any of her eating habits/routines since this started beginning 10 months ago at the time of the beginning of her thyroiditis. Reported diet is fairly clean of HR foods; so there is no obvious source of excess calories. Because the lack of an identifiable cause for her wt gain, will implement a VLCD. She would benefit from an appetite suppressant. However, her resistant HTN (four drug regimen) precludes phentermine. She broke out in hives with bupropion. Did not want topiramate b/c of its side effect profile. I agreed with a trial of Effexor over Topiramate. Update:  Not drinking protein shakes b/c of concerns about constipation  B - skips  L - Salad with vinegar + oil, pepperoni, carrots, green peppers, parmesan cheese  D - Home prepared meal with portion control  Snacks - 2x/wk  Stopped benefiber when starting Miralax  2.5 miles on elliptical 4x/wk  Taking Effexor XR 75 mg, three tablets daily; appetite suppression 5/10, side effects - none  She would like to cont it  She is also taking Fetzima. Will therefore stop it  Assessment  - Improved, Cont same plan using the VLCD as a guide; make sure protein and fiber intake is adequate; stop Fetzima  Plan   -   Patient Instructions     Cont the same plan and use the below meal plan as a guide. Protein intake needs to be at least 55 grams/day  Fiber intake needs to be at least 20 grams/day    Breakfast -     one high protein shake                            + 20 grams of fiber. Do this by either eating 12 tablespoons of the original, plain Fiber One cereal every day or 4 tablespoons of wheat dextrin powder (Benefiber or a generic brand) every day. Work up to this amount slowly by starting with only one-eighth to one-fourth of the target amount and then adding another one-eighth to one-fourth every one or two weeks until reaching the target.      Lunch -           one high protein shake       Dinner -          one frozen meal                           + one snack item     Shake options (<200 harry, >25 grams/protein) :  Nectar, Pure Protein, Premier, Fairlife, Boost Max, [employer] Max, BeneProtein and [employer] (which is lactose-free) are milk-based options; Nectar, Premier Protein Clear, IsoPure Protein Drink, and Protein 2 O are water-based options; (Premier Protein Clear, the water-based option, comes in a 20 oz bottle with 20 grams of protein and 90 calories. So you have to drink three each day which increases the cost.)  (Disclaimer: Dietary supplements rarely have their listed ingredients and the amount of each verified by a third party other. Sometimes they give verification for their claims to be GMO and gluten free and to be organic.  However, even such verifications as these may still be untrustworthy.)     Fat snack options (<150 harry, >11 grams of fat): 22 almonds, 1 1/2 tablespoon of a oil-based dressing or 4 tablespoons of [place] dressing on a bed of salad greens, 1 1/2 tablespoons of peanut butter     Snack options (<100 harry, no sweets): fruit, low fat/high protein [place] yogurt, mozzarella cheese stick, nuts, salad with dressing, peanut butter, chips/crackers/pretzels     Frozen meal options (<350 harry):  Weight Watchers Smart Ones, [employer] Cuisine, Healthy Choice, Mary's, Holli's, etc     Take a multivitamin daily     Walk 30 min every day    Stop Fetzima    Take Effexor ER 75 mg + 150 mg  daily      Wenceslao Guerrier MD  Endocrinology/Obesity  12/20/21

## 2021-12-21 ENCOUNTER — TELEPHONE (OUTPATIENT)
Dept: PRIMARY CARE CLINIC | Age: 51
End: 2021-12-21

## 2022-01-08 PROBLEM — Z12.11 SCREEN FOR COLON CANCER: Status: RESOLVED | Noted: 2021-08-03 | Resolved: 2022-01-08

## 2022-01-20 DIAGNOSIS — Z12.11 SCREEN FOR COLON CANCER: ICD-10-CM

## 2022-02-01 DIAGNOSIS — E55.9 VITAMIN D DEFICIENCY: ICD-10-CM

## 2022-02-01 RX ORDER — MELATONIN
2000 DAILY
Qty: 60 TABLET | Refills: 5 | Status: SHIPPED
Start: 2022-02-01 | End: 2022-02-14

## 2022-02-14 ENCOUNTER — OFFICE VISIT (OUTPATIENT)
Dept: ENDOCRINOLOGY | Age: 52
End: 2022-02-14
Payer: MEDICARE

## 2022-02-14 VITALS
HEIGHT: 59 IN | HEART RATE: 80 BPM | BODY MASS INDEX: 41.12 KG/M2 | SYSTOLIC BLOOD PRESSURE: 114 MMHG | OXYGEN SATURATION: 97 % | DIASTOLIC BLOOD PRESSURE: 73 MMHG | WEIGHT: 204 LBS

## 2022-02-14 DIAGNOSIS — E66.01 MORBID OBESITY (HCC): ICD-10-CM

## 2022-02-14 DIAGNOSIS — E55.9 VITAMIN D DEFICIENCY: ICD-10-CM

## 2022-02-14 DIAGNOSIS — E03.9 HYPOTHYROIDISM, UNSPECIFIED TYPE: ICD-10-CM

## 2022-02-14 DIAGNOSIS — E03.9 HYPOTHYROIDISM, UNSPECIFIED TYPE: Primary | ICD-10-CM

## 2022-02-14 LAB
T4 FREE: 1.05 NG/DL (ref 0.93–1.7)
TSH SERPL DL<=0.05 MIU/L-ACNC: 2.1 UIU/ML (ref 0.27–4.2)

## 2022-02-14 PROCEDURE — 99214 OFFICE O/P EST MOD 30 MIN: CPT | Performed by: INTERNAL MEDICINE

## 2022-02-14 RX ORDER — LEVOTHYROXINE SODIUM 0.05 MG/1
50 TABLET ORAL DAILY
Qty: 90 TABLET | Refills: 3 | Status: SHIPPED | OUTPATIENT
Start: 2022-02-14

## 2022-02-14 NOTE — PROGRESS NOTES
[address] [place] [address] Department of Endocrinology Diabetes and Metabolism   [address] [facility] [address]   Phone: [phone]  Fax: [phone]    Date of Service: 2022  Primary Care Physician: Yue Monahan DO  Provider: Ivan Madera MD    Reason for the visit:  Hyperthyroidism    History of Present Illness: The history is provided by the patient. No  was used. Accuracy of the patient data is excellent. Harrison Hernandez is a very pleasant 46 y.o. female seen today for evaluation and management of hyperthyroidism     Harrison Hernandez was diagnosed with hyperthyroidism in 2020   Labs 2020 - free T3 4.8 (H), free T4 1.53, TPO-Ab negative, Tg-Ab negative, TSI <0.1     Thyroid uptake and scan 2020:   1.  Thyroid gland is not visualized during examination.      2.  24 hour radioiodine uptake of 0.7 percent (normal range: 10-30%).  This is abnormally low. Pt was initially treated with Methimazole for few months then switched to Levothyroxine.  She started Levothyroxine in 2021   Pt currently on Levothyroxine 50 mcg daily and doing very well on it   Due for labs now   Previous labs summarized below   Lab Results   Component Value Date/Time    TSH 2.350 2021 10:45 AM    T4FREE 0.93 2021 10:45 AM     PAST MEDICAL HISTORY   Past Medical History:   Diagnosis Date    Class 3 severe obesity due to excess calories without serious comorbidity with body mass index (BMI) of 40.0 to 44.9 in adult [facility])     Essential hypertension 2019    Gallstones     Hip arthritis     B/L BRAXTON    Hypothyroidism due to Hashimoto's thyroiditis     Mixed hyperlipidemia 2019    Non-rheumatic mitral regurgitation 2019       PAST SURGICAL HISTORY   Past Surgical History:   Procedure Laterality Date     SECTION      three times    CHOLECYSTECTOMY, LAPAROSCOPIC N/A 2021    LAPAROSCOPIC ROBOTIC ASSISTED CHOLECYSTECTOMY performed by Byron Victoria, DO at [address] [facility] [address]        SOCIAL HISTORY   Tobacco:   reports that she has quit smoking. She started smoking about 42 years ago. She has a 30.00 pack-year smoking history. She has never used smokeless tobacco.  Alcohol:   reports previous alcohol use. Drugs:   reports no history of drug use. FAMILY HISTORY   Family History   Problem Relation Age of Onset    Heart Disease Father        ALLERGIES AND DRUG REACTIONS   Allergies   Allergen Reactions    Bupropion Hives    Eggs Or Egg-Derived Products Nausea Only       CURRENT MEDICATIONS   Current Outpatient Medications   Medication Sig Dispense Refill    levothyroxine (SYNTHROID) 50 MCG tablet Take 1 tablet by mouth daily 30 tablet 11    vitamin D3 (CHOLECALCIFEROL) 25 MCG (1000 UT) TABS tablet Take 2 tablets by mouth daily 60 tablet 5    EFFEXOR  MG extended release capsule Take one tablet every day along with a 75 mg daily 90 capsule 1    venlafaxine (EFFEXOR XR) 75 MG extended release capsule Take 1 capsule by mouth daily along with a 150 mg tablet 90 capsule 1    lisinopril (PRINIVIL;ZESTRIL) 10 MG tablet Take 1 tablet by mouth daily 90 tablet 3    meloxicam (MOBIC) 7.5 MG tablet Take 1 tablet by mouth daily 90 tablet 3    atenolol (TENORMIN) 50 MG tablet Take 1 tablet by mouth daily 90 tablet 1    triamterene-hydroCHLOROthiazide (MAXZIDE-25) 37.5-25 MG per tablet Take 1 tablet by mouth daily 30 tablet 5    hydrOXYzine (VISTARIL) 25 MG capsule TAKE 1 CAPSULE 2 TIMES DAILY AS NEEDED      FETZIMA 120 MG CP24 extended release capsule 120 mg daily   0     No current facility-administered medications for this visit. Review of Systems  Constitutional: No fever, no chills, no diaphoresis, no generalized weakness. HEENT: No blurred vision, No sore throat, no ear pain, no hair loss  Neck: denied any neck swelling, difficulty swallowing,   Cadrdiopulomary: No CP, SOB or palpitation, No orthopnea or PND.  No cough or wheezing. GI: No N/V/D, no constipation, No abdominal pain, no melena or hematochezia   : Denied any dysuria, hematuria, flank pain, discharge, or incontinence. Skin: denied any rash, ulcer, Hirsute, or hyperpigmentation. MSK: denied any joint deformity, joint pain/swelling, muscle pain, or back pain. Neuro: no numbess, no tingling, no weakness,     OBJECTIVE    /73   Pulse 80   Ht 4' 11\" (1.499 m)   Wt 204 lb (92.5 kg)   SpO2 97%   BMI 41.20 kg/m²   BP Readings from Last 4 Encounters:   02/14/22 114/73   12/20/21 (!) 125/48   12/09/21 106/62   11/02/21 139/78     Wt Readings from Last 6 Encounters:   02/14/22 204 lb (92.5 kg)   12/20/21 196 lb 12.8 oz (89.3 kg)   12/09/21 200 lb (90.7 kg)   11/02/21 200 lb 6.4 oz (90.9 kg)   10/04/21 202 lb 9.6 oz (91.9 kg)   09/03/21 206 lb 6.4 oz (93.6 kg)     Physical examination:  General: awake alert, oriented x3  HEENT: normocephalic non traumatic, no exophthalmos   Neck: supple, thyroid tenderness,  Pulm: Clear equal air entry no added sounds  CVS: S1 + S2  Abd: soft lax, no tenderness  Skin: warm, no lesions, no rash.    Neuro: CN intact,   muscle power normal  Psych: normal mood, and affect      Review of Laboratory Data:  I have reviewed the following:  Lab Results   Component Value Date/Time    WBC 9.1 06/23/2020 08:39 AM    RBC 4.85 06/23/2020 08:39 AM    HGB 14.4 06/23/2020 08:39 AM    HCT 44.9 06/23/2020 08:39 AM    MCV 92.6 06/23/2020 08:39 AM    MCH 29.7 06/23/2020 08:39 AM    MCHC 32.1 06/23/2020 08:39 AM    RDW 15.7 (H) 06/23/2020 08:39 AM     06/23/2020 08:39 AM    MPV 10.8 06/23/2020 08:39 AM      Lab Results   Component Value Date/Time     08/20/2021 10:20 AM    K 4.8 08/20/2021 10:20 AM    CO2 28 08/20/2021 10:20 AM    BUN 21 (H) 08/20/2021 10:20 AM    CREATININE 0.6 08/20/2021 10:20 AM    CREATININE 0.5 08/20/2018 12:00 AM    CALCIUM 9.2 08/20/2021 10:20 AM    LABGLOM >60 08/20/2021 10:20 AM    GFRAA >60 08/20/2021 10:20 AM      Lab Results   Component Value Date/Time    TSH 2.660 08/16/2021 10:21 AM    T4FREE 0.93 08/16/2021 10:21 AM    Q6EAPTE 5.9 08/16/2021 10:21 AM    FT3 4.8 (H) 11/23/2020 01:05 PM    T7QZCED 116.10 01/04/2021 02:22 PM    TSI <0.10 12/17/2020 02:07 PM    TPOABS 2.2 11/23/2020 01:05 PM    THGAB <0.9 11/23/2020 01:05 PM     Lab Results   Component Value Date    LABA1C 5.6 08/03/2021    GLUCOSE 97 08/20/2021     Lab Results   Component Value Date    TRIG 127 12/13/2021    HDL 48 12/13/2021    LDLCALC 167 12/13/2021    CHOL 240 12/13/2021     Lab Results   Component Value Date    VITD25 24 04/10/2021    VITD25 24 01/04/2021       ASSESSMENT & RECOMMENDATIONS   Esequiel Arteaga, a 46 y.o.-old female seen in for management of following issues      Hypothyroidism   · Previous hyperthyroidism Likely due to transient thyroiditis    · Currently doing very well on Levothyroxine 50 mcg daily   · Check TFT and adjust the dose if needed     vitD deficiency    · continue vitd supplement     I personally reviewed external notes from PCP and other patient's care team providers, and personally interpreted labs associated with the above diagnosis. I also ordered labs to further assess and manage the above addressed medical conditions. Return in about 1 year (around 2/14/2023) for hypothyroidism, VitD deficiency. The above issues were reviewed with the patient who understood and agreed with the plan. Thank you for allowing us to participate in the care of this patient. Please do not hesitate to contact us with any additional questions. Diagnosis Orders   1. Hypothyroidism, unspecified type  TSH without Reflex    T4, Free    levothyroxine (SYNTHROID) 50 MCG tablet   2. Vitamin D deficiency     3.  Morbid obesity ([address]       Toby Nugent MD  Endocrinologist, [facility] and Endocrinology   [address] [facility] [address]   Phone: [phone]  Fax: [phone]  -------------------------  An electronic signature was used to authenticate this note. Ashley Bullard MD on 2/14/2022 at 10:42 AM     This Virtual  Visit was conducted, with patient's consent, to reduce the patient's risk of exposure to COVID-19 and provide continuity of care.

## 2022-02-17 ENCOUNTER — TELEPHONE (OUTPATIENT)
Dept: ENDOCRINOLOGY | Age: 52
End: 2022-02-17

## 2022-02-17 NOTE — PROGRESS NOTES
May consider stopping rosuvastatin for primary prevention Went over discharge instructions with patient and . Both verbalized understanding of instructions.  went to go and grab the script then out to get the car.

## 2022-03-10 ENCOUNTER — OFFICE VISIT (OUTPATIENT)
Dept: PRIMARY CARE CLINIC | Age: 52
End: 2022-03-10
Payer: MEDICARE

## 2022-03-10 VITALS
DIASTOLIC BLOOD PRESSURE: 72 MMHG | HEART RATE: 73 BPM | WEIGHT: 204 LBS | TEMPERATURE: 97.5 F | HEIGHT: 59 IN | OXYGEN SATURATION: 98 % | SYSTOLIC BLOOD PRESSURE: 138 MMHG | BODY MASS INDEX: 41.12 KG/M2

## 2022-03-10 DIAGNOSIS — Z00.00 ROUTINE GENERAL MEDICAL EXAMINATION AT A HEALTH CARE FACILITY: ICD-10-CM

## 2022-03-10 DIAGNOSIS — E03.2 HYPOTHYROIDISM DUE TO MEDICATION: ICD-10-CM

## 2022-03-10 DIAGNOSIS — E78.00 PURE HYPERCHOLESTEROLEMIA: ICD-10-CM

## 2022-03-10 DIAGNOSIS — R74.8 ELEVATED LIVER ENZYMES: ICD-10-CM

## 2022-03-10 DIAGNOSIS — I10 ESSENTIAL HYPERTENSION: ICD-10-CM

## 2022-03-10 DIAGNOSIS — Z00.00 MEDICARE ANNUAL WELLNESS VISIT, SUBSEQUENT: Primary | ICD-10-CM

## 2022-03-10 DIAGNOSIS — Z71.89 ACP (ADVANCE CARE PLANNING): ICD-10-CM

## 2022-03-10 DIAGNOSIS — I10 ESSENTIAL HYPERTENSION: Primary | ICD-10-CM

## 2022-03-10 DIAGNOSIS — E66.01 CLASS 3 SEVERE OBESITY DUE TO EXCESS CALORIES WITHOUT SERIOUS COMORBIDITY WITH BODY MASS INDEX (BMI) OF 40.0 TO 44.9 IN ADULT (HCC): ICD-10-CM

## 2022-03-10 DIAGNOSIS — Z23 NEED FOR PROPHYLACTIC VACCINATION WITH COMBINED DIPHTHERIA-TETANUS-PERTUSSIS (DTAP) VACCINE: ICD-10-CM

## 2022-03-10 PROBLEM — E66.813 CLASS 3 SEVERE OBESITY DUE TO EXCESS CALORIES WITHOUT SERIOUS COMORBIDITY IN ADULT: Status: ACTIVE | Noted: 2022-03-10

## 2022-03-10 LAB
ALBUMIN SERPL-MCNC: 4.6 G/DL (ref 3.5–5.2)
ALP BLD-CCNC: 87 U/L (ref 35–104)
ALT SERPL-CCNC: 29 U/L (ref 0–32)
ANION GAP SERPL CALCULATED.3IONS-SCNC: 13 MMOL/L (ref 7–16)
AST SERPL-CCNC: 41 U/L (ref 0–31)
BILIRUB SERPL-MCNC: 0.3 MG/DL (ref 0–1.2)
BUN BLDV-MCNC: 21 MG/DL (ref 6–20)
CALCIUM SERPL-MCNC: 9.8 MG/DL (ref 8.6–10.2)
CHLORIDE BLD-SCNC: 102 MMOL/L (ref 98–107)
CHOLESTEROL, TOTAL: 229 MG/DL (ref 0–199)
CO2: 25 MMOL/L (ref 22–29)
CREAT SERPL-MCNC: 0.7 MG/DL (ref 0.5–1)
GFR AFRICAN AMERICAN: >60
GFR NON-AFRICAN AMERICAN: >60 ML/MIN/1.73
GLUCOSE BLD-MCNC: 90 MG/DL (ref 74–99)
HDLC SERPL-MCNC: 47 MG/DL
LDL CHOLESTEROL CALCULATED: 154 MG/DL (ref 0–99)
POTASSIUM SERPL-SCNC: 4.6 MMOL/L (ref 3.5–5)
SODIUM BLD-SCNC: 140 MMOL/L (ref 132–146)
TOTAL PROTEIN: 7.7 G/DL (ref 6.4–8.3)
TRIGL SERPL-MCNC: 141 MG/DL (ref 0–149)
VLDLC SERPL CALC-MCNC: 28 MG/DL

## 2022-03-10 PROCEDURE — 99497 ADVNCD CARE PLAN 30 MIN: CPT | Performed by: INTERNAL MEDICINE

## 2022-03-10 PROCEDURE — 99213 OFFICE O/P EST LOW 20 MIN: CPT | Performed by: INTERNAL MEDICINE

## 2022-03-10 PROCEDURE — G0439 PPPS, SUBSEQ VISIT: HCPCS | Performed by: INTERNAL MEDICINE

## 2022-03-10 ASSESSMENT — PATIENT HEALTH QUESTIONNAIRE - PHQ9
2. FEELING DOWN, DEPRESSED OR HOPELESS: 1
SUM OF ALL RESPONSES TO PHQ QUESTIONS 1-9: 5
6. FEELING BAD ABOUT YOURSELF - OR THAT YOU ARE A FAILURE OR HAVE LET YOURSELF OR YOUR FAMILY DOWN: 0
7. TROUBLE CONCENTRATING ON THINGS, SUCH AS READING THE NEWSPAPER OR WATCHING TELEVISION: 0
SUM OF ALL RESPONSES TO PHQ QUESTIONS 1-9: 5
SUM OF ALL RESPONSES TO PHQ9 QUESTIONS 1 & 2: 2
5. POOR APPETITE OR OVEREATING: 0
SUM OF ALL RESPONSES TO PHQ QUESTIONS 1-9: 2
2. FEELING DOWN, DEPRESSED OR HOPELESS: 1
8. MOVING OR SPEAKING SO SLOWLY THAT OTHER PEOPLE COULD HAVE NOTICED. OR THE OPPOSITE, BEING SO FIGETY OR RESTLESS THAT YOU HAVE BEEN MOVING AROUND A LOT MORE THAN USUAL: 0
SUM OF ALL RESPONSES TO PHQ QUESTIONS 1-9: 3
SUM OF ALL RESPONSES TO PHQ9 QUESTIONS 1 & 2: 2
10. IF YOU CHECKED OFF ANY PROBLEMS, HOW DIFFICULT HAVE THESE PROBLEMS MADE IT FOR YOU TO DO YOUR WORK, TAKE CARE OF THINGS AT HOME, OR GET ALONG WITH OTHER PEOPLE: 1
9. THOUGHTS THAT YOU WOULD BE BETTER OFF DEAD, OR OF HURTING YOURSELF: 0
7. TROUBLE CONCENTRATING ON THINGS, SUCH AS READING THE NEWSPAPER OR WATCHING TELEVISION: 0
6. FEELING BAD ABOUT YOURSELF - OR THAT YOU ARE A FAILURE OR HAVE LET YOURSELF OR YOUR FAMILY DOWN: 1
1. LITTLE INTEREST OR PLEASURE IN DOING THINGS: 1
8. MOVING OR SPEAKING SO SLOWLY THAT OTHER PEOPLE COULD HAVE NOTICED. OR THE OPPOSITE, BEING SO FIGETY OR RESTLESS THAT YOU HAVE BEEN MOVING AROUND A LOT MORE THAN USUAL: 0
3. TROUBLE FALLING OR STAYING ASLEEP: 1
5. POOR APPETITE OR OVEREATING: 0
SUM OF ALL RESPONSES TO PHQ QUESTIONS 1-9: 3
1. LITTLE INTEREST OR PLEASURE IN DOING THINGS: 1
SUM OF ALL RESPONSES TO PHQ QUESTIONS 1-9: 5
SUM OF ALL RESPONSES TO PHQ QUESTIONS 1-9: 5
3. TROUBLE FALLING OR STAYING ASLEEP: 1
9. THOUGHTS THAT YOU WOULD BE BETTER OFF DEAD, OR OF HURTING YOURSELF: 0
SUM OF ALL RESPONSES TO PHQ QUESTIONS 1-9: 2
2. FEELING DOWN, DEPRESSED OR HOPELESS: 1
4. FEELING TIRED OR HAVING LITTLE ENERGY: 1
SUM OF ALL RESPONSES TO PHQ QUESTIONS 1-9: 2
SUM OF ALL RESPONSES TO PHQ QUESTIONS 1-9: 3
4. FEELING TIRED OR HAVING LITTLE ENERGY: 1
SUM OF ALL RESPONSES TO PHQ QUESTIONS 1-9: 3
SUM OF ALL RESPONSES TO PHQ QUESTIONS 1-9: 2

## 2022-03-10 ASSESSMENT — ENCOUNTER SYMPTOMS
WHEEZING: 0
COLOR CHANGE: 0
COUGH: 0
TROUBLE SWALLOWING: 0
EYE PAIN: 0
PHOTOPHOBIA: 0
ANAL BLEEDING: 0
SORE THROAT: 0
CONSTIPATION: 0
SHORTNESS OF BREATH: 0
BLOOD IN STOOL: 0
EYE ITCHING: 0
DIARRHEA: 0
NAUSEA: 0
EYE DISCHARGE: 0
FACIAL SWELLING: 0
ABDOMINAL PAIN: 0
VOMITING: 0
STRIDOR: 0
RHINORRHEA: 0

## 2022-03-10 ASSESSMENT — LIFESTYLE VARIABLES: HOW OFTEN DO YOU HAVE A DRINK CONTAINING ALCOHOL: NEVER

## 2022-03-10 NOTE — PROGRESS NOTES
Advance Care Planning   Advanced Care Planning: Discussed the patients choices for care and treatment in case of a health event that adversely affects decision-making abilities. Also discussed the patients long-term treatment options. Reviewed with the patient the appropriate state-specific advance directive documents. Reviewed the process of designating a competent adult as an Agent (or -in-fact) that could take make health care decisions for the patient if incompetent. Patient was asked to complete the declaration forms, either acknowledge the forms by a public notary or an eligible witness and provide a signed copy to the practice office. Time spent (minutes): 4    Obesity Counseling: Assessed behavioral health risks and factors affecting choice of behavior. Suggested weight control approaches, including dietary changes behavioral modification and follow up plan. Provided educational and support documentation. Time spent (minutes): 5  Cardiovascular Disease Risk Counseling: Assessed the patient's risk to develop cardiovascular disease and reviewed main risk factors. Reviewed steps to reduce disease risk including:   · Quitting tobacco use, reducing amount smoked, or not starting the habit  · Making healthy food choices  · Being physically active and gradualy increasing activity levels   · Reduce weight and determine a healthy BMI goal  · Monitor blood pressure and treat if higher than 140/90 mmHg  · Maintain blood total cholesterol levels under 5 mmol/l or 190 mg/dl  · Maintain LDL cholesterol levels under 3.0 mmol/l or 115 mg/dl   · Control blood glucose levels  · Consider taking aspirin (75 mg daily), once blood pressure is controlled   Provided a follow up plan.   Time spent (minutes): 5  Medicare Annual Wellness Visit    Marco A Persaud is here for Medicare AWV    Assessment & Plan   Medicare annual wellness visit, subsequent  ACP (advance care planning)  -     [address], 1ST 30MIN [[id number]]  Routine general medical examination at a health care facility      Recommendations for Preventive Services Due: see orders and patient instructions/AVS.  Recommended screening schedule for the next 5-10 years is provided to the patient in written form: see Patient Instructions/AVS.        Follow-up in 1 year for annual wellness visit       Subjective       Patient's complete Health Risk Assessment and screening values have been reviewed and are found in Flowsheets. The following problems were reviewed today and where indicated follow up appointments were made and/or referrals ordered.     Positive Risk Factor Screenings with Interventions:               General Health and ACP:  General  In general, how would you say your health is?: Very Good  In the past 7 days, have you experienced any of the following: New or Increased Pain, New or Increased Fatigue, Loneliness, Social Isolation, Stress or Anger?: (!) Yes  Select all that apply: (!) New or Increased Pain (achy shoulders)  Do you get the social and emotional support that you need?: Yes  Do you have a Living Will?: (!) No    Advance Directives     Power of  Living Will ACP-Advance Directive ACP-Power of     Not on File Not on File Not on File Not on File      General Health Risk Interventions:  · No Living Will: Advance Care Planning addressed with patient today    Health Habits/Nutrition:     Physical Activity: Sufficiently Active    Days of Exercise per Week: 3 days    Minutes of Exercise per Session: 60 min     Have you lost any weight without trying in the past 3 months?: No     Have you seen the dentist within the past year?: Yes    Health Habits/Nutrition Interventions:  · continue with exercise and diet    Hearing/Vision:  Do you or your family notice any trouble with your hearing that hasn't been managed with hearing aids?: No  Do you have difficulty driving, watching TV, or doing any of your daily activities because of your eyesight?: No  Have you had an eye exam within the past year?: (!) No  No exam data present    Hearing/Vision Interventions:  · Vision concerns:  patient encouraged to make appointment with his/her eye specialist            Objective   There were no vitals filed for this visit. There is no height or weight on file to calculate BMI. Allergies   Allergen Reactions    Bupropion Hives    Eggs Or Egg-Derived Products Nausea Only     Prior to Visit Medications    Medication Sig Taking?  Authorizing Provider   levothyroxine (SYNTHROID) 50 MCG tablet Take 1 tablet by mouth daily Yes Ajit Mccracken MD   vitamin D-3 (CHOLECALCIFEROL) 125 MCG (5000 UT) TABS Take 1 tablet by mouth daily Yes Ajit Mccracken MD   EFFEXOR  MG extended release capsule Take one tablet every day along with a 75 mg daily Yes Ruben Martins MD   venlafaxine (EFFEXOR XR) 75 MG extended release capsule Take 1 capsule by mouth daily along with a 150 mg tablet Yes Ruben Martins MD   lisinopril (PRINIVIL;ZESTRIL) 10 MG tablet Take 1 tablet by mouth daily Yes Chayo Hart DO   meloxicam (MOBIC) 7.5 MG tablet Take 1 tablet by mouth daily Yes Chayo Hart DO   atenolol (TENORMIN) 50 MG tablet Take 1 tablet by mouth daily Yes Chayo Hart DO   triamterene-hydroCHLOROthiazide (MAXZIDE-25) 37.5-25 MG per tablet Take 1 tablet by mouth daily Yes Chayo Hart DO   Tetanus-Diphth-Acell Pertussis (BOOSTRIX) 5-2.5-18.5 LF-MCG/0.5 injection Inject 0.5 mLs into the muscle once for 1 dose  Chayo Hart DO   lisinopril (PRINIVIL;ZESTRIL) 10 MG tablet Take 1 tablet by mouth daily  Chayo Hart DO       CareTeam (Including outside providers/suppliers regularly involved in providing care):   Patient Care Team:  Stu Guido DO as PCP - General (Internal Medicine)  Stu Guido DO as PCP - [facility] Empaneled Provider

## 2022-03-10 NOTE — ASSESSMENT & PLAN NOTE
continue trying to lose weight.   Consideration toward Plenity or semaglutide to help in weight loss

## 2022-03-10 NOTE — ASSESSMENT & PLAN NOTE
Blood pressures are stable. Continue medications and monitor blood pressures at home.  Call office if systolics are over 798 over diastolics over 90.  check fasting CMP

## 2022-03-10 NOTE — PROGRESS NOTES
3/10/2022    Name: Edmond Griffin : 1970 Sex: female  Age: 46 y.o. Subjective:  Chief Complaint   Patient presents with    Hypertension        Hypertension  Pertinent negatives include no chest pain, headaches, palpitations or shortness of breath. Other  Pertinent negatives include no abdominal pain, arthralgias, chest pain, congestion, coughing, diaphoresis, fatigue, headaches, joint swelling, myalgias, nausea, numbness, rash, sore throat, vomiting or weakness. Patient has a history of hyperthyroidism due to Hashimoto's thyroiditis. She was treated with methimazole and became hypothyroid. She is now on replacement levothyroxine 50 mcg daily. Her endocrinologist is Dr. Elicia Swann. Her last TSH and free T4 were within normal limits. She has had a hard time losing weight, she eats clean, exercise cysts 5 or 6 times a week both weight lifting and using the elliptical . She was also sent to Dr. Thong Callahan by Dr. Elicia Swann for weight loss. He has been working with her but she has not lost any weight. .  She asks about medication such as Plenity. . I told her would look into it and make sure that it has no bad side effects. She does have some liver dysfunction so we cannot medication that could affect her liver. Another medication that we could try would be injectable semaglutide which is associated with some weight loss. Gregory Joseph Her heart rate was rapid so we discontinued her diltiazem and started her on atenolol 50 mg. She says her heart rate is better . Her endocrinologist increased her vitamin D supplement to 5000 units daily as her vitamin D level was low. .    She had her Pap test and mammogram done by gynecologist at [address] [place] [address], [id number] for [place] in [place]. We will get results. ALT and AST were slightly elevated in May 2021. Her fasting blood sugar was borderline at 100. We will recheck    Her total cholesterol was 240 with an LDL of 167.   If these are still elevated we will give her a trial of vasectomy but she is not a candidate for statin because of her liver enzyme elevations. .    Patient has history of hypertension, nonrheumatic mitral valve regurgitation, depression, hyperlipidemia and arthritis. Vitamin D level was low at 24. Her vitamin D was increased to 2000 units daily. .  We will need to recheck vitamin D level on her next office visit. She sees psychiatry for depression. They discontinued her Fetzima because it could cause weight gain. They started her on Effexor  mg +75 mg once a day. Hopefully this will help decrease her appetite as well. I reviewed her report from cardiologist who felt that if she needed a low dose hormone replacement that she could take it. He ordered an echocardiogram which was done at [facility]. We will get a copy of the report. She quit smoking this year. She does not know when her last menstrual period was because she has an IUD    She finished up her COVID-19 series including a booster shot. She had her quadrivalent flu shot    She had problems with hip pain, this was the hip that was replaced. She saw Dr. Santa Bui who felt that nothing could be done at this time, just weight loss and conservative therapy. In March 2021 she fell and injured her left shoulder. She was seen by Dr. Olga Ross who felt she had a left biceps rupture and rotator cuff dysfunction. She was given a prescription for physical therapy. She took meloxicam  and she feels much better. Review of Systems   Constitutional: Negative for appetite change, diaphoresis, fatigue and unexpected weight change. HENT: Negative for congestion, ear pain, facial swelling, rhinorrhea, sore throat, tinnitus and trouble swallowing. Eyes: Negative for photophobia, pain, discharge, itching and visual disturbance. Respiratory: Negative for cough, shortness of breath, wheezing and stridor. Cardiovascular: Negative for chest pain, palpitations and leg swelling.    Gastrointestinal: Negative for abdominal pain, anal bleeding, blood in stool, constipation, diarrhea, nausea and vomiting. Endocrine: Negative for cold intolerance, heat intolerance, polydipsia, polyphagia and polyuria. Genitourinary: Negative for difficulty urinating, dysuria, flank pain, frequency, hematuria and urgency. Hot flashes   Musculoskeletal: Negative for arthralgias, gait problem, joint swelling and myalgias. Skin: Negative for color change, pallor and rash. Allergic/Immunologic: Negative for environmental allergies and food allergies. Neurological: Negative for dizziness, tremors, seizures, syncope, speech difficulty, weakness, light-headedness, numbness and headaches. Hematological: Negative for adenopathy. Does not bruise/bleed easily. Psychiatric/Behavioral: Negative for agitation, behavioral problems, confusion, sleep disturbance and suicidal ideas. The patient is nervous/anxious.            Current Outpatient Medications:     Tetanus-Diphth-Acell Pertussis (BOOSTRIX) 5-2.5-18.5 LF-MCG/0.5 injection, Inject 0.5 mLs into the muscle once for 1 dose, Disp: 1 each, Rfl: 0    levothyroxine (SYNTHROID) 50 MCG tablet, Take 1 tablet by mouth daily, Disp: 90 tablet, Rfl: 3    vitamin D-3 (CHOLECALCIFEROL) 125 MCG (5000 UT) TABS, Take 1 tablet by mouth daily, Disp: 100 tablet, Rfl: 3    EFFEXOR  MG extended release capsule, Take one tablet every day along with a 75 mg daily, Disp: 90 capsule, Rfl: 1    venlafaxine (EFFEXOR XR) 75 MG extended release capsule, Take 1 capsule by mouth daily along with a 150 mg tablet, Disp: 90 capsule, Rfl: 1    lisinopril (PRINIVIL;ZESTRIL) 10 MG tablet, Take 1 tablet by mouth daily, Disp: 90 tablet, Rfl: 3    meloxicam (MOBIC) 7.5 MG tablet, Take 1 tablet by mouth daily, Disp: 90 tablet, Rfl: 3    atenolol (TENORMIN) 50 MG tablet, Take 1 tablet by mouth daily, Disp: 90 tablet, Rfl: 1    triamterene-hydroCHLOROthiazide (MAXZIDE-25) 37.5-25 MG per tablet, Take 1 tablet by mouth daily, Disp: 30 tablet, Rfl: 5     Allergies   Allergen Reactions    Bupropion Hives    Eggs Or Egg-Derived Products Nausea Only        Past Medical History:   Diagnosis Date    Class 3 severe obesity due to excess calories without serious comorbidity with body mass index (BMI) of 40.0 to 44.9 in adult [facility])     Essential hypertension 2019    Gallstones     Hip arthritis     B/L BRAXTON    Hypothyroidism due to Hashimoto's thyroiditis     Mixed hyperlipidemia 2019    Non-rheumatic mitral regurgitation 2019       Health Maintenance Due   Topic Date Due    Hepatitis C screen  Never done    Depression Monitoring  Never done    HIV screen  Never done    DTaP/Tdap/Td vaccine (1 - Tdap) Never done    Cervical cancer screen  Never done    Low dose CT lung screening  Never done    Shingles Vaccine (2 of 2) 2021    Annual Wellness Visit (AWV)  2021        Patient Active Problem List   Diagnosis    Non-rheumatic mitral regurgitation    Mixed hyperlipidemia    Essential hypertension    Depression    Obesity due to excess calories without serious comorbidity    Anxiety    Arthralgia of knee    Vitamin D deficiency    Hypothyroidism due to medication    Hyperglycemia    Weight gain    Elevated liver enzymes    Symptomatic cholelithiasis    Hypothyroidism due to Hashimoto's thyroiditis    Pure hypercholesterolemia    Need for prophylactic vaccination with combined diphtheria-tetanus-pertussis (DTaP) vaccine    Medicare annual wellness visit, subsequent    ACP (advance care planning)    Routine general medical examination at a health care facility    Class 3 severe obesity due to excess calories without serious comorbidity in [facility])        Past Surgical History:   Procedure Laterality Date     SECTION      three times    CHOLECYSTECTOMY, LAPAROSCOPIC N/A 2021    LAPAROSCOPIC ROBOTIC ASSISTED CHOLECYSTECTOMY performed by Hernán Sams Hernán Spencer DO at [facility] OR    HIP ARTHROPLASTY Left         Family History   Problem Relation Age of Onset    Heart Disease Father         Social History     Tobacco Use    Smoking status: Former Smoker     Packs/day: 1.00     Years: 30.00     Pack years: 30.00     Start date: 36     Quit date:      Years since quittin.1    Smokeless tobacco: Never Used   Vaping Use    Vaping Use: Never used   Substance Use Topics    Alcohol use: Not Currently    Drug use: Never        Objective  Vitals:    03/10/22 1100   BP: 138/72   Pulse: 73   Temp: 97.5 °F (36.4 °C)   SpO2: 98%   Weight: 204 lb (92.5 kg)   Height: 4' 11\" (1.499 m)        Exam:  Physical Exam  Vitals reviewed. Constitutional:       General: She is not in acute distress. Appearance: She is well-developed. She is obese. She is not ill-appearing. HENT:      Head: Normocephalic. Right Ear: External ear normal.      Left Ear: External ear normal.   Eyes:      General: No scleral icterus. Right eye: No discharge. Left eye: No discharge. Extraocular Movements: Extraocular movements intact. Conjunctiva/sclera: Conjunctivae normal.      Pupils: Pupils are equal, round, and reactive to light. Neck:      Thyroid: No thyromegaly. Cardiovascular:      Rate and Rhythm: Normal rate and regular rhythm. Heart sounds: Normal heart sounds. No murmur heard. No friction rub. No gallop. Pulmonary:      Effort: Pulmonary effort is normal. No respiratory distress. Breath sounds: Normal breath sounds. No wheezing or rales. Chest:      Chest wall: No tenderness. Abdominal:      General: Bowel sounds are normal. There is no distension. Palpations: Abdomen is soft. There is no mass. Tenderness: There is no abdominal tenderness. There is no guarding or rebound. Musculoskeletal:         General: No tenderness or deformity. Normal range of motion. Cervical back: Normal range of motion and neck supple. Lymphadenopathy:      Cervical: No cervical adenopathy. Skin:     General: Skin is warm and dry. Coloration: Skin is not pale. Findings: No erythema or rash. Neurological:      Mental Status: She is alert and oriented to person, place, and time. Cranial Nerves: No cranial nerve deficit. Sensory: No sensory deficit. Deep Tendon Reflexes: Reflexes normal.   Psychiatric:         Mood and Affect: Mood normal.         Behavior: Behavior normal.         Thought Content: Thought content normal.         Judgment: Judgment normal.          Last labs reviewed. ASSESSMENT & PLAN :   Problem List        Circulatory    Essential hypertension - Primary     Blood pressures are stable. Continue medications and monitor blood pressures at home.  Call office if systolics are over 326 over diastolics over 90.  check fasting CMP         Relevant Medications    triamterene-hydroCHLOROthiazide (MAXZIDE-25) 37.5-25 MG per tablet    lisinopril (PRINIVIL;ZESTRIL) 10 MG tablet    Other Relevant Orders    Comprehensive Metabolic Panel       Endocrine    Hypothyroidism due to medication       continue levothyroxine and follow-up with endocrinology         Relevant Medications    levothyroxine (SYNTHROID) 50 MCG tablet       Other    Elevated liver enzymes       check CMP and if still elevated will do an ultrasound of her liver with elastography         Pure hypercholesterolemia     Watch saturated fats in diet and will monitor lipids  if still markedly elevated would recommend mended trial of ezetimibe as statins may increase her liver enzymes         Relevant Medications    triamterene-hydroCHLOROthiazide (MAXZIDE-25) 37.5-25 MG per tablet    atenolol (TENORMIN) 50 MG tablet    lisinopril (PRINIVIL;ZESTRIL) 10 MG tablet    Other Relevant Orders    Comprehensive Metabolic Panel    Lipid Panel    Need for prophylactic vaccination with combined diphtheria-tetanus-pertussis (DTaP) vaccine       Tdap requisition sent to her pharmacy         Relevant Medications    Tetanus-Diphth-Acell Pertussis (BOOSTRIX) 5-2.5-18.5 LF-MCG/0.5 injection    Class 3 severe obesity due to excess calories without serious comorbidity in [facility])       continue trying to lose weight. Consideration toward Plenity or semaglutide to help in weight loss         Relevant Medications    EFFEXOR  MG extended release capsule    venlafaxine (EFFEXOR XR) 75 MG extended release capsule           Return in about 3 months (around 6/10/2022), or HTN HL.        Anila Loeng, DO  3/10/2022

## 2022-03-10 NOTE — ASSESSMENT & PLAN NOTE
Watch saturated fats in diet and will monitor lipids  if still markedly elevated would recommend mended trial of ezetimibe as statins may increase her liver enzymes

## 2022-03-10 NOTE — PATIENT INSTRUCTIONS
Advance Directives: Care Instructions  Overview  An advance directive is a legal way to state your wishes at the end of your life. It tells your family and your doctor what to do if you can't say what you want. There are two main types of advance directives. You can change them any time your wishes change. Living will. This form tells your family and your doctor your wishes about life support and other treatment. The form is also called a declaration. Medical power of . This form lets you name a person to make treatment decisions for you when you can't speak for yourself. This person is called a health care agent (health care proxy, health care surrogate). The form is also called a durable power of  for health care. If you do not have an advance directive, decisions about your medical care may be made by a family member, or by a doctor or a  who doesn't know you. It may help to think of an advance directive as a gift to the people who care for you. If you have one, they won't have to make tough decisions by themselves. Follow-up care is a key part of your treatment and safety. Be sure to make and go to all appointments, and call your doctor if you are having problems. It's also a good idea to know your test results and keep a list of the medicines you take. What should you include in an advance directive? Many states have a unique advance directive form. (It may ask you to address specific issues.) Or you might use a universal form that's approved by many states. If your form doesn't tell you what to address, it may be hard to know what to include in your advance directive. Use the questions below to help you get started. · Who do you want to make decisions about your medical care if you are not able to? · What life-support measures do you want if you have a serious illness that gets worse over time or can't be cured? · What are you most afraid of that might happen?  (Maybe you're afraid of having pain, losing your independence, or being kept alive by machines.)  · Where would you prefer to die? (Your home? A hospital? A nursing home?)  · Do you want to donate your organs when you die? · Do you want certain Adventism practices performed before you die? When should you call for help? Be sure to contact your doctor if you have any questions. Where can you learn more? Go to [email or web address] and sign in to your [employer] account. Enter R264 in the [employer] box to learn more about \"Advance Directives: Care Instructions. \"     If you do not have an account, please click on the \"Sign Up Now\" link. Current as of: March 17, 2021               Content Version: 13.1  © 4446-4509 Healthwise, [employer] Care instructions adapted under license by [facility] ([facility]). If you have questions about a medical condition or this instruction, always ask your healthcare professional. [place] [address] any warranty or liability for your use of this information. Learning About Living Lucero Lin  What is a living will? A living will, also called a declaration, is a legal form. It tells your family and your doctor your wishes when you can't speak for yourself. It's used by the health professionals who will treat you as you near the end of your life or if you get seriously hurt or ill. If you put your wishes in writing, your loved ones and others will know what kind of care you want. They won't need to guess. This can ease your mind and be helpful to others. And you can change or cancel your living will at any time. A living will is not the same as an estate or property will. An estate will explains what you want to happen with your money and property after you die. How do you use it? A living will is used to describe the kinds of treatment or life support you want as you near the end of your life or if you get seriously hurt or ill.  Keep these facts in mind about living topete. · Your living will is used only if you can't speak or make decisions for yourself. Most often, one or more doctors must certify that you can't speak or decide for yourself before your living will takes effect. · If you get better and can speak for yourself again, you can accept or refuse any treatment. It doesn't matter what you said in your living will. · Some states may limit your right to refuse treatment in certain cases. For example, you may need to clearly state in your living will that you don't want artificial hydration and nutrition, such as being fed through a tube. Is a living will a legal document? A living will is a legal document. Each state has its own laws about living topete. And a living will may be called something else in your state. Here are some things to know about living topete. · You don't need an  to complete a living will. But legal advice can be helpful if your state's laws are unclear. It can also help if your health history is complicated or your family can't agree on what should be in your living will. · You can change your living will at any time. Some people find that their wishes about end-of-life care change as their health changes. If you make big changes to your living will, complete a new form. · If you move to another state, make sure that your living will is legal in the state where you now live. In most cases, doctors will respect your wishes even if you have a form from a different state. · You might use a universal form that has been approved by many states. This kind of form can sometimes be filled out and stored online. Your digital copy will then be available wherever you have a connection to the internet. The doctors and nurses who need to treat you can find it right away. · Your state may offer an online registry. This is another place where you can store your living will online.   · It's a good idea to get your living will notarized. This means using a person called a Amulyte to watch two people sign, or witness, your living will. What should you know when you create a living will? Here are some questions to ask yourself as you make your living will:  · Do you know enough about life support methods that might be used? If not, talk to your doctor so you know what might be done if you can't breathe on your own, your heart stops, or you can't swallow. · What things would you still want to be able to do after you receive life-support methods? Would you want to be able to walk? To speak? To eat on your own? To live without the help of machines? · Do you want certain Restorationist practices performed if you become very ill? · If you have a choice, where do you want to be cared for? In your home? At a hospital or nursing home? · If you have a choice at the end of your life, where would you prefer to die? At home? In a hospital or nursing home? Somewhere else? · Would you prefer to be buried or cremated? · Do you want your organs to be donated after you die? What should you do with your living will? · Make sure that your family members and your health care agent have copies of your living will (also called a declaration). · Give your doctor a copy of your living will. Ask him or her to keep it as part of your medical record. If you have more than one doctor, make sure that each one has a copy. · Put a copy of your living will where it can be easily found. For example, some people may put a copy on their refrigerator door. If you are using a digital copy, be sure your doctor, family members, and health care agent know how to find and access it. Where can you learn more? Go to https://chpeeva.[email or web address] org and sign in to your Panelfly account. Enter H297 in the Skinfix box to learn more about \"Learning About Living Lucero Lin. \"     If you do not have an account, please click on the \"Sign Up Now\" link.  Current as of: March 17, 2021               Content Version: 13.1  © 3875-3238 Healthwise, [employer]. Care instructions adapted under license by [facility] ([facility]). If you have questions about a medical condition or this instruction, always ask your healthcare professional. [address] any warranty or liability for your use of this information. Personalized Preventive Plan for Lexi Iqbal - 3/10/2022  Medicare offers a range of preventive health benefits. Some of the tests and screenings are paid in full while other may be subject to a deductible, co-insurance, and/or copay. Some of these benefits include a comprehensive review of your medical history including lifestyle, illnesses that may run in your family, and various assessments and screenings as appropriate. After reviewing your medical record and screening and assessments performed today your provider may have ordered immunizations, labs, imaging, and/or referrals for you. A list of these orders (if applicable) as well as your Preventive Care list are included within your After Visit Summary for your review. Other Preventive Recommendations:    · A preventive eye exam performed by an eye specialist is recommended every 1-2 years to screen for glaucoma; cataracts, macular degeneration, and other eye disorders. · A preventive dental visit is recommended every 6 months. · Try to get at least 150 minutes of exercise per week or 10,000 steps per day on a pedometer . · Order or download the FREE \"Exercise & Physical Activity: Your Everyday Guide\" from [employer]. Call 8-925.376.9327 or search [employer] online. · You need 2231-7888 mg of calcium and 9531-7380 IU of vitamin D per day.  It is possible to meet your calcium requirement with diet alone, but a vitamin D supplement is usually necessary to meet this goal.  · When exposed to the sun, use a sunscreen that protects against both UVA and UVB radiation with an SPF of 30 or greater. Reapply every 2 to 3 hours or after sweating, drying off with a towel, or swimming. · Always wear a seat belt when traveling in a car. Always wear a helmet when riding a bicycle or motorcycle. Personalized Preventive Plan for Royce Gitelman - 3/10/2022  Medicare offers a range of preventive health benefits. Some of the tests and screenings are paid in full while other may be subject to a deductible, co-insurance, and/or copay. Some of these benefits include a comprehensive review of your medical history including lifestyle, illnesses that may run in your family, and various assessments and screenings as appropriate. After reviewing your medical record and screening and assessments performed today your provider may have ordered immunizations, labs, imaging, and/or referrals for you. A list of these orders (if applicable) as well as your Preventive Care list are included within your After Visit Summary for your review. Other Preventive Recommendations:    A preventive eye exam performed by an eye specialist is recommended every 1-2 years to screen for glaucoma; cataracts, macular degeneration, and other eye disorders. A preventive dental visit is recommended every 6 months. Try to get at least 150 minutes of exercise per week or 10,000 steps per day on a pedometer . Order or download the FREE \"Exercise & Physical Activity: Your Everyday Guide\" from [employer]. Call 4-284.107.3345 or search [employer] online. You need 3012-6442 mg of calcium and 9655-5689 IU of vitamin D per day. It is possible to meet your calcium requirement with diet alone, but a vitamin D supplement is usually necessary to meet this goal.  When exposed to the sun, use a sunscreen that protects against both UVA and UVB radiation with an SPF of 30 or greater.  Reapply every 2 to 3 hours or after sweating, drying off with a towel, or swimming. Always wear a seat belt when traveling in a car. Always wear a helmet when riding a bicycle or motorcycle.

## 2022-03-30 DIAGNOSIS — E05.90 HYPERTHYROIDISM: ICD-10-CM

## 2022-03-30 RX ORDER — ATENOLOL 50 MG/1
TABLET ORAL
Qty: 90 TABLET | Refills: 1 | Status: SHIPPED
Start: 2022-03-30 | End: 2022-09-12 | Stop reason: SDUPTHER

## 2022-03-30 NOTE — TELEPHONE ENCOUNTER
Last Appointment:  3/10/2022  Future Appointments   Date Time Provider Scott Ashley   6/13/2022 10:30 AM [address], DO [facility]   2/14/2023 11:00 AM Sumeet Reynolds MD [facility]   3/13/2023 11:30 AM [facility], DO [facility]

## 2022-04-09 PROBLEM — Z00.00 ROUTINE GENERAL MEDICAL EXAMINATION AT A HEALTH CARE FACILITY: Status: RESOLVED | Noted: 2022-03-10 | Resolved: 2022-04-09

## 2022-04-09 PROBLEM — Z00.00 MEDICARE ANNUAL WELLNESS VISIT, SUBSEQUENT: Status: RESOLVED | Noted: 2022-03-10 | Resolved: 2022-04-09

## 2022-05-11 DIAGNOSIS — I10 ESSENTIAL HYPERTENSION: ICD-10-CM

## 2022-05-11 RX ORDER — TRIAMTERENE AND HYDROCHLOROTHIAZIDE 37.5; 25 MG/1; MG/1
TABLET ORAL
Qty: 30 TABLET | Refills: 5 | Status: SHIPPED
Start: 2022-05-11 | End: 2022-09-12 | Stop reason: SDUPTHER

## 2022-05-11 NOTE — TELEPHONE ENCOUNTER
Last Appointment:  3/10/2022  Future Appointments   Date Time Provider Scott Ashley   6/13/2022 10:30 AM [address], DO [place] [facility]   2/14/2023 11:00 AM Dennie Star, MD [facility]   3/13/2023 11:30 AM [address], DO [place] [facility]

## 2022-06-01 DIAGNOSIS — I10 ESSENTIAL HYPERTENSION: ICD-10-CM

## 2022-06-01 RX ORDER — LISINOPRIL 10 MG/1
10 TABLET ORAL DAILY
Qty: 100 TABLET | Refills: 3 | Status: SHIPPED
Start: 2022-06-01 | End: 2022-06-01 | Stop reason: SDUPTHER

## 2022-06-01 RX ORDER — LISINOPRIL 10 MG/1
10 TABLET ORAL DAILY
Qty: 100 TABLET | Refills: 3 | Status: SHIPPED | OUTPATIENT
Start: 2022-06-01 | End: 2022-09-12

## 2022-06-10 DIAGNOSIS — E66.01 CLASS 3 SEVERE OBESITY DUE TO EXCESS CALORIES WITHOUT SERIOUS COMORBIDITY WITH BODY MASS INDEX (BMI) OF 40.0 TO 44.9 IN ADULT (HCC): ICD-10-CM

## 2022-06-13 ENCOUNTER — OFFICE VISIT (OUTPATIENT)
Dept: PRIMARY CARE CLINIC | Age: 52
End: 2022-06-13
Payer: MEDICARE

## 2022-06-13 VITALS
WEIGHT: 206 LBS | SYSTOLIC BLOOD PRESSURE: 120 MMHG | HEART RATE: 66 BPM | OXYGEN SATURATION: 96 % | BODY MASS INDEX: 41.53 KG/M2 | TEMPERATURE: 96.6 F | HEIGHT: 59 IN | DIASTOLIC BLOOD PRESSURE: 74 MMHG

## 2022-06-13 DIAGNOSIS — I10 ESSENTIAL HYPERTENSION: ICD-10-CM

## 2022-06-13 DIAGNOSIS — E06.3 HYPOTHYROIDISM DUE TO HASHIMOTO'S THYROIDITIS: ICD-10-CM

## 2022-06-13 DIAGNOSIS — I34.0 NON-RHEUMATIC MITRAL REGURGITATION: ICD-10-CM

## 2022-06-13 DIAGNOSIS — E03.8 HYPOTHYROIDISM DUE TO HASHIMOTO'S THYROIDITIS: Primary | ICD-10-CM

## 2022-06-13 DIAGNOSIS — E03.8 HYPOTHYROIDISM DUE TO HASHIMOTO'S THYROIDITIS: ICD-10-CM

## 2022-06-13 DIAGNOSIS — F32.89 OTHER DEPRESSION: ICD-10-CM

## 2022-06-13 DIAGNOSIS — R74.8 ELEVATED LIVER ENZYMES: ICD-10-CM

## 2022-06-13 DIAGNOSIS — E78.00 PURE HYPERCHOLESTEROLEMIA: ICD-10-CM

## 2022-06-13 DIAGNOSIS — E06.3 HYPOTHYROIDISM DUE TO HASHIMOTO'S THYROIDITIS: Primary | ICD-10-CM

## 2022-06-13 DIAGNOSIS — E03.2 HYPOTHYROIDISM DUE TO MEDICATION: ICD-10-CM

## 2022-06-13 LAB
ALBUMIN SERPL-MCNC: 4.4 G/DL (ref 3.5–5.2)
ALP BLD-CCNC: 74 U/L (ref 35–104)
ALT SERPL-CCNC: 28 U/L (ref 0–32)
ANION GAP SERPL CALCULATED.3IONS-SCNC: 15 MMOL/L (ref 7–16)
AST SERPL-CCNC: 42 U/L (ref 0–31)
BILIRUB SERPL-MCNC: 0.5 MG/DL (ref 0–1.2)
BUN BLDV-MCNC: 17 MG/DL (ref 6–20)
CALCIUM SERPL-MCNC: 9.3 MG/DL (ref 8.6–10.2)
CHLORIDE BLD-SCNC: 103 MMOL/L (ref 98–107)
CHOLESTEROL, TOTAL: 231 MG/DL (ref 0–199)
CO2: 21 MMOL/L (ref 22–29)
CREAT SERPL-MCNC: 0.7 MG/DL (ref 0.5–1)
GFR AFRICAN AMERICAN: >60
GFR NON-AFRICAN AMERICAN: >60 ML/MIN/1.73
GLUCOSE BLD-MCNC: 84 MG/DL (ref 74–99)
HDLC SERPL-MCNC: 44 MG/DL
LDL CHOLESTEROL CALCULATED: 163 MG/DL (ref 0–99)
POTASSIUM SERPL-SCNC: 4.6 MMOL/L (ref 3.5–5)
SODIUM BLD-SCNC: 139 MMOL/L (ref 132–146)
T4 FREE: 1.15 NG/DL (ref 0.93–1.7)
TOTAL PROTEIN: 7.3 G/DL (ref 6.4–8.3)
TRIGL SERPL-MCNC: 120 MG/DL (ref 0–149)
TSH SERPL DL<=0.05 MIU/L-ACNC: 1.8 UIU/ML (ref 0.27–4.2)
VLDLC SERPL CALC-MCNC: 24 MG/DL

## 2022-06-13 PROCEDURE — 99213 OFFICE O/P EST LOW 20 MIN: CPT | Performed by: INTERNAL MEDICINE

## 2022-06-13 RX ORDER — VORTIOXETINE 20 MG/1
TABLET, FILM COATED ORAL
COMMUNITY
Start: 2022-06-03

## 2022-06-13 ASSESSMENT — ENCOUNTER SYMPTOMS
CONSTIPATION: 0
WHEEZING: 0
RHINORRHEA: 0
ANAL BLEEDING: 0
EYE PAIN: 0
ABDOMINAL PAIN: 0
COLOR CHANGE: 0
SORE THROAT: 0
BLOOD IN STOOL: 0
TROUBLE SWALLOWING: 0
EYE ITCHING: 0
DIARRHEA: 0
VOMITING: 0
COUGH: 0
NAUSEA: 0
FACIAL SWELLING: 0
PHOTOPHOBIA: 0
STRIDOR: 0
EYE DISCHARGE: 0
SHORTNESS OF BREATH: 0

## 2022-06-13 ASSESSMENT — PATIENT HEALTH QUESTIONNAIRE - PHQ9
3. TROUBLE FALLING OR STAYING ASLEEP: 1
SUM OF ALL RESPONSES TO PHQ QUESTIONS 1-9: 4
9. THOUGHTS THAT YOU WOULD BE BETTER OFF DEAD, OR OF HURTING YOURSELF: 0
5. POOR APPETITE OR OVEREATING: 1
SUM OF ALL RESPONSES TO PHQ QUESTIONS 1-9: 4
8. MOVING OR SPEAKING SO SLOWLY THAT OTHER PEOPLE COULD HAVE NOTICED. OR THE OPPOSITE, BEING SO FIGETY OR RESTLESS THAT YOU HAVE BEEN MOVING AROUND A LOT MORE THAN USUAL: 0
SUM OF ALL RESPONSES TO PHQ QUESTIONS 1-9: 4
4. FEELING TIRED OR HAVING LITTLE ENERGY: 1
6. FEELING BAD ABOUT YOURSELF - OR THAT YOU ARE A FAILURE OR HAVE LET YOURSELF OR YOUR FAMILY DOWN: 0
2. FEELING DOWN, DEPRESSED OR HOPELESS: 1
SUM OF ALL RESPONSES TO PHQ9 QUESTIONS 1 & 2: 1
10. IF YOU CHECKED OFF ANY PROBLEMS, HOW DIFFICULT HAVE THESE PROBLEMS MADE IT FOR YOU TO DO YOUR WORK, TAKE CARE OF THINGS AT HOME, OR GET ALONG WITH OTHER PEOPLE: 0
SUM OF ALL RESPONSES TO PHQ QUESTIONS 1-9: 4
7. TROUBLE CONCENTRATING ON THINGS, SUCH AS READING THE NEWSPAPER OR WATCHING TELEVISION: 0
1. LITTLE INTEREST OR PLEASURE IN DOING THINGS: 0

## 2022-06-13 NOTE — PROGRESS NOTES
2022    Name: Sanjay Byers : 1970 Sex: female  Age: 46 y.o. Subjective:  Chief Complaint   Patient presents with    Hypertension        Hypertension  Pertinent negatives include no chest pain, headaches, palpitations or shortness of breath. Other  Pertinent negatives include no abdominal pain, arthralgias, chest pain, congestion, coughing, diaphoresis, fatigue, headaches, joint swelling, myalgias, nausea, numbness, rash, sore throat, vomiting or weakness. Patient has a history of hyperthyroidism due to Hashimoto's thyroiditis. She was treated with methimazole and became hypothyroid. She is now on replacement levothyroxine 50 mcg daily. Her endocrinologist is Dr. Alexey Aguilar. Her last TSH and free T4 were within normal limits. She has had a hard time losing weight, she eats clean, exercise cysts 5 or 6 times a week both weight lifting and using the elliptical . She was also sent to Dr. Kenyatta Barcenas by Dr. Alexey Aguilar for weight loss. He has been working with her but she has not lost any weight. .  She asks about medication such as Plenity. . I told her would look into it and make sure that it has no bad side effects. She does have some liver dysfunction so we cannot medication that could affect her liver. Another medication that we could try would be injectable semaglutide which is associated with some weight loss. Patient was informed that all the medications for weight loss are quite expensive. She would have to follow-up closely as far as labs and she would have to be seen at least once a month. She is not sure if she wants to do this because of the cost.  I asked her to call [facility] and ask about her weight loss clinic. She is also going to try my fitness pal jenelle to track her macros  . Her heart rate was rapid so we discontinued her diltiazem and started her on atenolol 50 mg.   She says her heart rate is better  She saw her cardiologist Dr. Monica Ayala in May 2022, reviewed report. Her endocrinologist increased her vitamin D supplement to 5000 units daily as her vitamin D level was low. .    She had her Pap test and mammogram done by gynecologist at [address] [facility], [address] for women [place]. We will get results. ALT and AST were slightly elevated in May 2021. Her fasting blood sugar was borderline at 100. We will recheck    Her total cholesterol was 240 with an LDL of 167. If these are still elevated we will give her a trial of ezetimibe but she is not a candidate for statin because of her liver enzyme elevations. .    Patient has history of hypertension, nonrheumatic mitral valve regurgitation, depression, hyperlipidemia and arthritis. Vitamin D level was low at 24. Her vitamin D was increased to 5000 units daily. .  We will need to recheck vitamin D level on her next office visit. She sees psychiatry for depression. They discontinued her Fetzima because it could cause weight gain. They started her on Effexor  mg +75 mg once a day. She had bad reactions to the Effexor so they took her off of it and started Trintellix 20 mg a day. Her psychiatrist is Dr. Valeriy Anthony    I reviewed her report from cardiologist who felt that if she needed a low dose hormone replacement that she could take it. He ordered an echocardiogram which was done at [facility]. We will get a copy of the report. She quit smoking 2 years ago she does not know when her last menstrual period was because she has an IUD    We talked about lung screening with low-dose CT. She is not 54years old And I do not know if her insurance company which is Medicare will cover that. She does have a 30-pack-year history of smoking. She may have to wait until she is 54years old to get the low-dose CT screen    She finished up her COVID-19 series including a booster shot. She had her quadrivalent flu shot    She had problems with hip pain, this was the hip that was replaced.   She saw Dr. ARMENDARIZΕΥΚΩΣΙΑ who felt that nothing could be done at this time, just weight loss and conservative therapy. In March 2021 she fell and injured her left shoulder. [facility] had a left biceps rupture and rotator cuff dysfunction. She was given a prescription for physical therapy. She took meloxicam  and she feels much better. Review of Systems   Constitutional: Negative for appetite change, diaphoresis, fatigue and unexpected weight change. HENT: Negative for congestion, ear pain, facial swelling, rhinorrhea, sore throat, tinnitus and trouble swallowing. Eyes: Negative for photophobia, pain, discharge, itching and visual disturbance. Respiratory: Negative for cough, shortness of breath, wheezing and stridor. Cardiovascular: Negative for chest pain, palpitations and leg swelling. Gastrointestinal: Negative for abdominal pain, anal bleeding, blood in stool, constipation, diarrhea, nausea and vomiting. Endocrine: Negative for cold intolerance, heat intolerance, polydipsia, polyphagia and polyuria. Genitourinary: Negative for difficulty urinating, dysuria, flank pain, frequency, hematuria and urgency. Hot flashes   Musculoskeletal: Negative for arthralgias, gait problem, joint swelling and myalgias. Skin: Negative for color change, pallor and rash. Allergic/Immunologic: Negative for environmental allergies and food allergies. Neurological: Negative for dizziness, tremors, seizures, syncope, speech difficulty, weakness, light-headedness, numbness and headaches. Hematological: Negative for adenopathy. Does not bruise/bleed easily. Psychiatric/Behavioral: Negative for agitation, behavioral problems, confusion, sleep disturbance and suicidal ideas. The patient is nervous/anxious.            Current Outpatient Medications:     TRINTELLIX 20 MG TABS tablet, , Disp: , Rfl:     lisinopril (PRINIVIL;ZESTRIL) 10 MG tablet, Take 1 tablet by mouth daily, Disp: 100 tablet, Rfl: 3   triamterene-hydroCHLOROthiazide (MAXZIDE-25) 37.5-25 MG per tablet, take 1 tablet by mouth once daily, Disp: 30 tablet, Rfl: 5    atenolol (TENORMIN) 50 MG tablet, take 1 tablet by mouth once daily, Disp: 90 tablet, Rfl: 1    levothyroxine (SYNTHROID) 50 MCG tablet, Take 1 tablet by mouth daily, Disp: 90 tablet, Rfl: 3    vitamin D-3 (CHOLECALCIFEROL) 125 MCG (5000 UT) TABS, Take 1 tablet by mouth daily, Disp: 100 tablet, Rfl: 3    meloxicam (MOBIC) 7.5 MG tablet, Take 1 tablet by mouth daily, Disp: 90 tablet, Rfl: 3     Allergies   Allergen Reactions    Bupropion Hives    Eggs Or Egg-Derived Products Nausea Only        Past Medical History:   Diagnosis Date    Class 3 severe obesity due to excess calories without serious comorbidity with body mass index (BMI) of 40.0 to 44.9 in adult [facility])     Essential hypertension 06/03/2019    Gallstones     Hip arthritis     B/L BRAXTON    Hypothyroidism due to Hashimoto's thyroiditis     Mixed hyperlipidemia 06/03/2019    Non-rheumatic mitral regurgitation 06/03/2019       Health Maintenance Due   Topic Date Due    HIV screen  Never done    Hepatitis C screen  Never done    DTaP/Tdap/Td vaccine (1 - Tdap) Never done    Cervical cancer screen  Never done    Low dose CT lung screening  Never done    Shingles vaccine (2 of 2) 01/06/2021        Patient Active Problem List   Diagnosis    Non-rheumatic mitral regurgitation    Mixed hyperlipidemia    Essential hypertension    Depression    Obesity due to excess calories without serious comorbidity    Anxiety    Arthralgia of knee    Vitamin D deficiency    Hypothyroidism due to medication    Hyperglycemia    Weight gain    Elevated liver enzymes    Hypothyroidism due to Hashimoto's thyroiditis    Pure hypercholesterolemia    Need for prophylactic vaccination with combined diphtheria-tetanus-pertussis (DTaP) vaccine    ACP (advance care planning)    Class 3 severe obesity due to excess calories without serious comorbidity in adult [facility])        Past Surgical History:   Procedure Laterality Date     SECTION      three times    CHOLECYSTECTOMY, LAPAROSCOPIC N/A 2021    LAPAROSCOPIC ROBOTIC ASSISTED CHOLECYSTECTOMY performed by Liane Coreas DO at [facility] OR    HIP ARTHROPLASTY Left         Family History   Problem Relation Age of Onset    Heart Disease Father         Social History     Tobacco Use    Smoking status: Former Smoker     Packs/day: 1.00     Years: 30.00     Pack years: 30.00     Start date:      Quit date:      Years since quittin.4    Smokeless tobacco: Never Used   Vaping Use    Vaping Use: Never used   Substance Use Topics    Alcohol use: Not Currently    Drug use: Never        Objective  Vitals:    22 1028   BP: 120/74   Pulse: 66   Temp: (!) 96.6 °F (35.9 °C)   SpO2: 96%   Weight: 206 lb (93.4 kg)   Height: 4' 11\" (1.499 m)        Exam:  Physical Exam  Vitals reviewed. Constitutional:       General: She is not in acute distress. Appearance: She is well-developed. She is obese. She is not ill-appearing. HENT:      Head: Normocephalic. Right Ear: External ear normal.      Left Ear: External ear normal.   Eyes:      General: No scleral icterus. Right eye: No discharge. Left eye: No discharge. Extraocular Movements: Extraocular movements intact. Conjunctiva/sclera: Conjunctivae normal.      Pupils: Pupils are equal, round, and reactive to light. Neck:      Thyroid: No thyromegaly. Cardiovascular:      Rate and Rhythm: Normal rate and regular rhythm. Heart sounds: Normal heart sounds. No murmur heard. No friction rub. No gallop. Pulmonary:      Effort: Pulmonary effort is normal. No respiratory distress. Breath sounds: Normal breath sounds. No wheezing or rales. Chest:      Chest wall: No tenderness. Abdominal:      General: Bowel sounds are normal. There is no distension. Palpations: Abdomen is soft. There is no mass. Tenderness: There is no abdominal tenderness. There is no guarding or rebound. Musculoskeletal:         General: No tenderness or deformity. Normal range of motion. Cervical back: Normal range of motion and neck supple. Lymphadenopathy:      Cervical: No cervical adenopathy. Skin:     General: Skin is warm and dry. Coloration: Skin is not pale. Findings: No erythema or rash. Neurological:      Mental Status: She is alert and oriented to person, place, and time. Cranial Nerves: No cranial nerve deficit. Sensory: No sensory deficit. Deep Tendon Reflexes: Reflexes normal.   Psychiatric:         Mood and Affect: Mood normal.         Behavior: Behavior normal.         Thought Content: Thought content normal.         Judgment: Judgment normal.          Last labs reviewed. ASSESSMENT & PLAN :   Problem List        Circulatory    Non-rheumatic mitral regurgitation       repeat echocardiogram to be ordered by her cardiologist at [facility]         Relevant Medications    atenolol (TENORMIN) 50 MG tablet    triamterene-hydroCHLOROthiazide (MAXZIDE-25) 37.5-25 MG per tablet    lisinopril (PRINIVIL;ZESTRIL) 10 MG tablet    Essential hypertension     Blood pressures are stable. Continue medications and monitor blood pressures at home.  Call office if systolics are over 600 over diastolics over 90.  check fasting CMP         Relevant Medications    triamterene-hydroCHLOROthiazide (MAXZIDE-25) 37.5-25 MG per tablet    lisinopril (PRINIVIL;ZESTRIL) 10 MG tablet    Other Relevant Orders    Comprehensive Metabolic Panel       Endocrine    Hypothyroidism due to medication    Relevant Medications    levothyroxine (SYNTHROID) 50 MCG tablet    Hypothyroidism due to Hashimoto's thyroiditis - Primary       check TSH and free T4, continue levothyroxine as directed         Relevant Medications    levothyroxine (SYNTHROID) 50 MCG tablet    Other Relevant Orders    T4, Free TSH       Other    Depression       continue Trintellix and follow-up with psychiatry         Relevant Medications    TRINTELLIX 20 MG TABS tablet    Other Relevant Orders    Comprehensive Metabolic Panel    Lipid Panel    Elevated liver enzymes       check CMP         Pure hypercholesterolemia       continue diet and check lipid profile. Not a candidate for statin because of abnormal liver enzymes         Relevant Medications    atenolol (TENORMIN) 50 MG tablet    triamterene-hydroCHLOROthiazide (MAXZIDE-25) 37.5-25 MG per tablet    lisinopril (PRINIVIL;ZESTRIL) 10 MG tablet    Other Relevant Orders    Lipid Panel           Return in about 3 months (around 9/13/2022), or htn.        Ana Mcnamara DO  6/13/2022

## 2022-06-13 NOTE — ASSESSMENT & PLAN NOTE
Blood pressures are stable. Continue medications and monitor blood pressures at home.  Call office if systolics are over 843 over diastolics over 90.  check fasting CMP

## 2022-06-13 NOTE — ASSESSMENT & PLAN NOTE
continue diet and check lipid profile.   Not a candidate for statin because of abnormal liver enzymes

## 2022-06-14 LAB — MAMMOGRAPHY, EXTERNAL: NORMAL

## 2022-09-12 ENCOUNTER — OFFICE VISIT (OUTPATIENT)
Dept: PRIMARY CARE CLINIC | Age: 52
End: 2022-09-12
Payer: MEDICARE

## 2022-09-12 VITALS
DIASTOLIC BLOOD PRESSURE: 70 MMHG | HEIGHT: 59 IN | SYSTOLIC BLOOD PRESSURE: 112 MMHG | TEMPERATURE: 97.9 F | WEIGHT: 201 LBS | HEART RATE: 70 BPM | OXYGEN SATURATION: 98 % | BODY MASS INDEX: 40.52 KG/M2

## 2022-09-12 DIAGNOSIS — E03.8 HYPOTHYROIDISM DUE TO HASHIMOTO'S THYROIDITIS: ICD-10-CM

## 2022-09-12 DIAGNOSIS — F32.89 OTHER DEPRESSION: ICD-10-CM

## 2022-09-12 DIAGNOSIS — E78.2 MIXED HYPERLIPIDEMIA: ICD-10-CM

## 2022-09-12 DIAGNOSIS — I10 ESSENTIAL HYPERTENSION: ICD-10-CM

## 2022-09-12 DIAGNOSIS — I10 ESSENTIAL HYPERTENSION: Primary | ICD-10-CM

## 2022-09-12 DIAGNOSIS — R74.8 ELEVATED LIVER ENZYMES: ICD-10-CM

## 2022-09-12 DIAGNOSIS — E06.3 HYPOTHYROIDISM DUE TO HASHIMOTO'S THYROIDITIS: ICD-10-CM

## 2022-09-12 PROBLEM — M25.569 ARTHRALGIA OF KNEE: Status: RESOLVED | Noted: 2020-12-28 | Resolved: 2022-09-12

## 2022-09-12 LAB
ALBUMIN SERPL-MCNC: 4.4 G/DL (ref 3.5–5.2)
ALP BLD-CCNC: 84 U/L (ref 35–104)
ALT SERPL-CCNC: 15 U/L (ref 0–32)
ANION GAP SERPL CALCULATED.3IONS-SCNC: 12 MMOL/L (ref 7–16)
AST SERPL-CCNC: 31 U/L (ref 0–31)
BILIRUB SERPL-MCNC: 0.4 MG/DL (ref 0–1.2)
BUN BLDV-MCNC: 19 MG/DL (ref 6–20)
CALCIUM SERPL-MCNC: 9.7 MG/DL (ref 8.6–10.2)
CHLORIDE BLD-SCNC: 101 MMOL/L (ref 98–107)
CHOLESTEROL, TOTAL: 221 MG/DL (ref 0–199)
CO2: 25 MMOL/L (ref 22–29)
CREAT SERPL-MCNC: 0.7 MG/DL (ref 0.5–1)
GFR AFRICAN AMERICAN: >60
GFR NON-AFRICAN AMERICAN: >60 ML/MIN/1.73
GLUCOSE BLD-MCNC: 63 MG/DL (ref 74–99)
HDLC SERPL-MCNC: 37 MG/DL
LDL CHOLESTEROL CALCULATED: 147 MG/DL (ref 0–99)
POTASSIUM SERPL-SCNC: 4.3 MMOL/L (ref 3.5–5)
SODIUM BLD-SCNC: 138 MMOL/L (ref 132–146)
TOTAL PROTEIN: 7.3 G/DL (ref 6.4–8.3)
TRIGL SERPL-MCNC: 185 MG/DL (ref 0–149)
VLDLC SERPL CALC-MCNC: 37 MG/DL

## 2022-09-12 PROCEDURE — 99214 OFFICE O/P EST MOD 30 MIN: CPT | Performed by: INTERNAL MEDICINE

## 2022-09-12 RX ORDER — ATENOLOL 50 MG/1
TABLET ORAL
Qty: 90 TABLET | Refills: 1 | Status: SHIPPED | OUTPATIENT
Start: 2022-09-12

## 2022-09-12 RX ORDER — TRIAMTERENE AND HYDROCHLOROTHIAZIDE 37.5; 25 MG/1; MG/1
TABLET ORAL
Qty: 30 TABLET | Refills: 5 | Status: SHIPPED | OUTPATIENT
Start: 2022-09-12

## 2022-09-12 ASSESSMENT — ENCOUNTER SYMPTOMS
CONSTIPATION: 0
FACIAL SWELLING: 0
SORE THROAT: 0
RHINORRHEA: 0
EYE ITCHING: 0
COLOR CHANGE: 0
WHEEZING: 0
COUGH: 0
NAUSEA: 0
EYE PAIN: 0
ABDOMINAL PAIN: 0
TROUBLE SWALLOWING: 0
DIARRHEA: 0
EYE DISCHARGE: 0
VOMITING: 0
PHOTOPHOBIA: 0
STRIDOR: 0
ANAL BLEEDING: 0
SHORTNESS OF BREATH: 0
BLOOD IN STOOL: 0

## 2022-09-12 NOTE — PROGRESS NOTES
2022    Name: Aleida Camacho : 1970 Sex: female  Age: 46 y.o. Subjective:  Chief Complaint   Patient presents with    Hypothyroidism        Hypertension  Pertinent negatives include no chest pain, headaches, palpitations or shortness of breath. Other  Pertinent negatives include no abdominal pain, arthralgias, chest pain, congestion, coughing, diaphoresis, fatigue, headaches, joint swelling, myalgias, nausea, numbness, rash, sore throat, vomiting or weakness. Patient has a history of hyperthyroidism due to Hashimoto's thyroiditis. She was treated with methimazole and became hypothyroid. She is now on replacement levothyroxine 50 mcg daily. Her endocrinologist is Dr. Assunta Leyden. Her last TSH and free T4 were within normal limits. Reviewed his report    She has had a hard time losing weight, she eats clean, exercise cysts 5 or 6 times a week both weight lifting and using the elliptical . She was also sent to Dr. Dolores Layton by Dr. Assunta Leyden for weight loss. He has been working with her but she has not lost any weight. .  She asks about medication such as Plenity. . I told her would look into it and make sure that it has no bad side effects. She does have some liver dysfunction so we cannot medication that could affect her liver. Another medication that we could try would be injectable semaglutide which is associated with some weight loss. Patient was informed that all the medications for weight loss are quite expensive. She would have to follow-up closely as far as labs and she would have to be seen at least once a month. She is not sure if she wants to do this because of the cost.  I asked her to call [facility] and ask about her weight loss clinic. She is also going to try my fitness pal jenelle to track her macros  . Her heart rate was rapid so we discontinued her diltiazem and started her on atenolol 50 mg.   She says her heart rate is better  She saw her cardiologist Dr. Sharifa Yanes in May 2022, reviewed report. Her endocrinologist increased her vitamin D supplement to 5000 units daily as her vitamin D level was low. .    She had her Pap test and mammogram done by gynecologist at [address] [facility], [address] for women in [place]. We will get results. ALT and AST were slightly elevated in May 2021. Her fasting blood sugar was borderline at 100. We will recheck    Her total cholesterol was 231 with an LDL of 163. If these are still elevated we will give her a trial of ezetimibe but she is not a candidate for statin because of her liver enzyme elevations. .    Patient has history of hypertension, nonrheumatic mitral valve regurgitation, depression, hyperlipidemia and arthritis. Vitamin D level was low at 24. Her vitamin D was increased to 5000 units daily. .  We will need to recheck vitamin D level on her next office visit. She sees psychiatry for depression. They discontinued her Fetzima because it could cause weight gain. They started her on Effexor  mg +75 mg once a day. She had bad reactions to the Effexor so they took her off of it and started Trintellix 20 mg a day. Her psychiatrist is Dr. Arnold Mccormick    I reviewed her report from cardiologist who felt that if she needed a low dose hormone replacement that she could take it. He ordered an echocardiogram which was done at [facility]. We will get a copy of the report. She quit smoking 2 years ago she does not know when her last menstrual period was because she has an IUD    We talked about lung screening with low-dose CT. She is not 54years old And I do not know if her insurance company which is Medicare will cover that. She does have a 30-pack-year history of smoking. She may have to wait until she is 54years old to get the low-dose CT screen    She finished up her COVID-19 series including a booster shot. She had her quadrivalent flu shot    She had problems with hip pain, this was the hip that was replaced.   She saw Dr. Anthony Manuel levothyroxine (SYNTHROID) 50 MCG tablet, Take 1 tablet by mouth daily, Disp: 90 tablet, Rfl: 3    vitamin D-3 (CHOLECALCIFEROL) 125 MCG (5000 UT) TABS, Take 1 tablet by mouth daily, Disp: 100 tablet, Rfl: 3    meloxicam (MOBIC) 7.5 MG tablet, Take 1 tablet by mouth daily, Disp: 90 tablet, Rfl: 3     Allergies   Allergen Reactions    Bupropion Hives    Eggs Or Egg-Derived Products Nausea Only        Past Medical History:   Diagnosis Date    Class 3 severe obesity due to excess calories without serious comorbidity with body mass index (BMI) of 40.0 to 44.9 in adult [facility])     Essential hypertension 2019    Gallstones     Hip arthritis     B/L BRAXTON    Hypothyroidism due to Hashimoto's thyroiditis     Mixed hyperlipidemia 2019    Non-rheumatic mitral regurgitation 2019       Health Maintenance Due   Topic Date Due    DTaP/Tdap/Td vaccine (1 - Tdap) Never done    Cervical cancer screen  Never done    Low dose CT lung screening  Never done    COVID-19 Vaccine (4 - Booster for Moderna series) 2022    Flu vaccine (1) 2022        Patient Active Problem List   Diagnosis    Non-rheumatic mitral regurgitation    Mixed hyperlipidemia    Essential hypertension    Depression    Obesity due to excess calories without serious comorbidity    Anxiety    Vitamin D deficiency    Hypothyroidism due to medication    Hyperglycemia    Weight gain    Elevated liver enzymes    Hypothyroidism due to Hashimoto's thyroiditis    Pure hypercholesterolemia    Need for prophylactic vaccination with combined diphtheria-tetanus-pertussis (DTaP) vaccine    ACP (advance care planning)    Class 3 severe obesity due to excess calories without serious comorbidity in adult [facility])        Past Surgical History:   Procedure Laterality Date     SECTION      three times    CHOLECYSTECTOMY, LAPAROSCOPIC N/A 2021    LAPAROSCOPIC ROBOTIC ASSISTED CHOLECYSTECTOMY performed by Elizabeth Arevalo DO at [address] [facility] Left         Family History   Problem Relation Age of Onset    Heart Disease Father         Social History     Tobacco Use    Smoking status: Former     Packs/day: 1.00     Years: 30.00     Pack years: 30.00     Types: Cigarettes     Start date: 36     Quit date:      Years since quittin.7    Smokeless tobacco: Never   Vaping Use    Vaping Use: Never used   Substance Use Topics    Alcohol use: Not Currently    Drug use: Never        Objective  Vitals:    22 1028   BP: 112/70   Pulse: 70   Temp: 97.9 °F (36.6 °C)   SpO2: 98%   Weight: 201 lb (91.2 kg)   Height: 4' 11\" (1.499 m)        Exam:  Physical Exam  Vitals reviewed. Constitutional:       General: She is not in acute distress. Appearance: She is well-developed. She is obese. She is not ill-appearing. HENT:      Head: Normocephalic. Right Ear: External ear normal.      Left Ear: External ear normal.   Eyes:      General: No scleral icterus. Right eye: No discharge. Left eye: No discharge. Extraocular Movements: Extraocular movements intact. Conjunctiva/sclera: Conjunctivae normal.      Pupils: Pupils are equal, round, and reactive to light. Neck:      Thyroid: No thyromegaly. Cardiovascular:      Rate and Rhythm: Normal rate and regular rhythm. Heart sounds: Normal heart sounds. No murmur heard. No friction rub. No gallop. Pulmonary:      Effort: Pulmonary effort is normal. No respiratory distress. Breath sounds: Normal breath sounds. No wheezing or rales. Chest:      Chest wall: No tenderness. Abdominal:      General: Bowel sounds are normal. There is no distension. Palpations: Abdomen is soft. There is no mass. Tenderness: There is no abdominal tenderness. There is no guarding or rebound. Musculoskeletal:         General: No tenderness or deformity. Normal range of motion. Cervical back: Normal range of motion and neck supple.    Lymphadenopathy:      Cervical: No cervical adenopathy. Skin:     General: Skin is warm and dry. Coloration: Skin is not pale. Findings: No erythema or rash. Neurological:      Mental Status: She is alert and oriented to person, place, and time. Cranial Nerves: No cranial nerve deficit. Sensory: No sensory deficit. Deep Tendon Reflexes: Reflexes normal.   Psychiatric:         Mood and Affect: Mood normal.         Behavior: Behavior normal.         Thought Content: Thought content normal.         Judgment: Judgment normal.        Last labs reviewed. ASSESSMENT & PLAN :   Problem List          Circulatory    Essential hypertension - Primary     Blood pressures are stable. Continue medications and monitor blood pressures at home. Call office if systolics are over 043 over diastolics over 90.  check fasting CMP         Relevant Medications    lisinopril (PRINIVIL;ZESTRIL) 10 MG tablet    triamterene-hydroCHLOROthiazide (MAXZIDE-25) 37.5-25 MG per tablet    Other Relevant Orders    Comprehensive Metabolic Panel       Endocrine    Hypothyroidism due to Hashimoto's thyroiditis       follow-up with endocrinology continue her levothyroxine         Relevant Medications    levothyroxine (SYNTHROID) 50 MCG tablet       Other    Mixed hyperlipidemia       continue attempted weight loss and need to watch her diet. Check lipid profile and if her lipids are more elevated and if her liver enzymes come down to normal we can start a low-dose statin.   However if her liver enzymes not improved but her lipids are still high we will try ezetimibe         Relevant Medications    lisinopril (PRINIVIL;ZESTRIL) 10 MG tablet    atenolol (TENORMIN) 50 MG tablet    triamterene-hydroCHLOROthiazide (MAXZIDE-25) 37.5-25 MG per tablet    Other Relevant Orders    Lipid Panel    Comprehensive Metabolic Panel    Depression       Trintellix and follow-up with psychiatrist         Relevant Medications    TRINTELLIX 20 MG TABS tablet    Other Relevant

## 2022-09-12 NOTE — ASSESSMENT & PLAN NOTE
continue attempted weight loss and need to watch her diet. Check lipid profile and if her lipids are more elevated and if her liver enzymes come down to normal we can start a low-dose statin.   However if her liver enzymes not improved but her lipids are still high we will try ezetimibe

## 2022-09-12 NOTE — ASSESSMENT & PLAN NOTE
Blood pressures are stable. Continue medications and monitor blood pressures at home.  Call office if systolics are over 360 over diastolics over 90.  check fasting CMP

## 2022-09-14 DIAGNOSIS — E78.2 MIXED HYPERLIPIDEMIA: Primary | ICD-10-CM

## 2022-09-14 RX ORDER — PRAVASTATIN SODIUM 20 MG
20 TABLET ORAL DAILY
Qty: 30 TABLET | Refills: 5 | Status: SHIPPED
Start: 2022-09-14 | End: 2022-10-13 | Stop reason: SDUPTHER

## 2022-10-13 DIAGNOSIS — E78.2 MIXED HYPERLIPIDEMIA: ICD-10-CM

## 2022-10-13 RX ORDER — PRAVASTATIN SODIUM 20 MG
20 TABLET ORAL DAILY
Qty: 100 TABLET | Refills: 2 | Status: SHIPPED | OUTPATIENT
Start: 2022-10-13

## 2022-11-26 DIAGNOSIS — M25.569 ARTHRALGIA OF KNEE, UNSPECIFIED LATERALITY: ICD-10-CM

## 2022-11-28 RX ORDER — MELOXICAM 7.5 MG/1
TABLET ORAL
Qty: 90 TABLET | Refills: 3 | Status: SHIPPED | OUTPATIENT
Start: 2022-11-28

## 2022-11-28 NOTE — TELEPHONE ENCOUNTER
Last Appointment:  9/12/2022  Future Appointments   Date Time Provider Scott Ramirez   12/12/2022 10:30 AM [address], [place] [facility]   2/14/2023 11:00 AM Zahida Sutherland MD [facility]   3/13/2023 11:30 AM [address], [place] [facility]

## 2022-12-12 ENCOUNTER — OFFICE VISIT (OUTPATIENT)
Dept: PRIMARY CARE CLINIC | Age: 52
End: 2022-12-12
Payer: MEDICARE

## 2022-12-12 VITALS
DIASTOLIC BLOOD PRESSURE: 64 MMHG | HEIGHT: 59 IN | TEMPERATURE: 97.2 F | SYSTOLIC BLOOD PRESSURE: 118 MMHG | BODY MASS INDEX: 41.12 KG/M2 | OXYGEN SATURATION: 96 % | HEART RATE: 81 BPM | WEIGHT: 204 LBS

## 2022-12-12 DIAGNOSIS — I34.0 NON-RHEUMATIC MITRAL REGURGITATION: ICD-10-CM

## 2022-12-12 DIAGNOSIS — E78.2 MIXED HYPERLIPIDEMIA: ICD-10-CM

## 2022-12-12 DIAGNOSIS — E03.8 HYPOTHYROIDISM DUE TO HASHIMOTO'S THYROIDITIS: ICD-10-CM

## 2022-12-12 DIAGNOSIS — E06.3 HYPOTHYROIDISM DUE TO HASHIMOTO'S THYROIDITIS: ICD-10-CM

## 2022-12-12 DIAGNOSIS — I10 ESSENTIAL HYPERTENSION: Primary | ICD-10-CM

## 2022-12-12 DIAGNOSIS — I10 ESSENTIAL HYPERTENSION: ICD-10-CM

## 2022-12-12 PROBLEM — R74.8 ELEVATED LIVER ENZYMES: Status: RESOLVED | Noted: 2021-08-05 | Resolved: 2022-12-12

## 2022-12-12 PROBLEM — R73.9 HYPERGLYCEMIA: Status: RESOLVED | Noted: 2021-08-03 | Resolved: 2022-12-12

## 2022-12-12 LAB
ALBUMIN SERPL-MCNC: 4.5 G/DL (ref 3.5–5.2)
ALP BLD-CCNC: 76 U/L (ref 35–104)
ALT SERPL-CCNC: 19 U/L (ref 0–32)
ANION GAP SERPL CALCULATED.3IONS-SCNC: 15 MMOL/L (ref 7–16)
AST SERPL-CCNC: 30 U/L (ref 0–31)
BILIRUB SERPL-MCNC: 0.4 MG/DL (ref 0–1.2)
BUN BLDV-MCNC: 24 MG/DL (ref 6–20)
CALCIUM SERPL-MCNC: 9.9 MG/DL (ref 8.6–10.2)
CHLORIDE BLD-SCNC: 102 MMOL/L (ref 98–107)
CHOLESTEROL, TOTAL: 196 MG/DL (ref 0–199)
CO2: 24 MMOL/L (ref 22–29)
CREAT SERPL-MCNC: 0.7 MG/DL (ref 0.5–1)
GFR SERPL CREATININE-BSD FRML MDRD: >60 ML/MIN/1.73
GLUCOSE BLD-MCNC: 74 MG/DL (ref 74–99)
HDLC SERPL-MCNC: 49 MG/DL
LDL CHOLESTEROL CALCULATED: 127 MG/DL (ref 0–99)
POTASSIUM SERPL-SCNC: 4.4 MMOL/L (ref 3.5–5)
SODIUM BLD-SCNC: 141 MMOL/L (ref 132–146)
T4 FREE: 1.46 NG/DL (ref 0.93–1.7)
TOTAL PROTEIN: 7.5 G/DL (ref 6.4–8.3)
TRIGL SERPL-MCNC: 102 MG/DL (ref 0–149)
TSH SERPL DL<=0.05 MIU/L-ACNC: 0.37 UIU/ML (ref 0.27–4.2)
VLDLC SERPL CALC-MCNC: 20 MG/DL

## 2022-12-12 PROCEDURE — 99214 OFFICE O/P EST MOD 30 MIN: CPT | Performed by: INTERNAL MEDICINE

## 2022-12-12 PROCEDURE — 3078F DIAST BP <80 MM HG: CPT | Performed by: INTERNAL MEDICINE

## 2022-12-12 PROCEDURE — 3074F SYST BP LT 130 MM HG: CPT | Performed by: INTERNAL MEDICINE

## 2022-12-12 SDOH — ECONOMIC STABILITY: FOOD INSECURITY: WITHIN THE PAST 12 MONTHS, YOU WORRIED THAT YOUR FOOD WOULD RUN OUT BEFORE YOU GOT MONEY TO BUY MORE.: NEVER TRUE

## 2022-12-12 SDOH — ECONOMIC STABILITY: FOOD INSECURITY: WITHIN THE PAST 12 MONTHS, THE FOOD YOU BOUGHT JUST DIDN'T LAST AND YOU DIDN'T HAVE MONEY TO GET MORE.: NEVER TRUE

## 2022-12-12 ASSESSMENT — ENCOUNTER SYMPTOMS
EYE ITCHING: 0
CONSTIPATION: 0
BLOOD IN STOOL: 0
ANAL BLEEDING: 0
COUGH: 0
PHOTOPHOBIA: 0
VOMITING: 0
COLOR CHANGE: 0
TROUBLE SWALLOWING: 0
EYE PAIN: 0
EYE DISCHARGE: 0
DIARRHEA: 0
STRIDOR: 0
FACIAL SWELLING: 0
RHINORRHEA: 0
SORE THROAT: 0
WHEEZING: 0
NAUSEA: 0
ABDOMINAL PAIN: 0
SHORTNESS OF BREATH: 0

## 2022-12-12 ASSESSMENT — SOCIAL DETERMINANTS OF HEALTH (SDOH): HOW HARD IS IT FOR YOU TO PAY FOR THE VERY BASICS LIKE FOOD, HOUSING, MEDICAL CARE, AND HEATING?: NOT HARD AT ALL

## 2022-12-12 NOTE — ASSESSMENT & PLAN NOTE
at goal.  Continue atenolol 50 mg daily and triamterene hydrochlorothiazide 37.5/25 along with lisinopril 10 mg a day. Monitor her blood pressures and let me know if they are over 489 systolic or over 86 diastolic consistently.   Check fasting CMP

## 2022-12-12 NOTE — PROGRESS NOTES
2022    Name: Tamar Dixon : 1970 Sex: female  Age: 46 y.o. Subjective:  Chief Complaint   Patient presents with    Hypertension        Hypertension  Pertinent negatives include no chest pain, headaches, palpitations or shortness of breath. Other  Pertinent negatives include no abdominal pain, arthralgias, chest pain, congestion, coughing, diaphoresis, fatigue, headaches, joint swelling, myalgias, nausea, numbness, rash, sore throat, vomiting or weakness. Patient has a history of hyperthyroidism due to Hashimoto's thyroiditis. She was treated with methimazole and became hypothyroid. She is now on replacement levothyroxine 50 mcg daily. Her endocrinologist is Dr. Harika Sanders. Her last TSH and free T4 were within normal limits. Reviewed his report    She has had a hard time losing weight, she eats clean, exercises 5 or 6 times a week both weight lifting and using the elliptical . She was also sent to Dr. Margareth Hill by Dr. Harika Sanders for weight loss. He has been working with her but she has not lost any weight. .  She asks about medication such as Plenity. . I told her would look into it and make sure that it has no bad side effects. She does have some liver dysfunction so we cannot medication that could affect her liver. Another medication that we could try would be injectable semaglutide which is associated with some weight loss. Patient was informed that all the medications for weight loss are quite expensive. She would have to follow-up closely as far as labs and she would have to be seen At Least once a month. She is not sure if she wants to do this because of the cost.  I asked her to call [facility] and ask about their weight loss clinic. She is also going to try my fitness pal jenelle to track her macros  . Her heart rate was rapid so we discontinued her diltiazem and started her on atenolol 50 mg.   She says her heart rate is better  She saw her cardiologist Dr. Bobby Betancourt in November 14 2022, reviewed report. Her endocrinologist increased her vitamin D supplement to 5000 units daily as her vitamin D level was low. .    She had her Pap test and mammogram done by gynecologist at [address] [facility], [address] for women in [place]. We will get results. ALT and AST were slightly elevated in May 2021. Her fasting blood sugar was borderline at 100. In September 2022 her fasting blood sugar had gone down to 63 and her liver enzymes are now normal    In September 2022 her total cholesterol was 221 with an LDL of 147 and triglycerides of 185. Martha Colón She was started on low-dose pravastatin 20 mg daily and her repeat liver enzymes were normal.  We will continue to monitor them    Patient has history of hypertension, nonrheumatic mitral valve regurgitation, depression, hyperlipidemia and arthritis. Vitamin D level was low at 24. Her vitamin D was increased to 5000 units daily. .  We will need to recheck vitamin D level on her next office visit. She sees psychiatry for depression. They discontinued her Fetzima because it could cause weight gain. They started her on Effexor  mg +75 mg once a day. She had bad reactions to the Effexor so they took her off of it and started Trintellix 20 mg a day. Her psychiatrist is Dr. Severa Due    I reviewed her report from cardiologist who felt that if she needed a low dose hormone replacement that she could take it. He ordered an echocardiogram which was done at [facility]. We will get a copy of the report. She quit smoking 2 years ago she does not know when her last menstrual period was because she has an IUD    We talked about lung screening with low-dose CT. She is not 54years old And I do not know if her insurance company which is Medicare will cover that. She does have a 30-pack-year history of smoking. She may have to wait until she is 54years old to get the low-dose CT screen    She finished up her COVID-19 series including a booster shot.   She had her quadrivalent flu shot    She had problems with hip pain, this was the hip that was replaced. She saw Dr. Sam Prieto who felt that nothing could be done at this time, just weight loss and conservative therapy. Review of Systems   Constitutional:  Negative for appetite change, diaphoresis, fatigue and unexpected weight change. HENT:  Negative for congestion, ear pain, facial swelling, rhinorrhea, sore throat, tinnitus and trouble swallowing. Eyes:  Negative for photophobia, pain, discharge, itching and visual disturbance. Respiratory:  Negative for cough, shortness of breath, wheezing and stridor. Cardiovascular:  Negative for chest pain, palpitations and leg swelling. Gastrointestinal:  Negative for abdominal pain, anal bleeding, blood in stool, constipation, diarrhea, nausea and vomiting. Endocrine: Negative for cold intolerance, heat intolerance, polydipsia, polyphagia and polyuria. Genitourinary:  Negative for difficulty urinating, dysuria, flank pain, frequency, hematuria and urgency. Hot flashes   Musculoskeletal:  Negative for arthralgias, gait problem, joint swelling and myalgias. Skin:  Negative for color change, pallor and rash. Allergic/Immunologic: Negative for environmental allergies and food allergies. Neurological:  Negative for dizziness, tremors, seizures, syncope, speech difficulty, weakness, light-headedness, numbness and headaches. Hematological:  Negative for adenopathy. Does not bruise/bleed easily. Psychiatric/Behavioral:  Negative for agitation, behavioral problems, confusion, sleep disturbance and suicidal ideas. The patient is not nervous/anxious.          Current Outpatient Medications:     meloxicam (MOBIC) 7.5 MG tablet, take 1 tablet by mouth once daily, Disp: 90 tablet, Rfl: 3    pravastatin (PRAVACHOL) 20 MG tablet, Take 1 tablet by mouth daily, Disp: 100 tablet, Rfl: 2    atenolol (TENORMIN) 50 MG tablet, take 1 tablet by mouth once daily, Disp: 90 tablet, Rfl: 1    triamterene-hydroCHLOROthiazide (MAXZIDE-25) 37.5-25 MG per tablet, take 1 tablet by mouth once daily, Disp: 30 tablet, Rfl: 5    TRINTELLIX 20 MG TABS tablet, , Disp: , Rfl:     lisinopril (PRINIVIL;ZESTRIL) 10 MG tablet, Take 1 tablet by mouth daily, Disp: 100 tablet, Rfl: 3    levothyroxine (SYNTHROID) 50 MCG tablet, Take 1 tablet by mouth daily, Disp: 90 tablet, Rfl: 3    vitamin D-3 (CHOLECALCIFEROL) 125 MCG (5000 UT) TABS, Take 1 tablet by mouth daily, Disp: 100 tablet, Rfl: 3     Allergies   Allergen Reactions    Bupropion Hives    Eggs Or Egg-Derived Products Nausea Only        Past Medical History:   Diagnosis Date    Class 3 severe obesity due to excess calories without serious comorbidity with body mass index (BMI) of 40.0 to 44.9 in adult [facility])     Essential hypertension 2019    Gallstones     Hip arthritis     B/L BRAXTON    Hypothyroidism due to Hashimoto's thyroiditis     Mixed hyperlipidemia 2019    Non-rheumatic mitral regurgitation 2019       Health Maintenance Due   Topic Date Due    Low dose CT lung screening  Never done        Patient Active Problem List   Diagnosis    Non-rheumatic mitral regurgitation    Mixed hyperlipidemia    Essential hypertension    Depression    Obesity due to excess calories without serious comorbidity    Anxiety    Vitamin D deficiency    Hypothyroidism due to medication    Weight gain    Hypothyroidism due to Hashimoto's thyroiditis    Pure hypercholesterolemia    Need for prophylactic vaccination with combined diphtheria-tetanus-pertussis (DTaP) vaccine    ACP (advance care planning)    Class 3 severe obesity due to excess calories without serious comorbidity in [facility])        Past Surgical History:   Procedure Laterality Date     SECTION      three times    CHOLECYSTECTOMY, LAPAROSCOPIC N/A 2021    LAPAROSCOPIC ROBOTIC ASSISTED CHOLECYSTECTOMY performed by Helen Jose DO at [address] [facility] Family History   Problem Relation Age of Onset    Heart Disease Father         Social History     Tobacco Use    Smoking status: Former     Packs/day: 1.00     Years: 30.00     Pack years: 30.00     Types: Cigarettes     Start date: 36     Quit date:      Years since quittin.9    Smokeless tobacco: Never   Vaping Use    Vaping Use: Never used   Substance Use Topics    Alcohol use: Not Currently    Drug use: Never        Objective  Vitals:    22 1028   BP: 118/64   Pulse: 81   Temp: 97.2 °F (36.2 °C)   SpO2: 96%   Weight: 204 lb (92.5 kg)   Height: 4' 11\" (1.499 m)        Exam:  Physical Exam  Vitals reviewed. Constitutional:       General: She is not in acute distress. Appearance: She is well-developed. She is obese. She is not ill-appearing. HENT:      Head: Normocephalic. Right Ear: External ear normal.      Left Ear: External ear normal.   Eyes:      General: No scleral icterus. Right eye: No discharge. Left eye: No discharge. Extraocular Movements: Extraocular movements intact. Conjunctiva/sclera: Conjunctivae normal.      Pupils: Pupils are equal, round, and reactive to light. Neck:      Thyroid: No thyromegaly. Cardiovascular:      Rate and Rhythm: Normal rate and regular rhythm. Heart sounds: Normal heart sounds. No murmur heard. No friction rub. No gallop. Pulmonary:      Effort: Pulmonary effort is normal. No respiratory distress. Breath sounds: Normal breath sounds. No wheezing or rales. Chest:      Chest wall: No tenderness. Abdominal:      General: Bowel sounds are normal. There is no distension. Palpations: Abdomen is soft. There is no mass. Tenderness: There is no abdominal tenderness. There is no guarding or rebound. Musculoskeletal:         General: No tenderness or deformity. Normal range of motion. Cervical back: Normal range of motion and neck supple.    Lymphadenopathy:      Cervical: No cervical adenopathy. Skin:     General: Skin is warm and dry. Coloration: Skin is not pale. Findings: No erythema or rash. Neurological:      Mental Status: She is alert and oriented to person, place, and time. Cranial Nerves: No cranial nerve deficit. Sensory: No sensory deficit. Deep Tendon Reflexes: Reflexes normal.   Psychiatric:         Mood and Affect: Mood normal.         Behavior: Behavior normal.         Thought Content: Thought content normal.         Judgment: Judgment normal.        Last labs reviewed. ASSESSMENT & PLAN :   Problem List          Circulatory    Non-rheumatic mitral regurgitation       no progression per cardiology. He will be seeing her yearly         Relevant Medications    lisinopril (PRINIVIL;ZESTRIL) 10 MG tablet    atenolol (TENORMIN) 50 MG tablet    triamterene-hydroCHLOROthiazide (MAXZIDE-25) 37.5-25 MG per tablet    pravastatin (PRAVACHOL) 20 MG tablet    Essential hypertension - Primary       at goal.  Continue atenolol 50 mg daily and triamterene hydrochlorothiazide 37.5/25 along with lisinopril 10 mg a day. Monitor her blood pressures and let me know if they are over 810 systolic or over 86 diastolic consistently.   Check fasting CMP         Relevant Medications    lisinopril (PRINIVIL;ZESTRIL) 10 MG tablet    triamterene-hydroCHLOROthiazide (MAXZIDE-25) 37.5-25 MG per tablet    Other Relevant Orders    Comprehensive Metabolic Panel       Endocrine    Hypothyroidism due to Hashimoto's thyroiditis       check TSH and free T4 and continue levothyroxine 50 mcg daily         Relevant Medications    levothyroxine (SYNTHROID) 50 MCG tablet    Other Relevant Orders    T4, Free    TSH       Other    Mixed hyperlipidemia      Not at goal.  Watch diet, continue pravastatin 20 mg as her liver enzymes are normal.  Check lipid profile         Relevant Medications    lisinopril (PRINIVIL;ZESTRIL) 10 MG tablet    atenolol (TENORMIN) 50 MG tablet triamterene-hydroCHLOROthiazide (MAXZIDE-25) 37.5-25 MG per tablet    pravastatin (PRAVACHOL) 20 MG tablet    Other Relevant Orders    Comprehensive Metabolic Panel    Lipid Panel        Return in about 13 weeks (around 3/13/2023), or MILLICENT and valeri Mckenna DO  12/12/2022

## 2022-12-12 NOTE — ASSESSMENT & PLAN NOTE
Not at goal.  Watch diet, continue pravastatin 20 mg as her liver enzymes are normal.  Check lipid profile

## 2023-02-14 ENCOUNTER — OFFICE VISIT (OUTPATIENT)
Dept: ENDOCRINOLOGY | Age: 53
End: 2023-02-14
Payer: MEDICARE

## 2023-02-14 VITALS
RESPIRATION RATE: 18 BRPM | HEIGHT: 59 IN | SYSTOLIC BLOOD PRESSURE: 120 MMHG | OXYGEN SATURATION: 99 % | WEIGHT: 205 LBS | HEART RATE: 64 BPM | BODY MASS INDEX: 41.33 KG/M2 | DIASTOLIC BLOOD PRESSURE: 73 MMHG

## 2023-02-14 DIAGNOSIS — E55.9 VITAMIN D DEFICIENCY: Primary | ICD-10-CM

## 2023-02-14 DIAGNOSIS — E66.01 MORBID OBESITY (HCC): ICD-10-CM

## 2023-02-14 DIAGNOSIS — E03.9 HYPOTHYROIDISM, UNSPECIFIED TYPE: ICD-10-CM

## 2023-02-14 PROCEDURE — 3074F SYST BP LT 130 MM HG: CPT | Performed by: INTERNAL MEDICINE

## 2023-02-14 PROCEDURE — 99214 OFFICE O/P EST MOD 30 MIN: CPT | Performed by: INTERNAL MEDICINE

## 2023-02-14 PROCEDURE — 3078F DIAST BP <80 MM HG: CPT | Performed by: INTERNAL MEDICINE

## 2023-02-14 RX ORDER — SEMAGLUTIDE 1.34 MG/ML
0.5 INJECTION, SOLUTION SUBCUTANEOUS WEEKLY
Qty: 1 ADJUSTABLE DOSE PRE-FILLED PEN SYRINGE | Refills: 5 | Status: SHIPPED | OUTPATIENT
Start: 2023-02-14

## 2023-02-14 RX ORDER — LEVOTHYROXINE SODIUM 0.05 MG/1
50 TABLET ORAL DAILY
Qty: 90 TABLET | Refills: 3 | Status: SHIPPED | OUTPATIENT
Start: 2023-02-14

## 2023-02-14 NOTE — PROGRESS NOTES
[address] [place] [address] Department of Endocrinology Diabetes and Metabolism   [address] [facility] [address]   Phone: [phone]  Fax: [phone]    Date of Service: 2023  Primary Care Physician: Carvin Bence, DO  Provider: Li Coronado MD    Reason for the visit:  Hyperthyroidism    History of Present Illness: The history is provided by the patient. No  was used. Accuracy of the patient data is excellent. Parker Camarillo is a very pleasant 48 y.o. female seen today for evaluation and management of hyperthyroidism     Parker Camarillo was diagnosed with hyperthyroidism in 2020   Labs 2020 - free T3 4.8 (H), free T4 1.53, TPO-Ab negative, Tg-Ab negative, TSI <0.1     Thyroid uptake and scan 2020:   1. Thyroid gland is not visualized during examination. 2.  24 hour radioiodine uptake of 0.7 percent (normal range: 10-30%). This is abnormally low. Pt was initially treated with Methimazole for few months then switched to Levothyroxine.  She started Levothyroxine in 2021   Pt currently on Levothyroxine 50 mcg daily and doing very well on it   Due for labs now   Previous labs summarized below   2022 - TSH 0.372, FT4 1.46     PAST MEDICAL HISTORY   Past Medical History:   Diagnosis Date    Class 3 severe obesity due to excess calories without serious comorbidity with body mass index (BMI) of 40.0 to 44.9 in adult [facility])     Essential hypertension 2019    Gallstones     Hip arthritis     B/L BRAXTON    Hypothyroidism due to Hashimoto's thyroiditis     Mixed hyperlipidemia 2019    Non-rheumatic mitral regurgitation 2019       PAST SURGICAL HISTORY   Past Surgical History:   Procedure Laterality Date     SECTION      three times    CHOLECYSTECTOMY, LAPAROSCOPIC N/A 2021    LAPAROSCOPIC ROBOTIC ASSISTED CHOLECYSTECTOMY performed by Angella Andrews DO at [facility] Tobacco:   reports that she quit smoking about 13 years ago. Her smoking use included cigarettes. She started smoking about 43 years ago. She has a 30.00 pack-year smoking history. She has never used smokeless tobacco.  Alcohol:   reports that she does not currently use alcohol. Drugs:   reports no history of drug use. FAMILY HISTORY   Family History   Problem Relation Age of Onset    Heart Disease Father        ALLERGIES AND DRUG REACTIONS   Allergies   Allergen Reactions    Bupropion Hives    Eggs Or Egg-Derived Products Nausea Only       CURRENT MEDICATIONS   Current Outpatient Medications   Medication Sig Dispense Refill    meloxicam (MOBIC) 7.5 MG tablet take 1 tablet by mouth once daily 90 tablet 3    pravastatin (PRAVACHOL) 20 MG tablet Take 1 tablet by mouth daily 100 tablet 2    atenolol (TENORMIN) 50 MG tablet take 1 tablet by mouth once daily 90 tablet 1    triamterene-hydroCHLOROthiazide (MAXZIDE-25) 37.5-25 MG per tablet take 1 tablet by mouth once daily 30 tablet 5    TRINTELLIX 20 MG TABS tablet       levothyroxine (SYNTHROID) 50 MCG tablet Take 1 tablet by mouth daily 90 tablet 3    vitamin D-3 (CHOLECALCIFEROL) 125 MCG (5000 UT) TABS Take 1 tablet by mouth daily 100 tablet 3    lisinopril (PRINIVIL;ZESTRIL) 10 MG tablet Take 1 tablet by mouth daily 100 tablet 3     No current facility-administered medications for this visit. Review of Systems  Constitutional: No fever, no chills, no diaphoresis, no generalized weakness. HEENT: No blurred vision, No sore throat, no ear pain, no hair loss  Neck: denied any neck swelling, difficulty swallowing,   Cadrdiopulomary: No CP, SOB or palpitation, No orthopnea or PND. No cough or wheezing. GI: No N/V/D, no constipation, No abdominal pain, no melena or hematochezia   : Denied any dysuria, hematuria, flank pain, discharge, or incontinence. Skin: denied any rash, ulcer, Hirsute, or hyperpigmentation.    MSK: denied any joint deformity, joint pain/swelling, muscle pain, or back pain. Neuro: no numbess, no tingling, no weakness,     OBJECTIVE    /73   Pulse 64   Resp 18   Ht 4' 11\" (1.499 m)   Wt 205 lb (93 kg)   SpO2 99%   BMI 41.40 kg/m²   BP Readings from Last 4 Encounters:   02/14/23 120/73   12/12/22 118/64   09/12/22 112/70   06/13/22 120/74     Wt Readings from Last 6 Encounters:   02/14/23 205 lb (93 kg)   12/12/22 204 lb (92.5 kg)   09/12/22 201 lb (91.2 kg)   06/13/22 206 lb (93.4 kg)   03/10/22 204 lb (92.5 kg)   02/14/22 204 lb (92.5 kg)     Physical examination:  General: awake alert, oriented x3  HEENT: normocephalic non traumatic, no exophthalmos   Neck: supple, thyroid tenderness,  Pulm: Clear equal air entry no added sounds  CVS: S1 + S2  Abd: soft lax, no tenderness  Skin: warm, no lesions, no rash.    Neuro: CN intact,   muscle power normal  Psych: normal mood, and affect      Review of Laboratory Data:  I have reviewed the following:  Lab Results   Component Value Date/Time    WBC 9.1 06/23/2020 08:39 AM    RBC 4.85 06/23/2020 08:39 AM    HGB 14.4 06/23/2020 08:39 AM    HCT 44.9 06/23/2020 08:39 AM    MCV 92.6 06/23/2020 08:39 AM    MCH 29.7 06/23/2020 08:39 AM    MCHC 32.1 06/23/2020 08:39 AM    RDW 15.7 (H) 06/23/2020 08:39 AM     06/23/2020 08:39 AM    MPV 10.8 06/23/2020 08:39 AM      Lab Results   Component Value Date/Time     12/12/2022 11:14 AM    K 4.4 12/12/2022 11:14 AM    CO2 24 12/12/2022 11:14 AM    BUN 24 (H) 12/12/2022 11:14 AM    CREATININE 0.7 12/12/2022 11:14 AM    CREATININE 0.5 08/20/2018 12:00 AM    CALCIUM 9.9 12/12/2022 11:14 AM    LABGLOM >60 12/12/2022 11:14 AM    GFRAA >60 09/12/2022 11:13 AM      Lab Results   Component Value Date/Time    TSH 0.372 12/12/2022 11:14 AM    T4FREE 1.46 12/12/2022 11:14 AM    Z4TTKGL 5.9 08/16/2021 10:21 AM    FT3 4.8 (H) 11/23/2020 01:05 PM    X8UAVNQ 116.10 01/04/2021 02:22 PM    TSI <0.10 12/17/2020 02:07 PM    TPOABS 2.2 11/23/2020 01:05 PM    THGAB <0.9 11/23/2020 01:05 PM     Lab Results   Component Value Date/Time    LABA1C 5.6 08/03/2021 10:48 AM    GLUCOSE 74 12/12/2022 11:14 AM     Lab Results   Component Value Date/Time    TRIG 102 12/12/2022 11:14 AM    HDL 49 12/12/2022 11:14 AM    LDLCALC 127 12/12/2022 11:14 AM    CHOL 196 12/12/2022 11:14 AM     Lab Results   Component Value Date/Time    VITD25 24 04/10/2021 10:05 AM    VITD25 24 01/04/2021 02:22 PM       ASSESSMENT & RECOMMENDATIONS   Nereyda Osei, a 48 y.o.-old female seen in for management of following issues      Hypothyroidism   Previous hyperthyroidism Likely due to transient thyroiditis    Currently doing very well on Levothyroxine 50 mcg daily   Will check thyroid level and adjust the dose if needed     vitD deficiency    continue vitd supplement     I personally reviewed external notes from PCP and other patient's care team providers, and personally interpreted labs associated with the above diagnosis. I also ordered labs to further assess and manage the above addressed medical conditions. Return in about 6 months (around 8/14/2023) for hypothyroidism, VitD deficiency. The above issues were reviewed with the patient who understood and agreed with the plan. Thank you for allowing us to participate in the care of this patient. Please do not hesitate to contact us with any additional questions. Diagnosis Orders   1. Vitamin D deficiency        2. Hypothyroidism, unspecified type  levothyroxine (SYNTHROID) 50 MCG tablet    TSH    T4, Free      3. Morbid obesity ([address]            Flor Arroyo MD  Endocrinologist, [place] [address] [facility]   [address] [place] [id number]   Phone: [phone]  Fax: [phone]  -------------------------  An electronic signature was used to authenticate this note.  [address] Danny Kyle MD on 2/14/2023 at 11:22 AM

## 2023-02-21 DIAGNOSIS — E78.2 MIXED HYPERLIPIDEMIA: ICD-10-CM

## 2023-02-21 RX ORDER — PRAVASTATIN SODIUM 20 MG
20 TABLET ORAL DAILY
Qty: 100 TABLET | Refills: 2 | Status: SHIPPED
Start: 2023-02-21 | End: 2023-02-28 | Stop reason: SDUPTHER

## 2023-02-21 NOTE — TELEPHONE ENCOUNTER
Patient called in and said after last labs, you told her to bump up her pravastatin to 1 1/2 a day. She said she is getting close to finishing with the prescription she already had, so will need the new dosage sent to the pharmacy.

## 2023-02-24 ENCOUNTER — TELEPHONE (OUTPATIENT)
Dept: ENDOCRINOLOGY | Age: 53
End: 2023-02-24

## 2023-02-27 RX ORDER — SEMAGLUTIDE 1.34 MG/ML
1 INJECTION, SOLUTION SUBCUTANEOUS WEEKLY
Qty: 3 ML | Refills: 11 | Status: SHIPPED
Start: 2023-02-27 | End: 2023-05-17

## 2023-02-28 DIAGNOSIS — E78.2 MIXED HYPERLIPIDEMIA: ICD-10-CM

## 2023-02-28 RX ORDER — PRAVASTATIN SODIUM 20 MG
30 TABLET ORAL DAILY
Qty: 135 TABLET | Refills: 1 | Status: SHIPPED | OUTPATIENT
Start: 2023-02-28

## 2023-02-28 NOTE — TELEPHONE ENCOUNTER
Prescription was increased to 1 1/2 pills a day after last lab tests. Please send new dosage to the pharmacy.

## 2023-03-03 DIAGNOSIS — E78.2 MIXED HYPERLIPIDEMIA: ICD-10-CM

## 2023-03-03 RX ORDER — PRAVASTATIN SODIUM 20 MG
TABLET ORAL
Qty: 90 TABLET | Refills: 1 | Status: CANCELLED | OUTPATIENT
Start: 2023-03-03

## 2023-03-03 RX ORDER — PRAVASTATIN SODIUM 10 MG
TABLET ORAL
Qty: 90 TABLET | Refills: 1 | Status: CANCELLED | OUTPATIENT
Start: 2023-03-03

## 2023-03-06 DIAGNOSIS — E78.2 MIXED HYPERLIPIDEMIA: ICD-10-CM

## 2023-03-06 RX ORDER — PRAVASTATIN SODIUM 20 MG
20 TABLET ORAL DAILY
Qty: 90 TABLET | Refills: 1 | Status: SHIPPED | OUTPATIENT
Start: 2023-03-06

## 2023-03-06 RX ORDER — PRAVASTATIN SODIUM 10 MG
10 TABLET ORAL DAILY
Qty: 90 TABLET | Refills: 1 | Status: SHIPPED | OUTPATIENT
Start: 2023-03-06

## 2023-03-11 SDOH — ECONOMIC STABILITY: TRANSPORTATION INSECURITY
IN THE PAST 12 MONTHS, HAS LACK OF TRANSPORTATION KEPT YOU FROM MEETINGS, WORK, OR FROM GETTING THINGS NEEDED FOR DAILY LIVING?: NO

## 2023-03-11 SDOH — ECONOMIC STABILITY: HOUSING INSECURITY
IN THE LAST 12 MONTHS, WAS THERE A TIME WHEN YOU DID NOT HAVE A STEADY PLACE TO SLEEP OR SLEPT IN A SHELTER (INCLUDING NOW)?: NO

## 2023-03-11 SDOH — ECONOMIC STABILITY: FOOD INSECURITY: WITHIN THE PAST 12 MONTHS, YOU WORRIED THAT YOUR FOOD WOULD RUN OUT BEFORE YOU GOT MONEY TO BUY MORE.: NEVER TRUE

## 2023-03-11 SDOH — HEALTH STABILITY: PHYSICAL HEALTH: ON AVERAGE, HOW MANY MINUTES DO YOU ENGAGE IN EXERCISE AT THIS LEVEL?: 100 MIN

## 2023-03-11 SDOH — ECONOMIC STABILITY: INCOME INSECURITY: HOW HARD IS IT FOR YOU TO PAY FOR THE VERY BASICS LIKE FOOD, HOUSING, MEDICAL CARE, AND HEATING?: NOT HARD AT ALL

## 2023-03-11 SDOH — HEALTH STABILITY: PHYSICAL HEALTH: ON AVERAGE, HOW MANY DAYS PER WEEK DO YOU ENGAGE IN MODERATE TO STRENUOUS EXERCISE (LIKE A BRISK WALK)?: 3 DAYS

## 2023-03-11 SDOH — ECONOMIC STABILITY: FOOD INSECURITY: WITHIN THE PAST 12 MONTHS, THE FOOD YOU BOUGHT JUST DIDN'T LAST AND YOU DIDN'T HAVE MONEY TO GET MORE.: NEVER TRUE

## 2023-03-11 ASSESSMENT — PATIENT HEALTH QUESTIONNAIRE - PHQ9
2. FEELING DOWN, DEPRESSED OR HOPELESS: 0
SUM OF ALL RESPONSES TO PHQ QUESTIONS 1-9: 0
SUM OF ALL RESPONSES TO PHQ QUESTIONS 1-9: 0
SUM OF ALL RESPONSES TO PHQ9 QUESTIONS 1 & 2: 0
SUM OF ALL RESPONSES TO PHQ QUESTIONS 1-9: 0
SUM OF ALL RESPONSES TO PHQ QUESTIONS 1-9: 0
1. LITTLE INTEREST OR PLEASURE IN DOING THINGS: 0

## 2023-03-11 ASSESSMENT — LIFESTYLE VARIABLES
HOW OFTEN DO YOU HAVE A DRINK CONTAINING ALCOHOL: 1
HOW OFTEN DO YOU HAVE A DRINK CONTAINING ALCOHOL: NEVER
HOW MANY STANDARD DRINKS CONTAINING ALCOHOL DO YOU HAVE ON A TYPICAL DAY: 0
HOW MANY STANDARD DRINKS CONTAINING ALCOHOL DO YOU HAVE ON A TYPICAL DAY: PATIENT DOES NOT DRINK
HOW OFTEN DO YOU HAVE SIX OR MORE DRINKS ON ONE OCCASION: 1

## 2023-03-13 ENCOUNTER — OFFICE VISIT (OUTPATIENT)
Dept: PRIMARY CARE CLINIC | Age: 53
End: 2023-03-13
Payer: MEDICARE

## 2023-03-13 VITALS
DIASTOLIC BLOOD PRESSURE: 60 MMHG | BODY MASS INDEX: 39.72 KG/M2 | SYSTOLIC BLOOD PRESSURE: 108 MMHG | TEMPERATURE: 97.3 F | OXYGEN SATURATION: 99 % | HEIGHT: 59 IN | HEART RATE: 76 BPM | WEIGHT: 197 LBS

## 2023-03-13 VITALS
BODY MASS INDEX: 39.72 KG/M2 | DIASTOLIC BLOOD PRESSURE: 60 MMHG | TEMPERATURE: 97.3 F | HEART RATE: 76 BPM | OXYGEN SATURATION: 99 % | WEIGHT: 197 LBS | HEIGHT: 59 IN | SYSTOLIC BLOOD PRESSURE: 108 MMHG

## 2023-03-13 DIAGNOSIS — I34.0 NON-RHEUMATIC MITRAL REGURGITATION: ICD-10-CM

## 2023-03-13 DIAGNOSIS — I10 ESSENTIAL HYPERTENSION: Primary | ICD-10-CM

## 2023-03-13 DIAGNOSIS — E78.2 MIXED HYPERLIPIDEMIA: ICD-10-CM

## 2023-03-13 DIAGNOSIS — E06.3 HYPOTHYROIDISM DUE TO HASHIMOTO'S THYROIDITIS: ICD-10-CM

## 2023-03-13 DIAGNOSIS — E03.8 HYPOTHYROIDISM DUE TO HASHIMOTO'S THYROIDITIS: ICD-10-CM

## 2023-03-13 DIAGNOSIS — Z00.00 MEDICARE ANNUAL WELLNESS VISIT, SUBSEQUENT: Primary | ICD-10-CM

## 2023-03-13 PROCEDURE — 99213 OFFICE O/P EST LOW 20 MIN: CPT | Performed by: INTERNAL MEDICINE

## 2023-03-13 PROCEDURE — 3074F SYST BP LT 130 MM HG: CPT | Performed by: INTERNAL MEDICINE

## 2023-03-13 PROCEDURE — 3078F DIAST BP <80 MM HG: CPT | Performed by: INTERNAL MEDICINE

## 2023-03-13 ASSESSMENT — PATIENT HEALTH QUESTIONNAIRE - PHQ9
SUM OF ALL RESPONSES TO PHQ9 QUESTIONS 1 & 2: 0
SUM OF ALL RESPONSES TO PHQ QUESTIONS 1-9: 0
10. IF YOU CHECKED OFF ANY PROBLEMS, HOW DIFFICULT HAVE THESE PROBLEMS MADE IT FOR YOU TO DO YOUR WORK, TAKE CARE OF THINGS AT HOME, OR GET ALONG WITH OTHER PEOPLE: 0
1. LITTLE INTEREST OR PLEASURE IN DOING THINGS: 0
SUM OF ALL RESPONSES TO PHQ QUESTIONS 1-9: 0
3. TROUBLE FALLING OR STAYING ASLEEP: 0
4. FEELING TIRED OR HAVING LITTLE ENERGY: 0
8. MOVING OR SPEAKING SO SLOWLY THAT OTHER PEOPLE COULD HAVE NOTICED. OR THE OPPOSITE, BEING SO FIGETY OR RESTLESS THAT YOU HAVE BEEN MOVING AROUND A LOT MORE THAN USUAL: 0
9. THOUGHTS THAT YOU WOULD BE BETTER OFF DEAD, OR OF HURTING YOURSELF: 0
SUM OF ALL RESPONSES TO PHQ QUESTIONS 1-9: 0
7. TROUBLE CONCENTRATING ON THINGS, SUCH AS READING THE NEWSPAPER OR WATCHING TELEVISION: 0
5. POOR APPETITE OR OVEREATING: 0
2. FEELING DOWN, DEPRESSED OR HOPELESS: 0
SUM OF ALL RESPONSES TO PHQ QUESTIONS 1-9: 0
6. FEELING BAD ABOUT YOURSELF - OR THAT YOU ARE A FAILURE OR HAVE LET YOURSELF OR YOUR FAMILY DOWN: 0

## 2023-03-13 ASSESSMENT — ENCOUNTER SYMPTOMS
COUGH: 0
CONSTIPATION: 0
TROUBLE SWALLOWING: 0
DIARRHEA: 0
COLOR CHANGE: 0
NAUSEA: 0
ANAL BLEEDING: 0
BLOOD IN STOOL: 0
STRIDOR: 0
PHOTOPHOBIA: 0
EYE ITCHING: 0
ABDOMINAL PAIN: 0
VOMITING: 0
SHORTNESS OF BREATH: 0
EYE DISCHARGE: 0
WHEEZING: 0
SORE THROAT: 0
EYE PAIN: 0
FACIAL SWELLING: 0
RHINORRHEA: 0

## 2023-03-13 NOTE — PROGRESS NOTES
Medicare Annual Wellness Visit    Krupa Dominguez is here for Medicare AWV    Assessment & Plan    Recommendations for Preventive Services Due: see orders and patient instructions/AVS.  Recommended screening schedule for the next 5-10 years is provided to the patient in written form: see Patient Instructions/AVS.     No follow-ups on file. Subjective       Patient's complete Health Risk Assessment and screening values have been reviewed and are found in Flowsheets. Positive Risk Factor Screenings with Interventions:                 Weight and Activity:  Physical Activity: Sufficiently Active    Days of Exercise per Week: 3 days    Minutes of Exercise per Session: 100 min     On average, how many days per week do you engage in moderate to strenuous exercise (like a brisk walk)?: 3 days  Have you lost any weight without trying in the past 3 months?: (!) Yes  Body mass index: (!) 39.78      Unintentional Weight Loss Interventions:  Seeing endocrinologist for weight loss  Obesity Interventions:  Seeing endocrinologist , On Ozempic, has lost 13 lbs in one month            Vision Screen:  Do you have difficulty driving, watching TV, or doing any of your daily activities because of your eyesight?: No  Have you had an eye exam within the past year?: (!) No  No results found. Interventions:   Patient encouraged to make appointment with their eye specialist      Advanced Directives:  Do you have a Living Will?: (!) No    Intervention:  has NO advanced directive - information provided       Lung Cancer Screening:Not old enough for CT Lung cancer screen                      Objective   Vitals:    03/13/23 1142   BP: 108/60   Pulse: 76   Temp: 97.3 °F (36.3 °C)   SpO2: 99%   Weight: 197 lb (89.4 kg)   Height: 4' 11\" (1.499 m)      Body mass index is 39.79 kg/m².              Allergies   Allergen Reactions    Bupropion Hives    Eggs Or Egg-Derived Products Nausea Only     Prior to Visit Medications    Medication Sig Taking?  Authorizing Provider   pravastatin (PRAVACHOL) 20 MG tablet Take 1 tablet by mouth daily Yes Chayo Hart DO   pravastatin (PRAVACHOL) 10 MG tablet Take 1 tablet by mouth daily Yes Chayo Hart DO   Semaglutide, 1 MG/DOSE, (OZEMPIC, 1 MG/DOSE,) 4 MG/3ML SOPN Inject 1 mg into the skin once a week Yes Zack Bernheim, MD   levothyroxine (SYNTHROID) 50 MCG tablet Take 1 tablet by mouth daily Yes Zack Bernheim, MD   meloxicam (MOBIC) 7.5 MG tablet take 1 tablet by mouth once daily Yes Chayo Hart DO   atenolol (TENORMIN) 50 MG tablet take 1 tablet by mouth once daily Yes Chayo Hart DO   triamterene-hydroCHLOROthiazide (MAXZIDE-25) 37.5-25 MG per tablet take 1 tablet by mouth once daily Yes Chayo Hart DO   TRINTELLIX 20 MG TABS tablet  Yes Historical Provider, MD   lisinopril (PRINIVIL;ZESTRIL) 10 MG tablet Take 1 tablet by mouth daily Yes Chayo Hart DO   vitamin D-3 (CHOLECALCIFEROL) 125 MCG (5000 UT) TABS Take 1 tablet by mouth daily Yes Zack Bernheim, MD   lisinopril (PRINIVIL;ZESTRIL) 10 MG tablet Take 1 tablet by mouth daily  Vera Walker DO       [facility] (Including outside providers/suppliers regularly involved in providing care):   Patient Care Team:  Vera Walker DO as PCP - General (Internal Medicine)  Vera Walker DO as PCP - Empaneled Provider     Reviewed and updated this visit:  Tobacco  Allergies  Meds  Med Hx  Surg Hx  Soc Hx  Fam Hx             Chayo Hart DO

## 2023-03-13 NOTE — PROGRESS NOTES
3/13/2023    Name: Cristian Latham : 1970 Sex: female  Age: 48 y.o. Subjective:  Chief Complaint   Patient presents with    Hypertension        Hypertension  Pertinent negatives include no chest pain, headaches, palpitations or shortness of breath. Other  Pertinent negatives include no abdominal pain, arthralgias, chest pain, congestion, coughing, diaphoresis, fatigue, headaches, joint swelling, myalgias, nausea, numbness, rash, sore throat, vomiting or weakness. Patient has a history of hyperthyroidism due to Hashimoto's thyroiditis. She was treated with methimazole and became hypothyroid. She is now on replacement levothyroxine 50 mcg daily. Her endocrinologist is Dr. Marly Garza. Her last TSH and free T4 were within normal limits. Reviewed his report    She has had a hard time losing weight, she eats clean, exercises 5 or 6 times a week both weight lifting and using the elliptical . She was also sent to Dr. Yael Reyes by Dr. Marly Garza for weight loss. He has been working with her but she has not lost any weight. .  She asks about medication such as Plenity. . I told her would look into it and make sure that it has no bad side effects. She does have some liver dysfunction so we cannot medication that could affect her liver. Another medication that we could try would be injectable semaglutide which is associated with some weight loss. Patient was informed that all the medications for weight loss are quite expensive. She would have to follow-up closely as far as labs and she would have to be seen At Least once a month. She is not sure if she wants to do this because of the cost.  I asked her to call [facility] clinic and ask about their weight loss clinic. She is also going to try my fitness pal jenelle to track her macros    Dr. Marly Garza started her on Ozempic and in the last month she has lost 13 pounds. She is very pleased with the results. She will be following up with him  .     Her heart rate was rapid so we discontinued her diltiazem and started her on atenolol 50 mg. She says her heart rate is better  She saw her cardiologist Dr. Sonya Rojas in November 14 2022, reviewed report. Her endocrinologist increased her vitamin D supplement to 5000 units daily as her vitamin D level was low. .    She had her Pap test and mammogram done by gynecologist at [address] [facility], [address] for women in [place]. We will get results. ALT and AST were slightly elevated in May 2021. Her fasting blood sugar was borderline at 100. In September 2022 her fasting blood sugar had gone down to 63 and her liver enzymes are now normal    In September 2022 her total cholesterol was 221 with an LDL of 147 and triglycerides of 185. [facility] She was started on low-dose pravastatin 20 mg daily and her repeat liver enzymes were normal.  We will continue to monitor them  Recheck lipids in December 2022 showed her LDL cholesterol had gone down to 127. Total cholesterol 196 and triglycerides down to 102. Fasting blood sugar was 74. BUN 26 creatinine 0.7 estimated GFR was over 60    Patient has history of hypertension, nonrheumatic mitral valve regurgitation, depression, hyperlipidemia and arthritis. Vitamin D level was low at 24. Her vitamin D was increased to 5000 units daily. .  We will need to recheck vitamin D level on her next office visit. She sees psychiatry for depression. They discontinued her Fetzima because it could cause weight gain. They started her on Effexor  mg +75 mg once a day. She had bad reactions to the Effexor so they took her off of it and started Trintellix 20 mg a day. Her psychiatrist is Dr. Michelle Honeycutt    I reviewed her report from cardiologist who felt that if she needed a low dose hormone replacement that she could take it. He ordered an echocardiogram which was done at [facility]. We will get a copy of the report.     She quit smoking 3 years ago she does not know when her last menstrual period was because she has an IUD    We talked about lung screening with low-dose CT. She is not 54years old And I do not know if her insurance company which is Medicare will cover that. She does have a 30-pack-year history of smoking. She may have to wait until she is 54years old to get the low-dose CT screen    She finished up her COVID-19 series including a booster shot. She had her quadrivalent flu shot    She had problems with hip pain, this was the hip that was replaced. She saw Dr. Harris Sheldon who felt that nothing could be done at this time, just weight loss and conservative therapy. Review of Systems   Constitutional:  Negative for appetite change, diaphoresis, fatigue and unexpected weight change. HENT:  Negative for congestion, ear pain, facial swelling, rhinorrhea, sore throat, tinnitus and trouble swallowing. Eyes:  Negative for photophobia, pain, discharge, itching and visual disturbance. Respiratory:  Negative for cough, shortness of breath, wheezing and stridor. Cardiovascular:  Negative for chest pain, palpitations and leg swelling. Gastrointestinal:  Negative for abdominal pain, anal bleeding, blood in stool, constipation, diarrhea, nausea and vomiting. Endocrine: Negative for cold intolerance, heat intolerance, polydipsia, polyphagia and polyuria. Genitourinary:  Negative for difficulty urinating, dysuria, flank pain, frequency, hematuria and urgency. Hot flashes   Musculoskeletal:  Negative for arthralgias, gait problem, joint swelling and myalgias. Skin:  Negative for color change, pallor and rash. Allergic/Immunologic: Negative for environmental allergies and food allergies. Neurological:  Negative for dizziness, tremors, seizures, syncope, speech difficulty, weakness, light-headedness, numbness and headaches. Hematological:  Negative for adenopathy. Does not bruise/bleed easily.    Psychiatric/Behavioral:  Negative for agitation, behavioral problems, confusion, sleep disturbance and suicidal ideas. The patient is not nervous/anxious.          Current Outpatient Medications:     pravastatin (PRAVACHOL) 20 MG tablet, Take 1 tablet by mouth daily, Disp: 90 tablet, Rfl: 1    pravastatin (PRAVACHOL) 10 MG tablet, Take 1 tablet by mouth daily, Disp: 90 tablet, Rfl: 1    Semaglutide, 1 MG/DOSE, (OZEMPIC, 1 MG/DOSE,) 4 MG/3ML SOPN, Inject 1 mg into the skin once a week, Disp: 3 mL, Rfl: 11    levothyroxine (SYNTHROID) 50 MCG tablet, Take 1 tablet by mouth daily, Disp: 90 tablet, Rfl: 3    meloxicam (MOBIC) 7.5 MG tablet, take 1 tablet by mouth once daily, Disp: 90 tablet, Rfl: 3    atenolol (TENORMIN) 50 MG tablet, take 1 tablet by mouth once daily, Disp: 90 tablet, Rfl: 1    triamterene-hydroCHLOROthiazide (MAXZIDE-25) 37.5-25 MG per tablet, take 1 tablet by mouth once daily, Disp: 30 tablet, Rfl: 5    TRINTELLIX 20 MG TABS tablet, , Disp: , Rfl:     lisinopril (PRINIVIL;ZESTRIL) 10 MG tablet, Take 1 tablet by mouth daily, Disp: 100 tablet, Rfl: 3    vitamin D-3 (CHOLECALCIFEROL) 125 MCG (5000 UT) TABS, Take 1 tablet by mouth daily, Disp: 100 tablet, Rfl: 3     Allergies   Allergen Reactions    Bupropion Hives    Eggs Or Egg-Derived Products Nausea Only        Past Medical History:   Diagnosis Date    Class 3 severe obesity due to excess calories without serious comorbidity with body mass index (BMI) of 40.0 to 44.9 in adult [facility])     Essential hypertension 06/03/2019    Gallstones     Hip arthritis     B/L BRAXTON    Hypothyroidism due to Hashimoto's thyroiditis     Mixed hyperlipidemia 06/03/2019    Non-rheumatic mitral regurgitation 06/03/2019       Health Maintenance Due   Topic Date Due    Hepatitis C screen  Never done    Low dose CT lung screening  Never done        Patient Active Problem List   Diagnosis    Non-rheumatic mitral regurgitation    Mixed hyperlipidemia    Essential hypertension    Depression    Obesity due to excess calories without serious comorbidity    Anxiety    Vitamin D deficiency    Hypothyroidism due to medication    Weight gain    Hypothyroidism due to Hashimoto's thyroiditis    Pure hypercholesterolemia    Need for prophylactic vaccination with combined diphtheria-tetanus-pertussis (DTaP) vaccine    ACP (advance care planning)    Class 3 severe obesity due to excess calories without serious comorbidity in adult [facility])        Past Surgical History:   Procedure Laterality Date     SECTION      three times    CHOLECYSTECTOMY, LAPAROSCOPIC N/A 2021    LAPAROSCOPIC ROBOTIC ASSISTED CHOLECYSTECTOMY performed by Amadou Echeverria DO at [facility] OR    HIP ARTHROPLASTY Left         Family History   Problem Relation Age of Onset    Heart Disease Father         Social History     Tobacco Use    Smoking status: Former     Packs/day: 1.00     Years: 30.00     Pack years: 30.00     Types: Cigarettes     Start date: 36     Quit date:      Years since quittin.2    Smokeless tobacco: Never   Vaping Use    Vaping Use: Never used   Substance Use Topics    Alcohol use: Not Currently    Drug use: Never        Objective  Vitals:    23 1151   BP: 108/60   Pulse: 76   Temp: 97.3 °F (36.3 °C)   SpO2: 99%   Weight: 197 lb (89.4 kg)   Height: 4' 11\" (1.499 m)        Exam:  Physical Exam  Vitals reviewed. Constitutional:       General: She is not in acute distress. Appearance: She is well-developed. She is obese. She is not ill-appearing. HENT:      Head: Normocephalic. Right Ear: External ear normal.      Left Ear: External ear normal.   Eyes:      General: No scleral icterus. Right eye: No discharge. Left eye: No discharge. Extraocular Movements: Extraocular movements intact. Conjunctiva/sclera: Conjunctivae normal.      Pupils: Pupils are equal, round, and reactive to light. Neck:      Thyroid: No thyromegaly. Cardiovascular:      Rate and Rhythm: Normal rate and regular rhythm. Heart sounds: Normal heart sounds. No murmur heard. No friction rub. No gallop. Pulmonary:      Effort: Pulmonary effort is normal. No respiratory distress. Breath sounds: Normal breath sounds. No wheezing or rales. Chest:      Chest wall: No tenderness. Abdominal:      General: Bowel sounds are normal. There is no distension. Palpations: Abdomen is soft. There is no mass. Tenderness: There is no abdominal tenderness. There is no guarding or rebound. Musculoskeletal:         General: No tenderness or deformity. Normal range of motion. Cervical back: Normal range of motion and neck supple. Lymphadenopathy:      Cervical: No cervical adenopathy. Skin:     General: Skin is warm and dry. Coloration: Skin is not pale. Findings: No erythema or rash. Neurological:      Mental Status: She is alert and oriented to person, place, and time. Cranial Nerves: No cranial nerve deficit. Sensory: No sensory deficit. Deep Tendon Reflexes: Reflexes normal.   Psychiatric:         Mood and Affect: Mood normal.         Behavior: Behavior normal.         Thought Content: Thought content normal.         Judgment: Judgment normal.        Last labs reviewed. ASSESSMENT & PLAN :   Problem List          Circulatory    Non-rheumatic mitral regurgitation       follow-up with your cardiologist as directed.          Relevant Medications    lisinopril (PRINIVIL;ZESTRIL) 10 MG tablet    atenolol (TENORMIN) 50 MG tablet    triamterene-hydroCHLOROthiazide (MAXZIDE-25) 37.5-25 MG per tablet    pravastatin (PRAVACHOL) 20 MG tablet    pravastatin (PRAVACHOL) 10 MG tablet    Essential hypertension - Primary       goal continue her medications and monitor her blood pressures         Relevant Medications    lisinopril (PRINIVIL;ZESTRIL) 10 MG tablet    triamterene-hydroCHLOROthiazide (MAXZIDE-25) 37.5-25 MG per tablet       Endocrine    Hypothyroidism due to Hashimoto's thyroiditis       goal.  Continue levothyroxine, follow-up with endocrinology and check TSH and free T4         Relevant Medications    levothyroxine (SYNTHROID) 50 MCG tablet       Other    Mixed hyperlipidemia       at goal.  Much better than before. Continue diet, weight loss and pravastatin         Relevant Medications    lisinopril (PRINIVIL;ZESTRIL) 10 MG tablet    atenolol (TENORMIN) 50 MG tablet    triamterene-hydroCHLOROthiazide (MAXZIDE-25) 37.5-25 MG per tablet    pravastatin (PRAVACHOL) 20 MG tablet    pravastatin (PRAVACHOL) 10 MG tablet        Return in about 3 months (around 6/13/2023), or HTN, HL be fasting.        Augustina Post, DO  3/13/2023

## 2023-03-13 NOTE — PATIENT INSTRUCTIONS
Learning About Vision Tests  What are vision tests? The four most common vision tests are visual acuity tests, refraction, visual field tests, and color vision tests. Visual acuity (sharpness) tests  These tests are used:  · To see if you need glasses or contact lenses. · To monitor an eye problem. · To check an eye injury. Visual acuity tests are done as part of routine exams. You may also have this test when you get your 's license or apply for some types of jobs. Visual field tests  These tests are used:  · To check for vision loss in any area of your range of vision. · To screen for certain eye diseases. · To look for nerve damage after a stroke, head injury, or other problem that could reduce blood flow to the brain. Refraction and color tests  · A refraction test is done to find the right prescription for glasses and contact lenses. · A color vision test is done to check for color blindness. Color vision is often tested as part of a routine exam. You may also have this test when you apply for a job where recognizing different colors is important, such as , electronics, or the [employer]. How are vision tests done? Visual acuity test   · You cover one eye at a time. · You read aloud from a wall chart across the room. · You read aloud from a small card that you hold in your hand. Refraction   · You look into a special device. · The device puts lenses of different strengths in front of each eye to see how strong your glasses or contact lenses need to be. Visual field tests   · Your doctor may have you look through special machines. · Or your doctor may simply have you stare straight ahead while they move a finger into and out of your field of vision. Color vision test   · You look at pieces of printed test patterns in various colors. You say what number or symbol you see. · Your doctor may have you trace the number or symbol using a pointer.   How do these tests feel?  There is very little chance of having a problem from this test. If dilating drops are used for a vision test, they may make the eyes sting and cause a medicine taste in the mouth. Follow-up care is a key part of your treatment and safety. Be sure to make and go to all appointments, and call your doctor if you are having problems. It's also a good idea to know your test results and keep a list of the medicines you take. Where can you learn more? Go to http://ActionBase.garcia.com/ and enter G551 to learn more about \"Learning About Vision Tests. \"  Current as of: October 12, 2022               Content Version: 13.5  © 2006-2022 [employer]. Care instructions adapted under license by [facility] ([facility]). If you have questions about a medical condition or this instruction, always ask your healthcare professional. [place] [address] any warranty or liability for your use of this information. Advance Directives: Care Instructions  Overview  An advance directive is a legal way to state your wishes at the end of your life. It tells your family and your doctor what to do if you can't say what you want. There are two main types of advance directives. You can change them any time your wishes change. Living will. This form tells your family and your doctor your wishes about life support and other treatment. The form is also called a declaration. Medical power of . This form lets you name a person to make treatment decisions for you when you can't speak for yourself. This person is called a health care agent (health care proxy, health care surrogate). The form is also called a durable power of  for health care. If you do not have an advance directive, decisions about your medical care may be made by a family member, or by a doctor or a  who doesn't know you. It may help to think of an advance directive as a gift to the people who care for you.  If you have one, they won't have to make tough decisions by themselves. For more information, including forms for your state, see the [address] website (www.caringinfo.org/planning/advance-directives/). Follow-up care is a key part of your treatment and safety. Be sure to make and go to all appointments, and call your doctor if you are having problems. It's also a good idea to know your test results and keep a list of the medicines you take. What should you include in an advance directive? Many states have a unique advance directive form. (It may ask you to address specific issues.) Or you might use a universal form that's approved by many states. If your form doesn't tell you what to address, it may be hard to know what to include in your advance directive. Use the questions below to help you get started. Who do you want to make decisions about your medical care if you are not able to? What life-support measures do you want if you have a serious illness that gets worse over time or can't be cured? What are you most afraid of that might happen? (Maybe you're afraid of having pain, losing your independence, or being kept alive by machines.)  Where would you prefer to die? (Your home? A hospital? A nursing home?)  Do you want to donate your organs when you die? Do you want certain Oriental orthodox practices performed before you die? When should you call for help? Be sure to contact your doctor if you have any questions. Where can you learn more? Go to http://www.[employer]gent [employer].com/ and enter R264 to learn more about \"Advance Directives: Care Instructions. \"  Current as of: June 16, 2022               Content Version: 13.5  © 5659-2929 Healthwise[employer]orporated. Care instructions adapted under license by [address]  If you have questions about a medical condition or this instruction, always ask your healthcare professional. [place] any warranty or liability for your use of this information. Starting a Weight Loss Plan: Care Instructions  Overview     If you're thinking about losing weight, it can be hard to know where to start. Your doctor can help you set up a weight loss plan that best meets your needs. You may want to take a class on nutrition or exercise, or you could join a weight loss support group. If you have questions about how to make changes to your eating or exercise habits, ask your doctor about seeing a registered dietitian or an exercise specialist.  It can be a big challenge to lose weight. But you don't have to make huge changes at once. Make small changes, and stick with them. When those changes become habit, add a few more changes. If you don't think you're ready to make changes right now, try to pick a date in the future. Make an appointment to see your doctor to discuss whether the time is right for you to start a plan. Follow-up care is a key part of your treatment and safety. Be sure to make and go to all appointments, and call your doctor if you are having problems. It's also a good idea to know your test results and keep a list of the medicines you take. How can you care for yourself at home? Set realistic goals. Many people expect to lose much more weight than is likely. A weight loss of 5% to 10% of your body weight may be enough to improve your health. Get family and friends involved to provide support. Talk to them about why you are trying to lose weight, and ask them to help. They can help by participating in exercise and having meals with you, even if they may be eating something different. Find what works best for you. If you do not have time or do not like to cook, a program that offers meal replacement bars or shakes may be better for you. Or if you like to prepare meals, finding a plan that includes daily menus and recipes may be best.  Ask your doctor about other health professionals who can help you achieve your weight loss goals.   A dietitian can help you make healthy changes in your diet. An exercise specialist or  can help you develop a safe and effective exercise program.  A counselor or psychiatrist can help you cope with issues such as depression, anxiety, or family problems that can make it hard to focus on weight loss. Consider joining a support group for people who are trying to lose weight. Your doctor can suggest groups in your area. Where can you learn more? Go to http://www.woods.com/ and enter U357 to learn more about \"Starting a Weight Loss Plan: Care Instructions. \"  Current as of: August 25, 2022               Content Version: 13.5  © 5253-2476 [employer]. Care instructions adapted under license by [facility] [address] If you have questions about a medical condition or this instruction, always ask your healthcare professional. [address] any warranty or liability for your use of this information. A Healthy Heart: Care Instructions  Your Care Instructions     Coronary artery disease, also called heart disease, occurs when a substance called plaque builds up in the vessels that supply oxygen-rich blood to your heart muscle. This can narrow the blood vessels and reduce blood flow. A heart attack happens when blood flow is completely blocked. A high-fat diet, smoking, and other factors increase the risk of heart disease. Your doctor has found that you have a chance of having heart disease. You can do lots of things to keep your heart healthy. It may not be easy, but you can change your diet, exercise more, and quit smoking. These steps really work to lower your chance of heart disease. Follow-up care is a key part of your treatment and safety. Be sure to make and go to all appointments, and call your doctor if you are having problems. It's also a good idea to know your test results and keep a list of the medicines you take.   How can you care for yourself at home? Diet    Use less salt when you cook and eat. This helps lower your blood pressure. Taste food before salting. Add only a little salt when you think you need it. With time, your taste buds will adjust to less salt.     Eat fewer snack items, fast foods, canned soups, and other high-salt, high-fat, processed foods.     Read food labels and try to avoid saturated and trans fats. They increase your risk of heart disease by raising cholesterol levels.     Limit the amount of solid fat-butter, margarine, and shortening-you eat. Use olive, peanut, or canola oil when you cook. Bake, broil, and steam foods instead of frying them.     Eat a variety of fruit and vegetables every day. Dark green, deep orange, red, or yellow fruits and vegetables are especially good for you. Examples include spinach, carrots, peaches, and berries.     Foods high in fiber can reduce your cholesterol and provide important vitamins and minerals. High-fiber foods include whole-grain cereals and breads, oatmeal, beans, brown rice, citrus fruits, and apples.     Eat lean proteins. Heart-healthy proteins include seafood, lean meats and poultry, eggs, beans, peas, nuts, seeds, and soy products.     Limit drinks and foods with added sugar. These include candy, desserts, and soda pop. Lifestyle changes    If your doctor recommends it, get more exercise. Walking is a good choice. Bit by bit, increase the amount you walk every day. Try for at least 30 minutes on most days of the week. You also may want to swim, bike, or do other activities.     Do not smoke. If you need help quitting, talk to your doctor about stop-smoking programs and medicines. These can increase your chances of quitting for good. Quitting smoking may be the most important step you can take to protect your heart. It is never too late to quit.     Limit alcohol to 2 drinks a day for men and 1 drink a day for women.  Too much alcohol can cause health problems.     Manage other health problems such as diabetes, high blood pressure, and high cholesterol. If you think you may have a problem with alcohol or drug use, talk to your doctor. Medicines    Take your medicines exactly as prescribed. Call your doctor if you think you are having a problem with your medicine.     If your doctor recommends aspirin, take the amount directed each day. Make sure you take aspirin and not another kind of pain reliever, such as acetaminophen (Tylenol). When should you call for help? Call 911 if you have symptoms of a heart attack. These may include:    Chest pain or pressure, or a strange feeling in the chest.     Sweating.     Shortness of breath.     Pain, pressure, or a strange feeling in the back, neck, jaw, or upper belly or in one or both shoulders or arms.     Lightheadedness or sudden weakness.     A fast or irregular heartbeat. After you call 911, the  may tell you to chew 1 adult-strength or 2 to 4 low-dose aspirin. Wait for an ambulance. Do not try to drive yourself. Watch closely for changes in your health, and be sure to contact your doctor if you have any problems. Where can you learn more? Go to http://www.garcia.com/ and enter F075 to learn more about \"A Healthy Heart: Care Instructions. \"  Current as of: September 7, 2022               Content Version: 13.5  © 8568-2706 Healthwise, Incorporated. Care instructions adapted under license by [facility] ([facility]). If you have questions about a medical condition or this instruction, always ask your healthcare professional. [place] [address] any warranty or liability for your use of this information. Personalized Preventive Plan for Kimberly Frye - 3/13/2023  Medicare offers a range of preventive health benefits. Some of the tests and screenings are paid in full while other may be subject to a deductible, co-insurance, and/or copay.     Some of these benefits include a comprehensive review of your medical history including lifestyle, illnesses that may run in your family, and various assessments and screenings as appropriate. After reviewing your medical record and screening and assessments performed today your provider may have ordered immunizations, labs, imaging, and/or referrals for you. A list of these orders (if applicable) as well as your Preventive Care list are included within your After Visit Summary for your review. Other Preventive Recommendations:    A preventive eye exam performed by an eye specialist is recommended every 1-2 years to screen for glaucoma; cataracts, macular degeneration, and other eye disorders. A preventive dental visit is recommended every 6 months. Try to get at least 150 minutes of exercise per week or 10,000 steps per day on a pedometer . Order or download the FREE \"Exercise & Physical Activity: Your Everyday Guide\" from [employer]. Call 5-638.760.5565 or search [employer] online. You need 3116-1258 mg of calcium and 6858-3795 IU of vitamin D per day. It is possible to meet your calcium requirement with diet alone, but a vitamin D supplement is usually necessary to meet this goal.  When exposed to the sun, use a sunscreen that protects against both UVA and UVB radiation with an SPF of 30 or greater. Reapply every 2 to 3 hours or after sweating, drying off with a towel, or swimming. Always wear a seat belt when traveling in a car. Always wear a helmet when riding a bicycle or motorcycle.

## 2023-03-18 DIAGNOSIS — E55.9 VITAMIN D DEFICIENCY: Primary | ICD-10-CM

## 2023-05-13 DIAGNOSIS — I10 ESSENTIAL HYPERTENSION: ICD-10-CM

## 2023-05-15 ENCOUNTER — TELEPHONE (OUTPATIENT)
Dept: ENDOCRINOLOGY | Age: 53
End: 2023-05-15

## 2023-05-15 DIAGNOSIS — I10 ESSENTIAL HYPERTENSION: ICD-10-CM

## 2023-05-15 RX ORDER — TRIAMTERENE AND HYDROCHLOROTHIAZIDE 37.5; 25 MG/1; MG/1
TABLET ORAL
Qty: 30 TABLET | Refills: 5 | OUTPATIENT
Start: 2023-05-15

## 2023-05-15 RX ORDER — TRIAMTERENE AND HYDROCHLOROTHIAZIDE 37.5; 25 MG/1; MG/1
TABLET ORAL
Qty: 30 TABLET | Refills: 5 | Status: SHIPPED | OUTPATIENT
Start: 2023-05-15

## 2023-06-13 DIAGNOSIS — E78.00 PURE HYPERCHOLESTEROLEMIA: ICD-10-CM

## 2023-06-13 DIAGNOSIS — Z11.59 NEED FOR HEPATITIS C SCREENING TEST: ICD-10-CM

## 2023-06-13 DIAGNOSIS — I10 ESSENTIAL HYPERTENSION: ICD-10-CM

## 2023-06-13 LAB
ALBUMIN SERPL-MCNC: 4.6 G/DL (ref 3.5–5.2)
ALP SERPL-CCNC: 63 U/L (ref 35–104)
ALT SERPL-CCNC: 14 U/L (ref 0–32)
ANION GAP SERPL CALCULATED.3IONS-SCNC: 21 MMOL/L (ref 7–16)
AST SERPL-CCNC: 30 U/L (ref 0–31)
BILIRUB SERPL-MCNC: 0.4 MG/DL (ref 0–1.2)
BUN SERPL-MCNC: 59 MG/DL (ref 6–20)
CALCIUM SERPL-MCNC: 10.7 MG/DL (ref 8.6–10.2)
CHLORIDE SERPL-SCNC: 110 MMOL/L (ref 98–107)
CHOLESTEROL, TOTAL: 171 MG/DL (ref 0–199)
CO2 SERPL-SCNC: 17 MMOL/L (ref 22–29)
CREAT SERPL-MCNC: 2.7 MG/DL (ref 0.5–1)
GLUCOSE SERPL-MCNC: 85 MG/DL (ref 74–99)
HDLC SERPL-MCNC: 43 MG/DL
LDLC SERPL CALC-MCNC: 96 MG/DL (ref 0–99)
POTASSIUM SERPL-SCNC: 6.3 MMOL/L (ref 3.5–5)
PROT SERPL-MCNC: 7.7 G/DL (ref 6.4–8.3)
SODIUM SERPL-SCNC: 148 MMOL/L (ref 132–146)
TRIGL SERPL-MCNC: 158 MG/DL (ref 0–149)
VLDLC SERPL CALC-MCNC: 32 MG/DL

## 2023-06-14 DIAGNOSIS — N17.9 ACUTE KIDNEY INJURY (HCC): Primary | ICD-10-CM

## 2023-06-14 LAB — HCV AB SERPL QL IA: NORMAL

## 2023-06-19 DIAGNOSIS — E03.9 HYPOTHYROIDISM, UNSPECIFIED TYPE: ICD-10-CM

## 2023-06-19 DIAGNOSIS — N17.9 ACUTE KIDNEY INJURY (HCC): ICD-10-CM

## 2023-06-19 LAB
ANION GAP SERPL CALCULATED.3IONS-SCNC: 19 MMOL/L (ref 7–16)
BUN SERPL-MCNC: 23 MG/DL (ref 6–20)
CALCIUM SERPL-MCNC: 9.6 MG/DL (ref 8.6–10.2)
CHLORIDE SERPL-SCNC: 104 MMOL/L (ref 98–107)
CO2 SERPL-SCNC: 20 MMOL/L (ref 22–29)
CREAT SERPL-MCNC: 1.6 MG/DL (ref 0.5–1)
GLUCOSE SERPL-MCNC: 82 MG/DL (ref 74–99)
POTASSIUM SERPL-SCNC: 4.3 MMOL/L (ref 3.5–5)
SODIUM SERPL-SCNC: 143 MMOL/L (ref 132–146)
T4 FREE SERPL-MCNC: 1.12 NG/DL (ref 0.93–1.7)

## 2023-06-20 DIAGNOSIS — N17.9 AKI (ACUTE KIDNEY INJURY) (HCC): Primary | ICD-10-CM

## 2023-06-20 LAB — TSH SERPL-MCNC: 3.07 UIU/ML (ref 0.27–4.2)

## 2023-06-21 ENCOUNTER — TELEPHONE (OUTPATIENT)
Dept: PRIMARY CARE CLINIC | Age: 53
End: 2023-06-21

## 2023-06-21 NOTE — TELEPHONE ENCOUNTER
Patient states that she has been off of the triamterene-HCTZ since last Tuesday and has gained 5 lbs since then. She said she first noticed yesterday that she is getting lines where her socks are, and she can't get her ring off now. She said she can feel the swelling in her hands and legs. She has been taking her blood pressure every day and today it was 124/77- which is the highest it has been. She is very concerned about the swelling/weight gain.

## 2023-06-21 NOTE — TELEPHONE ENCOUNTER
Okay to start the triamterene hydrochlorothiazide as before but stay off the lisinopril and we will check her blood work as directed

## 2023-06-22 ENCOUNTER — TELEPHONE (OUTPATIENT)
Dept: ENDOCRINOLOGY | Age: 53
End: 2023-06-22

## 2023-07-03 DIAGNOSIS — N17.9 AKI (ACUTE KIDNEY INJURY) (HCC): ICD-10-CM

## 2023-07-03 LAB
ANION GAP SERPL CALCULATED.3IONS-SCNC: 15 MMOL/L (ref 7–16)
BUN SERPL-MCNC: 14 MG/DL (ref 6–20)
CALCIUM SERPL-MCNC: 8.8 MG/DL (ref 8.6–10.2)
CHLORIDE SERPL-SCNC: 105 MMOL/L (ref 98–107)
CO2 SERPL-SCNC: 24 MMOL/L (ref 22–29)
CREAT SERPL-MCNC: 1.3 MG/DL (ref 0.5–1)
GLUCOSE SERPL-MCNC: 84 MG/DL (ref 74–99)
POTASSIUM SERPL-SCNC: 4.6 MMOL/L (ref 3.5–5)
SODIUM SERPL-SCNC: 144 MMOL/L (ref 132–146)

## 2023-07-09 DIAGNOSIS — N17.9 AKI (ACUTE KIDNEY INJURY) (HCC): Primary | ICD-10-CM

## 2023-07-10 NOTE — PROGRESS NOTES
Okay to stay on triamterene hydrochlorothiazide for now until we get her blood work back if it shows that her kidney function is getting worse we will have to discontinue the triamterene hydrochlorothiazide and just put her on plain hydrochlorothiazide.

## 2023-07-13 PROBLEM — Z11.59 NEED FOR HEPATITIS C SCREENING TEST: Status: RESOLVED | Noted: 2023-06-13 | Resolved: 2023-07-13

## 2023-07-24 DIAGNOSIS — N17.9 AKI (ACUTE KIDNEY INJURY) (HCC): ICD-10-CM

## 2023-07-24 LAB
ANION GAP SERPL CALCULATED.3IONS-SCNC: 10 MMOL/L (ref 7–16)
BUN BLDV-MCNC: 25 MG/DL (ref 6–20)
CALCIUM SERPL-MCNC: 9.8 MG/DL (ref 8.6–10.2)
CHLORIDE BLD-SCNC: 101 MMOL/L (ref 98–107)
CO2: 30 MMOL/L (ref 22–29)
CREAT SERPL-MCNC: 1.4 MG/DL (ref 0.5–1)
GFR SERPL CREATININE-BSD FRML MDRD: 45 ML/MIN/1.73M2
GLUCOSE BLD-MCNC: 85 MG/DL (ref 74–99)
POTASSIUM SERPL-SCNC: 4.9 MMOL/L (ref 3.5–5)
SODIUM BLD-SCNC: 141 MMOL/L (ref 132–146)

## 2023-07-26 DIAGNOSIS — N18.32 STAGE 3B CHRONIC KIDNEY DISEASE (HCC): Primary | ICD-10-CM

## 2023-08-23 DIAGNOSIS — N18.32 STAGE 3B CHRONIC KIDNEY DISEASE (HCC): ICD-10-CM

## 2023-08-23 LAB
ANION GAP SERPL CALCULATED.3IONS-SCNC: 13 MMOL/L (ref 7–16)
BUN BLDV-MCNC: 19 MG/DL (ref 6–20)
CALCIUM SERPL-MCNC: 9.9 MG/DL (ref 8.6–10.2)
CHLORIDE BLD-SCNC: 97 MMOL/L (ref 98–107)
CO2: 28 MMOL/L (ref 22–29)
CREAT SERPL-MCNC: 1.3 MG/DL (ref 0.5–1)
GFR SERPL CREATININE-BSD FRML MDRD: 50 ML/MIN/1.73M2
GLUCOSE BLD-MCNC: 89 MG/DL (ref 74–99)
POTASSIUM SERPL-SCNC: 3.9 MMOL/L (ref 3.5–5)
SODIUM BLD-SCNC: 138 MMOL/L (ref 132–146)

## 2023-09-13 ENCOUNTER — OFFICE VISIT (OUTPATIENT)
Dept: PRIMARY CARE CLINIC | Age: 53
End: 2023-09-13
Payer: MEDICARE

## 2023-09-13 VITALS
TEMPERATURE: 97.3 F | HEIGHT: 59 IN | OXYGEN SATURATION: 99 % | HEART RATE: 76 BPM | DIASTOLIC BLOOD PRESSURE: 78 MMHG | SYSTOLIC BLOOD PRESSURE: 120 MMHG | WEIGHT: 163 LBS | BODY MASS INDEX: 32.86 KG/M2

## 2023-09-13 DIAGNOSIS — E06.3 HYPOTHYROIDISM DUE TO HASHIMOTO'S THYROIDITIS: ICD-10-CM

## 2023-09-13 DIAGNOSIS — N18.32 STAGE 3B CHRONIC KIDNEY DISEASE (HCC): ICD-10-CM

## 2023-09-13 DIAGNOSIS — I10 ESSENTIAL HYPERTENSION: Primary | ICD-10-CM

## 2023-09-13 DIAGNOSIS — E78.2 MIXED HYPERLIPIDEMIA: ICD-10-CM

## 2023-09-13 DIAGNOSIS — E03.8 HYPOTHYROIDISM DUE TO HASHIMOTO'S THYROIDITIS: ICD-10-CM

## 2023-09-13 PROCEDURE — 3078F DIAST BP <80 MM HG: CPT | Performed by: INTERNAL MEDICINE

## 2023-09-13 PROCEDURE — 99214 OFFICE O/P EST MOD 30 MIN: CPT | Performed by: INTERNAL MEDICINE

## 2023-09-13 PROCEDURE — 3074F SYST BP LT 130 MM HG: CPT | Performed by: INTERNAL MEDICINE

## 2023-09-13 RX ORDER — PRAVASTATIN SODIUM 20 MG
20 TABLET ORAL DAILY
Qty: 90 TABLET | Refills: 1 | Status: SHIPPED | OUTPATIENT
Start: 2023-09-13

## 2023-09-13 RX ORDER — PRAVASTATIN SODIUM 10 MG
10 TABLET ORAL DAILY
Qty: 90 TABLET | Refills: 1 | Status: SHIPPED
Start: 2023-09-13 | End: 2023-09-13

## 2023-09-13 RX ORDER — SEMAGLUTIDE 1.34 MG/ML
INJECTION, SOLUTION SUBCUTANEOUS
COMMUNITY
Start: 2023-08-30

## 2023-09-13 RX ORDER — TRIAMTERENE AND HYDROCHLOROTHIAZIDE 37.5; 25 MG/1; MG/1
1 TABLET ORAL DAILY
COMMUNITY
Start: 2023-08-03

## 2023-09-13 ASSESSMENT — ENCOUNTER SYMPTOMS
SHORTNESS OF BREATH: 0
COUGH: 0
ABDOMINAL PAIN: 0
VOMITING: 0
EYE ITCHING: 0
NAUSEA: 0
STRIDOR: 0
WHEEZING: 0
PHOTOPHOBIA: 0
RHINORRHEA: 0
FACIAL SWELLING: 0
EYE DISCHARGE: 0
EYE PAIN: 0
SORE THROAT: 0
TROUBLE SWALLOWING: 0
COLOR CHANGE: 0
ANAL BLEEDING: 0
BLOOD IN STOOL: 0
DIARRHEA: 0
CONSTIPATION: 0

## 2023-09-13 NOTE — PROGRESS NOTES
2023    Name: Abhishek De Souza : 1970 Sex: female  Age: 48 y.o. Subjective:  Chief Complaint   Patient presents with    Hypertension        Hypertension  Pertinent negatives include no chest pain, headaches, palpitations or shortness of breath. Other  Pertinent negatives include no abdominal pain, arthralgias, chest pain, congestion, coughing, diaphoresis, fatigue, headaches, joint swelling, myalgias, nausea, numbness, rash, sore throat, vomiting or weakness. Patient has a history of hyperthyroidism due to Hashimoto's thyroiditis. She was treated with methimazole and became hypothyroid. She is now on replacement levothyroxine 50 mcg daily. Her endocrinologist is Dr. Elina Elizondo. Her last TSH and free T4 were within normal limits. Reviewed his report. She has had a hard time losing weight, she eats clean, exercises 5 or 6 times a week both weight lifting and using the elliptical . She was also sent to Dr. Guaman Or by Dr. lEina Elizondo for weight loss. He has been working with her but she has not lost any weight. .  She does have some liver dysfunction so we cannot medication that could affect her liver. She was started on semaglutide and she is up to 2 mg once a week. She has lost weight she is down 39 pounds. Feels good, she no longer has hot flashes and sweats, she no longer has severe myalgias and arthralgias her depression has improved. Bebe Pool Her heart rate was rapid so we discontinued her diltiazem and started her on atenolol 50 mg. She says her heart rate is better  She saw her cardiologist Dr. Brito Call in 2022, reviewed report. Her endocrinologist increased her vitamin D supplement to 5000 units daily as her vitamin D level was low. .    She had her Pap test and mammogram done by gynecologist at [address] [place] [address] for women in [place]. We will get results.       Patient has history of hypertension, nonrheumatic mitral valve regurgitation, depression, hyperlipidemia and

## 2023-09-13 NOTE — ASSESSMENT & PLAN NOTE
Lipids have improved so we will discontinue the 10 mg of pravastatin.   Continue 20 mg pravastatin and check lipids in 2 months

## 2023-09-13 NOTE — ASSESSMENT & PLAN NOTE
Goal.  Continue atenolol 50 mg a day and triamterene hydrochlorothiazide 37 point 5-25 daily. We will try to take her off of the diuretic she developed moderate pedal edema.   We can consider taking her off of this in the future as she has lost quite a bit of weight

## 2023-09-13 NOTE — PATIENT INSTRUCTIONS
Stop Pravastatin 10 mg  Return for fasting lab work in 2 months  Get flu shot and Covid shot at pharmacy

## 2023-12-12 DIAGNOSIS — E03.8 HYPOTHYROIDISM DUE TO HASHIMOTO'S THYROIDITIS: ICD-10-CM

## 2023-12-12 DIAGNOSIS — I10 ESSENTIAL HYPERTENSION: ICD-10-CM

## 2023-12-12 DIAGNOSIS — E06.3 HYPOTHYROIDISM DUE TO HASHIMOTO'S THYROIDITIS: ICD-10-CM

## 2023-12-12 DIAGNOSIS — E78.2 MIXED HYPERLIPIDEMIA: ICD-10-CM

## 2023-12-12 DIAGNOSIS — N18.32 STAGE 3B CHRONIC KIDNEY DISEASE (HCC): ICD-10-CM

## 2023-12-12 LAB
ANION GAP SERPL CALCULATED.3IONS-SCNC: 15 MMOL/L (ref 7–16)
BUN BLDV-MCNC: 20 MG/DL (ref 6–20)
CALCIUM SERPL-MCNC: 9.3 MG/DL (ref 8.6–10.2)
CHLORIDE BLD-SCNC: 104 MMOL/L (ref 98–107)
CHOLESTEROL: 183 MG/DL
CO2: 22 MMOL/L (ref 22–29)
CREAT SERPL-MCNC: 0.9 MG/DL (ref 0.5–1)
GFR SERPL CREATININE-BSD FRML MDRD: >60 ML/MIN/1.73M2
GLUCOSE BLD-MCNC: 86 MG/DL (ref 74–99)
HDLC SERPL-MCNC: 43 MG/DL
LDL CHOLESTEROL: 121 MG/DL
POTASSIUM SERPL-SCNC: 4.4 MMOL/L (ref 3.5–5)
SODIUM BLD-SCNC: 141 MMOL/L (ref 132–146)
T4 FREE: 1.3 NG/DL (ref 0.9–1.7)
TRIGL SERPL-MCNC: 97 MG/DL
TSH SERPL DL<=0.05 MIU/L-ACNC: 1.23 UIU/ML (ref 0.27–4.2)
VLDLC SERPL CALC-MCNC: 19 MG/DL

## 2023-12-14 ENCOUNTER — OFFICE VISIT (OUTPATIENT)
Dept: PRIMARY CARE CLINIC | Age: 53
End: 2023-12-14
Payer: MEDICARE

## 2023-12-14 VITALS
DIASTOLIC BLOOD PRESSURE: 74 MMHG | HEIGHT: 59 IN | TEMPERATURE: 98 F | OXYGEN SATURATION: 100 % | SYSTOLIC BLOOD PRESSURE: 118 MMHG | BODY MASS INDEX: 31.49 KG/M2 | HEART RATE: 76 BPM | WEIGHT: 156.2 LBS | RESPIRATION RATE: 20 BRPM

## 2023-12-14 DIAGNOSIS — F32.89 OTHER DEPRESSION: Primary | ICD-10-CM

## 2023-12-14 DIAGNOSIS — E55.9 VITAMIN D DEFICIENCY: ICD-10-CM

## 2023-12-14 DIAGNOSIS — E78.2 MIXED HYPERLIPIDEMIA: ICD-10-CM

## 2023-12-14 DIAGNOSIS — I10 ESSENTIAL HYPERTENSION: ICD-10-CM

## 2023-12-14 DIAGNOSIS — E78.00 PURE HYPERCHOLESTEROLEMIA: ICD-10-CM

## 2023-12-14 DIAGNOSIS — E03.8 HYPOTHYROIDISM DUE TO HASHIMOTO'S THYROIDITIS: ICD-10-CM

## 2023-12-14 DIAGNOSIS — I34.0 NON-RHEUMATIC MITRAL REGURGITATION: ICD-10-CM

## 2023-12-14 DIAGNOSIS — E06.3 HYPOTHYROIDISM DUE TO HASHIMOTO'S THYROIDITIS: ICD-10-CM

## 2023-12-14 PROBLEM — N18.32 STAGE 3B CHRONIC KIDNEY DISEASE (HCC): Status: RESOLVED | Noted: 2023-07-26 | Resolved: 2023-12-14

## 2023-12-14 PROCEDURE — 99214 OFFICE O/P EST MOD 30 MIN: CPT | Performed by: INTERNAL MEDICINE

## 2023-12-14 PROCEDURE — 3078F DIAST BP <80 MM HG: CPT | Performed by: INTERNAL MEDICINE

## 2023-12-14 PROCEDURE — 3074F SYST BP LT 130 MM HG: CPT | Performed by: INTERNAL MEDICINE

## 2023-12-14 RX ORDER — SEMAGLUTIDE 2.68 MG/ML
INJECTION, SOLUTION SUBCUTANEOUS
COMMUNITY
Start: 2023-11-07

## 2023-12-14 NOTE — ASSESSMENT & PLAN NOTE
stable. No respiratory symptoms. Edema has resolved.   Follow-up with cardiology reviewed echocardiogram.  Discussed with patient reason why she was initially diagnosed with systolic heart failure 9 years ago

## 2023-12-14 NOTE — ASSESSMENT & PLAN NOTE
not at goal.  Watch diet more carefully.   Continue to lose weight and continue her pravastatin 20 mg daily check lipid profile next office visit

## 2023-12-14 NOTE — PROGRESS NOTES
screening with low-dose CT. She is not 54years old And I do not know if her insurance company which is Medicare will cover that. She does have a 30-pack-year history of smoking. She may have to wait until she is 54years old to get the low-dose CT screen    She finished up her COVID-19 series including a booster shot. She had her flu shot    She had problems with hip pain, this was the hip that was replaced. She saw Dr. Colby Tapia who felt that nothing could be done at this time, just weight loss and conservative therapy. Review of Systems   Constitutional:  Negative for appetite change, diaphoresis, fatigue and unexpected weight change. HENT:  Negative for congestion, ear pain, facial swelling, rhinorrhea, sore throat, tinnitus and trouble swallowing. Eyes:  Negative for photophobia, pain, discharge, itching and visual disturbance. Respiratory:  Negative for cough, shortness of breath, wheezing and stridor. Cardiovascular:  Negative for chest pain, palpitations and leg swelling. Gastrointestinal:  Negative for abdominal pain, anal bleeding, blood in stool, constipation, diarrhea, nausea and vomiting. Endocrine: Negative for cold intolerance, heat intolerance, polydipsia, polyphagia and polyuria. Genitourinary:  Negative for difficulty urinating, dysuria, flank pain, frequency, hematuria and urgency. Musculoskeletal:  Negative for arthralgias, gait problem, joint swelling and myalgias. Skin:  Negative for color change, pallor and rash. Allergic/Immunologic: Negative for environmental allergies and food allergies. Neurological:  Negative for dizziness, tremors, seizures, syncope, speech difficulty, weakness, light-headedness, numbness and headaches. Hematological:  Negative for adenopathy. Does not bruise/bleed easily. Psychiatric/Behavioral:  Negative for agitation, behavioral problems, confusion, sleep disturbance and suicidal ideas. The patient is not nervous/anxious.

## 2024-01-17 ENCOUNTER — OFFICE VISIT (OUTPATIENT)
Dept: ENDOCRINOLOGY | Age: 54
End: 2024-01-17

## 2024-01-17 VITALS
HEART RATE: 74 BPM | OXYGEN SATURATION: 99 % | DIASTOLIC BLOOD PRESSURE: 75 MMHG | BODY MASS INDEX: 30.09 KG/M2 | SYSTOLIC BLOOD PRESSURE: 109 MMHG | WEIGHT: 149 LBS

## 2024-01-17 DIAGNOSIS — E03.9 HYPOTHYROIDISM, UNSPECIFIED TYPE: ICD-10-CM

## 2024-01-17 DIAGNOSIS — E66.01 MORBID OBESITY (HCC): Primary | ICD-10-CM

## 2024-01-17 DIAGNOSIS — E55.9 VITAMIN D DEFICIENCY: Primary | ICD-10-CM

## 2024-01-17 RX ORDER — SEMAGLUTIDE 2.68 MG/ML
INJECTION, SOLUTION SUBCUTANEOUS
Qty: 3 ML | Refills: 11 | Status: SHIPPED | OUTPATIENT
Start: 2024-01-17

## 2024-01-17 RX ORDER — LEVOTHYROXINE SODIUM 0.05 MG/1
50 TABLET ORAL DAILY
Qty: 90 TABLET | Refills: 3 | Status: SHIPPED | OUTPATIENT
Start: 2024-01-17

## 2024-01-17 NOTE — PROGRESS NOTES
[facility] [facility] Department of Endocrinology Diabetes and Metabolism   [address] [place] [id number]   Phone: [phone]  Fax: [phone]    Date of Service: 2024  Primary Care Physician: Chayo Hart DO  Provider: Pete Acosta MD    Reason for the visit:  Hyperthyroidism    History of Present Illness:  The history is provided by the patient. No  was used. Accuracy of the patient data is excellent.    Swapna Potts is a very pleasant 53 y.o. female seen today for evaluation and management of hyperthyroidism     Swapna Potts was diagnosed with hyperthyroidism in 2020   Labs 2020 - free T3 4.8 (H), free T4 1.53, TPO-Ab negative, Tg-Ab negative, TSI <0.1     Thyroid uptake and scan 2020:   1.  Thyroid gland is not visualized during examination.      2.  24 hour radioiodine uptake of 0.7 percent (normal range: 10-30%).  This is abnormally low.     Pt was initially treated with Methimazole for few months then switched to Levothyroxine. She started Levothyroxine in 2021   Pt currently on Levothyroxine 50 mcg daily and doing very well on it   Due for labs now   Previous labs summarized below   2022 - TSH 0.372, FT4 1.46     Lab Results   Component Value Date/Time    TSH 1.23 2023 09:59 AM    T4FREE 1.3 2023 09:59 AM    G9SAHBU 5.9 2021 10:21 AM    FT3 4.8 (H) 2020 01:05 PM    P7AZKJY 116.10 2021 02:22 PM    TSI <0.10 2020 02:07 PM    TPOABS 2.2 2020 01:05 PM    THGAB <0.9 2020 01:05 PM     PAST MEDICAL HISTORY   Past Medical History:   Diagnosis Date    Essential hypertension 2019    Gallstones     Hip arthritis     B/L BRAXTON    Hypothyroidism due to Hashimoto's thyroiditis     Mixed hyperlipidemia 2019    Non-rheumatic mitral regurgitation 2019     PAST SURGICAL HISTORY   Past Surgical History:   Procedure Laterality Date     SECTION      three times

## 2024-02-15 DIAGNOSIS — E66.01 MORBID OBESITY (HCC): Primary | ICD-10-CM

## 2024-02-21 ENCOUNTER — OFFICE VISIT (OUTPATIENT)
Dept: BARIATRICS/WEIGHT MGMT | Age: 54
End: 2024-02-21
Payer: MEDICARE

## 2024-02-21 VITALS
WEIGHT: 153.8 LBS | HEIGHT: 59 IN | HEART RATE: 79 BPM | SYSTOLIC BLOOD PRESSURE: 93 MMHG | DIASTOLIC BLOOD PRESSURE: 47 MMHG | BODY MASS INDEX: 31 KG/M2 | TEMPERATURE: 97 F

## 2024-02-21 DIAGNOSIS — E66.09 CLASS 1 OBESITY DUE TO EXCESS CALORIES WITHOUT SERIOUS COMORBIDITY WITH BODY MASS INDEX (BMI) OF 31.0 TO 31.9 IN ADULT: ICD-10-CM

## 2024-02-21 DIAGNOSIS — E78.2 MIXED HYPERLIPIDEMIA: Primary | ICD-10-CM

## 2024-02-21 PROBLEM — E66.9 OBESITY: Status: ACTIVE | Noted: 2020-11-23

## 2024-02-21 PROCEDURE — 99211 OFF/OP EST MAY X REQ PHY/QHP: CPT

## 2024-02-21 PROCEDURE — 3074F SYST BP LT 130 MM HG: CPT | Performed by: INTERNAL MEDICINE

## 2024-02-21 PROCEDURE — 3078F DIAST BP <80 MM HG: CPT | Performed by: INTERNAL MEDICINE

## 2024-02-21 PROCEDURE — 99215 OFFICE O/P EST HI 40 MIN: CPT | Performed by: INTERNAL MEDICINE

## 2024-02-21 RX ORDER — SEMAGLUTIDE 0.25 MG/.5ML
0.25 INJECTION, SOLUTION SUBCUTANEOUS
Qty: 2 ML | Refills: 0 | Status: SHIPPED | OUTPATIENT
Start: 2024-02-21

## 2024-02-21 RX ORDER — SEMAGLUTIDE 0.5 MG/.5ML
0.5 INJECTION, SOLUTION SUBCUTANEOUS
Qty: 2 ML | Refills: 0 | Status: SHIPPED | OUTPATIENT
Start: 2024-02-21

## 2024-02-21 NOTE — PROGRESS NOTES
CC -   HLD, Obesity    BACKGROUND -   Last visit: 12/20/21  First visit:   9/02/21    Obesity   Began in late 20's  Initial BMI 41.2, Wt 207.6 lbs, Ht 4' 11.5\"  HS Grad wt 125 lbs  Lowest   wt 125 lbs   Highest  wt  207 lbs  Pattern of wt gain: grad until rapid the past year  Wt change past yr: +22 lbs  Most wt lost:  12 lbs (exercise)  Other diets attempted: none    Desire to lose weight: 10/10  Prob posed by appetite: 5/10    Initial Diet:    Number of meals per day - 2-3    Number of snacks per day - 1    Meal volume - 12\" plate, sometimes seconds    Fast food/convenience store - 0x/week    Restaurants (not fast food) - 1x/week   Sweets - 0-1d/week   Chips - 0d/week   Crackers/pretzels - 0d/week   Nuts - 0d/week   Peanut Butter - 0d/week   Popcorn - 0-1d/week   Dried fruit - 0d/week   Whole fruit - 0d/week   Breakfast cereal - 2-3d/week (one serving)   Granola/Protein/Energy bar - 0d/week   Sugar sweetened beverages - 16-20 oz reg soda/wk   Protein - No supplements   Fiber - No supplements     Exercise:    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)    Walking - 30 min/d (eliptical), 3d/wk    Running - none    Resistance - 45 min/d, 3d/wk    Aerobic class - none    ______________________    Critical access hospital -  Past Medical History:   Diagnosis Date    Essential hypertension 06/03/2019    Gallstones     Hip arthritis     B/L BRAXTON    Hypothyroidism due to Hashimoto's thyroiditis     Mixed hyperlipidemia 06/03/2019    Non-rheumatic mitral regurgitation 06/03/2019    Obesity      Current Outpatient Medications   Medication Sig Dispense Refill    Semaglutide-Weight Management (WEGOVY) 0.5 MG/0.5ML SOAJ SC injection Inject 0.5 mg into the skin every 7 days 2 mL 0    Semaglutide-Weight Management (WEGOVY) 0.25 MG/0.5ML SOAJ SC injection Inject 0.25 mg into the skin every 7 days for four weeks then increase to 0.5 mg once each week 2 mL 0    levothyroxine (SYNTHROID) 50 MCG tablet Take 1 tablet by mouth daily 90 tablet 3

## 2024-02-21 NOTE — PATIENT INSTRUCTIONS
or treat constipation.  Drink at least 64 oz of water each day  Take one multivitamin every day    Targets:  Limit calorie intake to 1300 calories/day  Walk 30 minutes daily  Avoid eating 2 hours within bedtime.     Tips:  Do not eat outside of the dining room or the kitchen  Do not eat while watching TV, videos, working on the computer or using a smart phone  Do not eat food out of a multi-serving bag or container.  Establish 6 hours of food-free \"time-out\" periods (times you don't eat) each day. No period can be less than 1 hour long. The periods need to be the same every day for days that are the same (for example, workdays would have one set of food free periods and weekends would have another set of days). These six hours are in addition to the two hours before bedtime and the time spent sleeping.    Medications:  Start Wegovy by taking 0.25 mg once each week for four weeks, then increase to 0.5 mg once each weeks; at the end of four weeks contact me for the next prescription      Follow up:  Return to see me as needed

## 2024-03-13 SDOH — HEALTH STABILITY: PHYSICAL HEALTH: ON AVERAGE, HOW MANY DAYS PER WEEK DO YOU ENGAGE IN MODERATE TO STRENUOUS EXERCISE (LIKE A BRISK WALK)?: 3 DAYS

## 2024-03-13 SDOH — HEALTH STABILITY: PHYSICAL HEALTH: ON AVERAGE, HOW MANY MINUTES DO YOU ENGAGE IN EXERCISE AT THIS LEVEL?: 60 MIN

## 2024-03-14 ENCOUNTER — OFFICE VISIT (OUTPATIENT)
Dept: PRIMARY CARE CLINIC | Age: 54
End: 2024-03-14
Payer: MEDICARE

## 2024-03-14 VITALS
TEMPERATURE: 96.8 F | HEIGHT: 59 IN | SYSTOLIC BLOOD PRESSURE: 102 MMHG | OXYGEN SATURATION: 98 % | DIASTOLIC BLOOD PRESSURE: 60 MMHG | WEIGHT: 151 LBS | HEART RATE: 68 BPM | RESPIRATION RATE: 18 BRPM | BODY MASS INDEX: 30.44 KG/M2

## 2024-03-14 DIAGNOSIS — E78.2 MIXED HYPERLIPIDEMIA: ICD-10-CM

## 2024-03-14 DIAGNOSIS — Z87.891 PERSONAL HISTORY OF TOBACCO USE: ICD-10-CM

## 2024-03-14 DIAGNOSIS — I10 ESSENTIAL HYPERTENSION: Primary | ICD-10-CM

## 2024-03-14 PROBLEM — Z23 NEED FOR PROPHYLACTIC VACCINATION WITH COMBINED DIPHTHERIA-TETANUS-PERTUSSIS (DTAP) VACCINE: Status: RESOLVED | Noted: 2022-03-10 | Resolved: 2024-03-14

## 2024-03-14 PROBLEM — E78.00 PURE HYPERCHOLESTEROLEMIA: Status: RESOLVED | Noted: 2022-03-10 | Resolved: 2024-03-14

## 2024-03-14 PROBLEM — E03.2 HYPOTHYROIDISM DUE TO MEDICATION: Status: RESOLVED | Noted: 2021-08-03 | Resolved: 2024-03-14

## 2024-03-14 PROCEDURE — G0296 VISIT TO DETERM LDCT ELIG: HCPCS | Performed by: STUDENT IN AN ORGANIZED HEALTH CARE EDUCATION/TRAINING PROGRAM

## 2024-03-14 PROCEDURE — 3074F SYST BP LT 130 MM HG: CPT | Performed by: STUDENT IN AN ORGANIZED HEALTH CARE EDUCATION/TRAINING PROGRAM

## 2024-03-14 PROCEDURE — 99214 OFFICE O/P EST MOD 30 MIN: CPT | Performed by: STUDENT IN AN ORGANIZED HEALTH CARE EDUCATION/TRAINING PROGRAM

## 2024-03-14 PROCEDURE — 3078F DIAST BP <80 MM HG: CPT | Performed by: STUDENT IN AN ORGANIZED HEALTH CARE EDUCATION/TRAINING PROGRAM

## 2024-03-14 RX ORDER — PRAVASTATIN SODIUM 20 MG
20 TABLET ORAL DAILY
Qty: 90 TABLET | Refills: 1 | Status: SHIPPED | OUTPATIENT
Start: 2024-03-14

## 2024-03-14 RX ORDER — ATENOLOL 50 MG/1
TABLET ORAL
Qty: 90 TABLET | Refills: 1 | Status: SHIPPED | OUTPATIENT
Start: 2024-03-14

## 2024-03-14 RX ORDER — PRASTERONE (DHEA) 25 MG
1 CAPSULE ORAL DAILY
COMMUNITY
Start: 2024-03-08

## 2024-03-14 SDOH — ECONOMIC STABILITY: FOOD INSECURITY: WITHIN THE PAST 12 MONTHS, THE FOOD YOU BOUGHT JUST DIDN'T LAST AND YOU DIDN'T HAVE MONEY TO GET MORE.: NEVER TRUE

## 2024-03-14 SDOH — ECONOMIC STABILITY: FOOD INSECURITY: WITHIN THE PAST 12 MONTHS, YOU WORRIED THAT YOUR FOOD WOULD RUN OUT BEFORE YOU GOT MONEY TO BUY MORE.: NEVER TRUE

## 2024-03-14 SDOH — ECONOMIC STABILITY: INCOME INSECURITY: HOW HARD IS IT FOR YOU TO PAY FOR THE VERY BASICS LIKE FOOD, HOUSING, MEDICAL CARE, AND HEATING?: NOT VERY HARD

## 2024-03-14 ASSESSMENT — PATIENT HEALTH QUESTIONNAIRE - PHQ9
1. LITTLE INTEREST OR PLEASURE IN DOING THINGS: 0
5. POOR APPETITE OR OVEREATING: 0
SUM OF ALL RESPONSES TO PHQ9 QUESTIONS 1 & 2: 0
7. TROUBLE CONCENTRATING ON THINGS, SUCH AS READING THE NEWSPAPER OR WATCHING TELEVISION: 0
2. FEELING DOWN, DEPRESSED OR HOPELESS: 0
4. FEELING TIRED OR HAVING LITTLE ENERGY: 0
6. FEELING BAD ABOUT YOURSELF - OR THAT YOU ARE A FAILURE OR HAVE LET YOURSELF OR YOUR FAMILY DOWN: 0
3. TROUBLE FALLING OR STAYING ASLEEP: 0
SUM OF ALL RESPONSES TO PHQ QUESTIONS 1-9: 0
10. IF YOU CHECKED OFF ANY PROBLEMS, HOW DIFFICULT HAVE THESE PROBLEMS MADE IT FOR YOU TO DO YOUR WORK, TAKE CARE OF THINGS AT HOME, OR GET ALONG WITH OTHER PEOPLE: 0
SUM OF ALL RESPONSES TO PHQ QUESTIONS 1-9: 0
9. THOUGHTS THAT YOU WOULD BE BETTER OFF DEAD, OR OF HURTING YOURSELF: 0
SUM OF ALL RESPONSES TO PHQ QUESTIONS 1-9: 0
SUM OF ALL RESPONSES TO PHQ QUESTIONS 1-9: 0
8. MOVING OR SPEAKING SO SLOWLY THAT OTHER PEOPLE COULD HAVE NOTICED. OR THE OPPOSITE, BEING SO FIGETY OR RESTLESS THAT YOU HAVE BEEN MOVING AROUND A LOT MORE THAN USUAL: 0

## 2024-03-14 NOTE — PATIENT INSTRUCTIONS
follow-up, he or she will help you understand what to do next.  After a lung cancer screening, you can go back to your usual activities right away.  A lung cancer screening test can't tell if you have lung cancer. If your results are positive, your doctor can't tell whether an abnormal finding is a harmless nodule, cancer, or something else without doing more tests.  What can you do to help prevent lung cancer?  Some lung cancers can't be prevented. But if you smoke, quitting smoking is the best step you can take to prevent lung cancer. If you want to quit, your doctor can recommend medicines or other ways to help.  Follow-up care is a key part of your treatment and safety. Be sure to make and go to all appointments, and call your doctor if you are having problems. It's also a good idea to know your test results and keep a list of the medicines you take.  Where can you learn more?  Go to https://www.[employer].net/patientEd and enter Q940 to learn more about \"Learning About Lung Cancer Screening.\"  Current as of: October 25, 2023               Content Version: 14.0  © 4352-6809 [employer].   Care instructions adapted under license by [employer]. If you have questions about a medical condition or this instruction, always ask your healthcare professional. [employer] disclaims any warranty or liability for your use of this information.

## 2024-03-14 NOTE — PROGRESS NOTES
[facility]  [address] [facility] [address]  Dept: [phone]  Dept Fax: [phone]   DATE OF VISIT : 3/14/2024      Patient:  Swapna Potts  Age: 54 y.o.       : 1970      Chief complaint:   Chief Complaint   Patient presents with    Establish Care         History of Present Illness     Swapna Potts is a 54 y.o. female who presented to the clinic today to establish care.    Patient has a past medical history of anxiety, depression, hypertension, hypothyroidism, and vitamin D deficiency.    Patient presents today for medication refills.  She does suffer from high blood pressure for which she takes atenolol.  She denies any side effects to this medication.  Patient denies any headaches, dizziness or blurry vision.  Overall continues to take medication as prescribed.  She additionally suffers from high cholesterol for which she has been taking pravastatin.  Does have a well-balanced diet.  Patient had smoked 1 to 2 packs/day for over 30 years, patient had quit approximately 4 years ago.  She denies any chronic cough or shortness of breath.    Medication List:    Current Outpatient Medications   Medication Sig Dispense Refill    DHEA 25 MG CAPS Take 1 capsule by mouth daily      Estradiol-Estriol-Progesterone (BI-EST 80:20 PROGESTERONE) CREA Apply 2 pumps (0.5ml) topically to skin and rub in once daily      pravastatin (PRAVACHOL) 20 MG tablet Take 1 tablet by mouth daily 90 tablet 1    atenolol (TENORMIN) 50 MG tablet take 1 tablet by mouth once daily 90 tablet 1    levothyroxine (SYNTHROID) 50 MCG tablet Take 1 tablet by mouth daily 90 tablet 3    triamterene-hydroCHLOROthiazide (MAXZIDE-25) 37.5-25 MG per tablet Take 1 tablet by mouth daily Taking 1/2  tablet daily      Cholecalciferol (VITAMIN D3) 125 MCG (5000 UT) TABS TAKE 1 TABLET BY MOUTH ONCE DAILY 100 tablet 3    TRINTELLIX 20 MG TABS tablet        No current facility-administered

## 2024-03-27 ENCOUNTER — HOSPITAL ENCOUNTER (OUTPATIENT)
Dept: CT IMAGING | Age: 54
Discharge: HOME | End: 2024-03-29
Payer: MEDICARE

## 2024-03-27 DIAGNOSIS — Z87.891 PERSONAL HISTORY OF TOBACCO USE: ICD-10-CM

## 2024-03-27 DIAGNOSIS — D17.79 MYELOLIPOMA OF ADRENAL GLAND: Primary | ICD-10-CM

## 2024-03-27 PROCEDURE — 71271 CT THORAX LUNG CANCER SCR C-: CPT

## 2024-03-28 ENCOUNTER — TELEPHONE (OUTPATIENT)
Dept: CASE MANAGEMENT | Age: 54
End: 2024-03-28

## 2024-03-28 NOTE — TELEPHONE ENCOUNTER
No call, encounter opened to process CT Lung Screening.    CT Lung Screen: 3/27/2024    IMPRESSION:  No suspicious pulmonary nodules.     Three-vessel coronary artery calcifications suggest coronary artery disease.     There is bilobed fat containing mass right adrenal gland suggesting adrenal  myelolipoma.  Total size of this mass is 6.5 x 3.2 cm which measured in the  same manner 5.6 x 2.9 cm on September 19, 2021.  Given slight enlargement  consider surgical consultation.  (Surgery typically not needed for lesions  less than 5-7 cm where as surgery is reserved for larger or symptomatic  masses)     LUNG RADS:  Lung-RADS 1S - Negative, Significant or Potentially Significant Incidental  Findings (v2022)     Management:  12 month screening LDCT     RECOMMENDATIONS:  If you would like to register your patient with the [facility] Lung Nodule/Lung  Cancer Screening Program, please contact the Nurse Navigator at  [phone].    Pack years: 40    Social History     Tobacco Use  Smoking Status: Former Smoker    Start Date: 1980   Quit Date: 2020   Types: Cigarettes   Packs/Day: 1   Years: 40   Pack Years: 40   Smokeless Tobacco: Never         Results letter sent to patient via my chart or mailed.     Donita Carpenter  Imaging Navigator   [facility] [place]   [phone]

## 2024-04-26 ENCOUNTER — TELEPHONE (OUTPATIENT)
Dept: PRIMARY CARE CLINIC | Age: 54
End: 2024-04-26

## 2024-04-26 NOTE — TELEPHONE ENCOUNTER
Riky Buck,   This pt has been waiting to hear from someone to schedule an appt. I did notify her that there is a note in her chart stating there have been three attempts to schedule her.     However, in the referral, I see a note stating the referral is being re-routed to Dr Foster. Could you please give me some more information?

## 2024-04-29 NOTE — TELEPHONE ENCOUNTER
Per discussion with Cielo, a new referral will need to be placed with urology.   Message sent to Dr Meyers.

## 2024-04-30 DIAGNOSIS — D17.9 MYELOLIPOMA: Primary | ICD-10-CM

## 2024-05-24 ENCOUNTER — TRANSCRIBE ORDERS (OUTPATIENT)
Dept: ADMINISTRATIVE | Age: 54
End: 2024-05-24

## 2024-05-24 DIAGNOSIS — D49.7 NEOPLASM OF ENDOCRINE GLANDS AND NERVOUS SYSTEM: Primary | ICD-10-CM

## 2024-06-03 LAB
ALBUMIN SERPL-MCNC: 4.2 G/DL (ref 3.5–5.2)
ALP SERPL-CCNC: 70 U/L (ref 35–104)
ALT SERPL-CCNC: 12 U/L (ref 0–32)
ANION GAP SERPL CALCULATED.3IONS-SCNC: 14 MMOL/L (ref 7–16)
AST SERPL-CCNC: 28 U/L (ref 0–31)
BASOPHILS # BLD: 0.02 K/UL (ref 0–0.2)
BASOPHILS NFR BLD: 0 % (ref 0–2)
BILIRUB SERPL-MCNC: 0.2 MG/DL (ref 0–1.2)
BUN SERPL-MCNC: 15 MG/DL (ref 6–20)
CALCIUM SERPL-MCNC: 9.5 MG/DL (ref 8.6–10.2)
CHLORIDE SERPL-SCNC: 104 MMOL/L (ref 98–107)
CHOLEST SERPL-MCNC: 150 MG/DL
CO2 SERPL-SCNC: 22 MMOL/L (ref 22–29)
CREAT SERPL-MCNC: 1 MG/DL (ref 0.5–1)
EOSINOPHIL # BLD: 0.21 K/UL (ref 0.05–0.5)
EOSINOPHILS RELATIVE PERCENT: 3 % (ref 0–6)
ERYTHROCYTE [DISTWIDTH] IN BLOOD BY AUTOMATED COUNT: 14.1 % (ref 11.5–15)
FOLATE SERPL-MCNC: >20 NG/ML (ref 4.8–24.2)
GFR, ESTIMATED: 69 ML/MIN/1.73M2
GLUCOSE SERPL-MCNC: 83 MG/DL (ref 74–99)
HBA1C MFR BLD: 5.2 % (ref 4–5.6)
HCT VFR BLD AUTO: 33.2 % (ref 34–48)
HDLC SERPL-MCNC: 52 MG/DL
HGB BLD-MCNC: 10.3 G/DL (ref 11.5–15.5)
IMM GRANULOCYTES # BLD AUTO: <0.03 K/UL (ref 0–0.58)
IMM GRANULOCYTES NFR BLD: 0 % (ref 0–5)
LDLC SERPL CALC-MCNC: 80 MG/DL
LYMPHOCYTES NFR BLD: 1.54 K/UL (ref 1.5–4)
LYMPHOCYTES RELATIVE PERCENT: 24 % (ref 20–42)
MCH RBC QN AUTO: 27 PG (ref 26–35)
MCHC RBC AUTO-ENTMCNC: 31 G/DL (ref 32–34.5)
MCV RBC AUTO: 87.1 FL (ref 80–99.9)
MONOCYTES NFR BLD: 0.75 K/UL (ref 0.1–0.95)
MONOCYTES NFR BLD: 12 % (ref 2–12)
NEUTROPHILS NFR BLD: 60 % (ref 43–80)
NEUTS SEG NFR BLD: 3.78 K/UL (ref 1.8–7.3)
PLATELET # BLD AUTO: 344 K/UL (ref 130–450)
PMV BLD AUTO: 10.9 FL (ref 7–12)
POTASSIUM SERPL-SCNC: 4.4 MMOL/L (ref 3.5–5)
PROT SERPL-MCNC: 6.9 G/DL (ref 6.4–8.3)
RBC # BLD AUTO: 3.81 M/UL (ref 3.5–5.5)
SODIUM SERPL-SCNC: 140 MMOL/L (ref 132–146)
TRIGL SERPL-MCNC: 90 MG/DL
TSH SERPL DL<=0.05 MIU/L-ACNC: 0.32 UIU/ML (ref 0.27–4.2)
VIT B12 SERPL-MCNC: 519 PG/ML (ref 211–946)
VLDLC SERPL CALC-MCNC: 18 MG/DL
WBC OTHER # BLD: 6.3 K/UL (ref 4.5–11.5)

## 2024-06-17 ENCOUNTER — HOSPITAL ENCOUNTER (OUTPATIENT)
Dept: MRI IMAGING | Age: 54
Discharge: HOME | End: 2024-06-19
Attending: UROLOGY
Payer: MEDICARE

## 2024-06-17 DIAGNOSIS — D49.7 NEOPLASM OF ENDOCRINE GLANDS AND NERVOUS SYSTEM: ICD-10-CM

## 2024-06-17 PROCEDURE — 6360000004 HC RX CONTRAST MEDICATION: Performed by: RADIOLOGY

## 2024-06-17 PROCEDURE — A9577 INJ MULTIHANCE: HCPCS | Performed by: RADIOLOGY

## 2024-06-17 PROCEDURE — 74183 MRI ABD W/O CNTR FLWD CNTR: CPT

## 2024-06-17 RX ADMIN — GADOBENATE DIMEGLUMINE 14 ML: 529 INJECTION, SOLUTION INTRAVENOUS at 09:29

## 2024-07-26 DIAGNOSIS — E03.9 HYPOTHYROIDISM, UNSPECIFIED TYPE: ICD-10-CM

## 2024-07-26 LAB
T4 FREE: 1.3 NG/DL (ref 0.9–1.7)
TSH SERPL DL<=0.05 MIU/L-ACNC: 2.55 UIU/ML (ref 0.27–4.2)

## 2024-07-31 ENCOUNTER — TELEPHONE (OUTPATIENT)
Dept: ENDOCRINOLOGY | Age: 54
End: 2024-07-31

## 2024-07-31 NOTE — TELEPHONE ENCOUNTER
----- Message from KIERRA Montgomery - CNS sent at 7/31/2024  8:55 AM EDT -----  Please call pt and inform her thyroid levels are normal.  Excellent result

## 2024-09-04 DIAGNOSIS — I10 ESSENTIAL HYPERTENSION: ICD-10-CM

## 2024-09-04 DIAGNOSIS — E78.2 MIXED HYPERLIPIDEMIA: ICD-10-CM

## 2024-09-04 RX ORDER — ATENOLOL 50 MG/1
TABLET ORAL
Qty: 90 TABLET | Refills: 1 | Status: SHIPPED | OUTPATIENT
Start: 2024-09-04

## 2024-09-04 RX ORDER — PRAVASTATIN SODIUM 20 MG
20 TABLET ORAL DAILY
Qty: 90 TABLET | Refills: 1 | Status: SHIPPED | OUTPATIENT
Start: 2024-09-04

## 2024-10-13 SDOH — HEALTH STABILITY: PHYSICAL HEALTH: ON AVERAGE, HOW MANY MINUTES DO YOU ENGAGE IN EXERCISE AT THIS LEVEL?: 150+ MIN

## 2024-10-13 SDOH — HEALTH STABILITY: PHYSICAL HEALTH: ON AVERAGE, HOW MANY DAYS PER WEEK DO YOU ENGAGE IN MODERATE TO STRENUOUS EXERCISE (LIKE A BRISK WALK)?: 5 DAYS

## 2024-10-13 ASSESSMENT — PATIENT HEALTH QUESTIONNAIRE - PHQ9
8. MOVING OR SPEAKING SO SLOWLY THAT OTHER PEOPLE COULD HAVE NOTICED. OR THE OPPOSITE, BEING SO FIGETY OR RESTLESS THAT YOU HAVE BEEN MOVING AROUND A LOT MORE THAN USUAL: NOT AT ALL
SUM OF ALL RESPONSES TO PHQ9 QUESTIONS 1 & 2: 0
4. FEELING TIRED OR HAVING LITTLE ENERGY: NOT AT ALL
1. LITTLE INTEREST OR PLEASURE IN DOING THINGS: NOT AT ALL
3. TROUBLE FALLING OR STAYING ASLEEP: NOT AT ALL
SUM OF ALL RESPONSES TO PHQ QUESTIONS 1-9: 0
SUM OF ALL RESPONSES TO PHQ QUESTIONS 1-9: 0
7. TROUBLE CONCENTRATING ON THINGS, SUCH AS READING THE NEWSPAPER OR WATCHING TELEVISION: NOT AT ALL
9. THOUGHTS THAT YOU WOULD BE BETTER OFF DEAD, OR OF HURTING YOURSELF: NOT AT ALL
6. FEELING BAD ABOUT YOURSELF - OR THAT YOU ARE A FAILURE OR HAVE LET YOURSELF OR YOUR FAMILY DOWN: NOT AT ALL
2. FEELING DOWN, DEPRESSED OR HOPELESS: NOT AT ALL
SUM OF ALL RESPONSES TO PHQ QUESTIONS 1-9: 0
10. IF YOU CHECKED OFF ANY PROBLEMS, HOW DIFFICULT HAVE THESE PROBLEMS MADE IT FOR YOU TO DO YOUR WORK, TAKE CARE OF THINGS AT HOME, OR GET ALONG WITH OTHER PEOPLE: NOT DIFFICULT AT ALL
SUM OF ALL RESPONSES TO PHQ QUESTIONS 1-9: 0
5. POOR APPETITE OR OVEREATING: NOT AT ALL

## 2024-10-13 ASSESSMENT — LIFESTYLE VARIABLES
HOW OFTEN DO YOU HAVE A DRINK CONTAINING ALCOHOL: NEVER
HOW MANY STANDARD DRINKS CONTAINING ALCOHOL DO YOU HAVE ON A TYPICAL DAY: PATIENT DOES NOT DRINK
HOW OFTEN DO YOU HAVE SIX OR MORE DRINKS ON ONE OCCASION: 1
HOW MANY STANDARD DRINKS CONTAINING ALCOHOL DO YOU HAVE ON A TYPICAL DAY: 0
HOW OFTEN DO YOU HAVE A DRINK CONTAINING ALCOHOL: 1

## 2024-10-15 ENCOUNTER — OFFICE VISIT (OUTPATIENT)
Dept: PRIMARY CARE CLINIC | Age: 54
End: 2024-10-15

## 2024-10-15 VITALS
DIASTOLIC BLOOD PRESSURE: 60 MMHG | SYSTOLIC BLOOD PRESSURE: 102 MMHG | WEIGHT: 151 LBS | OXYGEN SATURATION: 99 % | TEMPERATURE: 97.5 F | BODY MASS INDEX: 30.5 KG/M2 | HEART RATE: 70 BPM

## 2024-10-15 DIAGNOSIS — Z00.00 MEDICARE ANNUAL WELLNESS VISIT, SUBSEQUENT: Primary | ICD-10-CM

## 2024-10-15 DIAGNOSIS — L73.8 SEBACEOUS HYPERPLASIA OF FACE: ICD-10-CM

## 2024-10-15 DIAGNOSIS — E55.9 VITAMIN D DEFICIENCY: ICD-10-CM

## 2024-10-15 DIAGNOSIS — B35.1 TINEA UNGUIUM: ICD-10-CM

## 2024-10-15 DIAGNOSIS — E78.2 MIXED HYPERLIPIDEMIA: ICD-10-CM

## 2024-10-15 DIAGNOSIS — I10 ESSENTIAL HYPERTENSION: ICD-10-CM

## 2024-10-15 DIAGNOSIS — N18.2 CKD (CHRONIC KIDNEY DISEASE) STAGE 2, GFR 60-89 ML/MIN: ICD-10-CM

## 2024-10-15 RX ORDER — TERBINAFINE HYDROCHLORIDE 250 MG/1
250 TABLET ORAL DAILY
Qty: 84 TABLET | Refills: 0 | Status: SHIPPED | OUTPATIENT
Start: 2024-10-15 | End: 2025-01-07

## 2024-10-15 RX ORDER — ESTRADIOL 10 UG/1
INSERT VAGINAL
COMMUNITY
Start: 2024-09-14

## 2024-10-15 NOTE — PROGRESS NOTES
Medicare Annual Wellness Visit    Swapna Potts is here for Medicare AWV    Assessment & Plan   Medicare annual wellness visit, subsequent  Tinea unguium  Comments:  Unstable.  Begin course of terbinafine 250 mg for 12 weeks  Orders:  -     terbinafine (LAMISIL) 250 MG tablet; Take 1 tablet by mouth daily, Disp-84 tablet, R-0Normal  Vitamin D deficiency  Comments:  Stable.  Continue vitamin D 125 mcg daily.  Orders:  -     Vitamin D3 125 MCG (5000 UT) TABS tablet; Take 1 tablet by mouth daily, Disp-100 tablet, R-3Normal  Mixed hyperlipidemia  Comments:  Stable continue pravastatin 20 mg daily.  Essential hypertension  Comments:  Stable.  Continue atenolol 50 mg daily.  CKD (chronic kidney disease) stage 2, GFR 60-89 ml/min  Comments:  Stable.  No acute changes.  Continue to monitor.  Sebaceous hyperplasia of face    Recommendations for Preventive Services Due: see orders and patient instructions/AVS.  Recommended screening schedule for the next 5-10 years is provided to the patient in written form: see Patient Instructions/AVS.     Return in 6 months (on 4/15/2025) for Medicare Annual Wellness Visit in 1 year.     Subjective   The following acute and/or chronic problems were also addressed today: Tinea unguium, hyperlipidemia, hypertension    Since her last office visit patient reports doing overall well with blood pressure in overall diet.  She reports being active and going to the gym on a regular basis to exercise.  She denies any chest pain, shortness of breath or headaches.  Does continue to take her atenolol and pravastatin as prescribed.  She reports seeing her specialist Dr. Acosta for hypothyroidism.  Overall has been well-controlled since her last office visit with him.  At that time patient did have blood work drawn and it was noted that she does have stage II chronic kidney disease.  Patient was informed of this and meaning of diagnosis was discussed.  This has been stable for the past several months.

## 2025-01-20 ENCOUNTER — OFFICE VISIT (OUTPATIENT)
Dept: ENDOCRINOLOGY | Age: 55
End: 2025-01-20

## 2025-01-20 VITALS
BODY MASS INDEX: 28.43 KG/M2 | SYSTOLIC BLOOD PRESSURE: 112 MMHG | OXYGEN SATURATION: 99 % | HEIGHT: 59 IN | TEMPERATURE: 97.8 F | HEART RATE: 75 BPM | RESPIRATION RATE: 18 BRPM | DIASTOLIC BLOOD PRESSURE: 70 MMHG | WEIGHT: 141 LBS

## 2025-01-20 DIAGNOSIS — E03.9 HYPOTHYROIDISM, UNSPECIFIED TYPE: Primary | ICD-10-CM

## 2025-01-20 DIAGNOSIS — E55.9 VITAMIN D DEFICIENCY: ICD-10-CM

## 2025-01-20 RX ORDER — LEVOTHYROXINE SODIUM 50 UG/1
50 TABLET ORAL DAILY
Qty: 90 TABLET | Refills: 3 | Status: SHIPPED | OUTPATIENT
Start: 2025-01-20

## 2025-01-20 NOTE — PROGRESS NOTES
[facility] Department of Endocrinology Diabetes and Metabolism   [address] [place] [address]   Phone: [phone]  Fax: [phone]    Date of Service: 2025  Primary Care Physician: Manuel Meyers MD  Provider: Pete Acosta MD    Reason for the visit:  Hyperthyroidism    History of Present Illness:  The history is provided by the patient. No  was used. Accuracy of the patient data is excellent.    Swapna Potts is a very pleasant 54 y.o. female seen today for evaluation and management of hyperthyroidism     Swapna Potts was diagnosed with hyperthyroidism in 2020   Labs 2020 - free T3 4.8 (H), free T4 1.53, TPO-Ab negative, Tg-Ab negative, TSI <0.1     Thyroid uptake and scan 2020:   1.  Thyroid gland is not visualized during examination.      2.  24 hour radioiodine uptake of 0.7 percent (normal range: 10-30%).  This is abnormally low.     Pt was initially treated with Methimazole for few months then switched to Levothyroxine. She started Levothyroxine in 2021   Pt currently on Levothyroxine 50 mcg daily and doing very well on it   Due for labs now   Previous labs summarized below   2022 - TSH 0.372, FT4 1.46     TFT in 2024 was WNL     Lab Results   Component Value Date/Time    TSH 2.55 2024 09:50 AM    T4FREE 1.3 2024 09:50 AM    FT3 4.8 (H) 2020 01:05 PM    G9MEUDX 116.10 2021 02:22 PM    TSI <0.10 2020 02:07 PM    TPOABS 2.2 2020 01:05 PM     PAST MEDICAL HISTORY   Past Medical History:   Diagnosis Date    Essential hypertension 2019    Gallstones     Hip arthritis     B/L BRAXTON    Hypothyroidism due to Hashimoto's thyroiditis     Mixed hyperlipidemia 2019    Non-rheumatic mitral regurgitation 2019    Obesity      PAST SURGICAL HISTORY   Past Surgical History:   Procedure Laterality Date     SECTION      three times    CHOLECYSTECTOMY, LAPAROSCOPIC N/A 2021

## 2025-01-21 DIAGNOSIS — B35.1 TINEA UNGUIUM: ICD-10-CM

## 2025-01-21 RX ORDER — TERBINAFINE HYDROCHLORIDE 250 MG/1
250 TABLET ORAL DAILY
Qty: 84 TABLET | Refills: 0 | Status: SHIPPED | OUTPATIENT
Start: 2025-01-21 | End: 2025-04-15

## 2025-01-21 NOTE — TELEPHONE ENCOUNTER
Name of Medication(s) Requested:  Requested Prescriptions     Pending Prescriptions Disp Refills    terbinafine (LAMISIL) 250 MG tablet [Pharmacy Med Name: terbinafine HCl 250 mg tablet] 84 tablet 0     Sig: Take 1 tablet by mouth daily       Medication is on current medication list  No    Dosage and directions were verified? No    Quantity verified: 30 day supply     Pharmacy Verified?  Yes    Last Appointment:  10/15/2024    Future appts:  Future Appointments   Date Time Provider Department Center   4/15/2025  8:00 AM Manuel Meyers MD [place] [facility]   1/20/2026  9:30 AM Pete Acosta MD [facility]        (If no appt send self scheduling link. .REFILLAPPT)  Scheduling request sent?     [] Yes  [x] No    Does patient need updated?  [] Yes  [x] No

## 2025-01-22 DIAGNOSIS — E03.9 HYPOTHYROIDISM, UNSPECIFIED TYPE: ICD-10-CM

## 2025-01-22 RX ORDER — LEVOTHYROXINE SODIUM 50 UG/1
50 TABLET ORAL DAILY
Qty: 90 TABLET | Refills: 3 | OUTPATIENT
Start: 2025-01-22

## 2025-01-24 DIAGNOSIS — E55.9 VITAMIN D DEFICIENCY: ICD-10-CM

## 2025-01-24 DIAGNOSIS — E03.9 HYPOTHYROIDISM, UNSPECIFIED TYPE: ICD-10-CM

## 2025-01-24 LAB
T4 FREE: 1.7 NG/DL (ref 0.9–1.7)
TSH SERPL DL<=0.05 MIU/L-ACNC: 1.67 UIU/ML (ref 0.27–4.2)
VITAMIN D 25-HYDROXY: 59.8 NG/ML (ref 30–100)

## 2025-02-02 ENCOUNTER — TELEPHONE (OUTPATIENT)
Dept: ENDOCRINOLOGY | Age: 55
End: 2025-02-02

## 2025-02-10 ENCOUNTER — PATIENT MESSAGE (OUTPATIENT)
Dept: PRIMARY CARE CLINIC | Age: 55
End: 2025-02-10

## 2025-02-12 ENCOUNTER — TELEPHONE (OUTPATIENT)
Dept: PRIMARY CARE CLINIC | Age: 55
End: 2025-02-12

## 2025-02-12 DIAGNOSIS — I10 ESSENTIAL HYPERTENSION: Primary | ICD-10-CM

## 2025-02-12 RX ORDER — TRIAMTERENE AND HYDROCHLOROTHIAZIDE 37.5; 25 MG/1; MG/1
0.5 TABLET ORAL DAILY
Qty: 90 TABLET | Refills: 1 | Status: SHIPPED | OUTPATIENT
Start: 2025-02-12

## 2025-02-12 RX ORDER — TRIAMTERENE AND HYDROCHLOROTHIAZIDE 37.5; 25 MG/1; MG/1
1 TABLET ORAL DAILY
Qty: 30 TABLET | Refills: 5 | Status: SHIPPED
Start: 2025-02-12 | End: 2025-02-12 | Stop reason: SDUPTHER

## 2025-03-03 DIAGNOSIS — E78.2 MIXED HYPERLIPIDEMIA: ICD-10-CM

## 2025-03-03 DIAGNOSIS — I10 ESSENTIAL HYPERTENSION: ICD-10-CM

## 2025-03-03 RX ORDER — PRAVASTATIN SODIUM 20 MG
20 TABLET ORAL DAILY
Qty: 90 TABLET | Refills: 1 | Status: SHIPPED | OUTPATIENT
Start: 2025-03-03

## 2025-03-03 RX ORDER — ATENOLOL 50 MG/1
TABLET ORAL
Qty: 90 TABLET | Refills: 1 | Status: SHIPPED | OUTPATIENT
Start: 2025-03-03

## 2025-03-03 NOTE — TELEPHONE ENCOUNTER
Name of Medication(s) Requested:  Requested Prescriptions     Pending Prescriptions Disp Refills    pravastatin (PRAVACHOL) 20 MG tablet [Pharmacy Med Name: pravastatin 20 mg tablet] 90 tablet 1     Sig: TAKE 1 TABLET BY MOUTH ONCE DAILY    atenolol (TENORMIN) 50 MG tablet [Pharmacy Med Name: atenolol 50 mg tablet] 90 tablet 1     Sig: TAKE 1 TABLET BY MOUTH ONCE DAILY       Medication is on current medication list Yes    Dosage and directions were verified? Yes    Quantity verified: 90 day supply     Pharmacy Verified?  Yes    Last Appointment:  10/15/2024    Future appts:  Future Appointments   Date Time Provider Department Center   4/15/2025  8:00 AM Manuel Meyers MD [place] [facility]   1/20/2026  9:30 AM Pete Acosta MD [facility]        (If no appt send self scheduling link. .REFILLAPPT)  Scheduling request sent?     [] Yes  [x] No    Does patient need updated?  [] Yes  [x] No

## 2025-04-12 SDOH — ECONOMIC STABILITY: FOOD INSECURITY: WITHIN THE PAST 12 MONTHS, YOU WORRIED THAT YOUR FOOD WOULD RUN OUT BEFORE YOU GOT MONEY TO BUY MORE.: NEVER TRUE

## 2025-04-12 SDOH — ECONOMIC STABILITY: FOOD INSECURITY: WITHIN THE PAST 12 MONTHS, THE FOOD YOU BOUGHT JUST DIDN'T LAST AND YOU DIDN'T HAVE MONEY TO GET MORE.: NEVER TRUE

## 2025-04-12 SDOH — ECONOMIC STABILITY: INCOME INSECURITY: IN THE LAST 12 MONTHS, WAS THERE A TIME WHEN YOU WERE NOT ABLE TO PAY THE MORTGAGE OR RENT ON TIME?: NO

## 2025-04-12 SDOH — ECONOMIC STABILITY: TRANSPORTATION INSECURITY
IN THE PAST 12 MONTHS, HAS THE LACK OF TRANSPORTATION KEPT YOU FROM MEDICAL APPOINTMENTS OR FROM GETTING MEDICATIONS?: NO

## 2025-04-12 ASSESSMENT — PATIENT HEALTH QUESTIONNAIRE - PHQ9
9. THOUGHTS THAT YOU WOULD BE BETTER OFF DEAD, OR OF HURTING YOURSELF: NOT AT ALL
8. MOVING OR SPEAKING SO SLOWLY THAT OTHER PEOPLE COULD HAVE NOTICED. OR THE OPPOSITE, BEING SO FIGETY OR RESTLESS THAT YOU HAVE BEEN MOVING AROUND A LOT MORE THAN USUAL: NOT AT ALL
10. IF YOU CHECKED OFF ANY PROBLEMS, HOW DIFFICULT HAVE THESE PROBLEMS MADE IT FOR YOU TO DO YOUR WORK, TAKE CARE OF THINGS AT HOME, OR GET ALONG WITH OTHER PEOPLE: NOT DIFFICULT AT ALL
2. FEELING DOWN, DEPRESSED OR HOPELESS: NOT AT ALL
SUM OF ALL RESPONSES TO PHQ QUESTIONS 1-9: 0
5. POOR APPETITE OR OVEREATING: NOT AT ALL
SUM OF ALL RESPONSES TO PHQ QUESTIONS 1-9: 0
SUM OF ALL RESPONSES TO PHQ QUESTIONS 1-9: 0
6. FEELING BAD ABOUT YOURSELF - OR THAT YOU ARE A FAILURE OR HAVE LET YOURSELF OR YOUR FAMILY DOWN: NOT AT ALL
1. LITTLE INTEREST OR PLEASURE IN DOING THINGS: NOT AT ALL
4. FEELING TIRED OR HAVING LITTLE ENERGY: NOT AT ALL
SUM OF ALL RESPONSES TO PHQ QUESTIONS 1-9: 0
3. TROUBLE FALLING OR STAYING ASLEEP: NOT AT ALL
7. TROUBLE CONCENTRATING ON THINGS, SUCH AS READING THE NEWSPAPER OR WATCHING TELEVISION: NOT AT ALL

## 2025-04-14 ASSESSMENT — PATIENT HEALTH QUESTIONNAIRE - PHQ9
5. POOR APPETITE OR OVEREATING: NOT AT ALL
3. TROUBLE FALLING OR STAYING ASLEEP: NOT AT ALL
SUM OF ALL RESPONSES TO PHQ QUESTIONS 1-9: 0
7. TROUBLE CONCENTRATING ON THINGS, SUCH AS READING THE NEWSPAPER OR WATCHING TELEVISION: NOT AT ALL
9. THOUGHTS THAT YOU WOULD BE BETTER OFF DEAD, OR OF HURTING YOURSELF: NOT AT ALL
6. FEELING BAD ABOUT YOURSELF - OR THAT YOU ARE A FAILURE OR HAVE LET YOURSELF OR YOUR FAMILY DOWN: NOT AT ALL
8. MOVING OR SPEAKING SO SLOWLY THAT OTHER PEOPLE COULD HAVE NOTICED. OR THE OPPOSITE - BEING SO FIDGETY OR RESTLESS THAT YOU HAVE BEEN MOVING AROUND A LOT MORE THAN USUAL: NOT AT ALL
1. LITTLE INTEREST OR PLEASURE IN DOING THINGS: NOT AT ALL
10. IF YOU CHECKED OFF ANY PROBLEMS, HOW DIFFICULT HAVE THESE PROBLEMS MADE IT FOR YOU TO DO YOUR WORK, TAKE CARE OF THINGS AT HOME, OR GET ALONG WITH OTHER PEOPLE: NOT DIFFICULT AT ALL
4. FEELING TIRED OR HAVING LITTLE ENERGY: NOT AT ALL
2. FEELING DOWN, DEPRESSED OR HOPELESS: NOT AT ALL

## 2025-04-29 ENCOUNTER — OFFICE VISIT (OUTPATIENT)
Dept: PRIMARY CARE CLINIC | Age: 55
End: 2025-04-29

## 2025-04-29 VITALS
SYSTOLIC BLOOD PRESSURE: 114 MMHG | DIASTOLIC BLOOD PRESSURE: 70 MMHG | RESPIRATION RATE: 18 BRPM | BODY MASS INDEX: 29.43 KG/M2 | HEIGHT: 59 IN | OXYGEN SATURATION: 98 % | WEIGHT: 146 LBS | HEART RATE: 69 BPM | TEMPERATURE: 98 F

## 2025-04-29 DIAGNOSIS — F17.211 CIGARETTE NICOTINE DEPENDENCE IN REMISSION: ICD-10-CM

## 2025-04-29 DIAGNOSIS — B07.8 PERIUNGUAL WART: Primary | ICD-10-CM

## 2025-04-29 DIAGNOSIS — I10 ESSENTIAL HYPERTENSION: ICD-10-CM

## 2025-04-29 DIAGNOSIS — E06.3 HYPOTHYROIDISM DUE TO HASHIMOTO'S THYROIDITIS: ICD-10-CM

## 2025-04-29 DIAGNOSIS — E78.2 MIXED HYPERLIPIDEMIA: ICD-10-CM

## 2025-04-29 PROBLEM — R06.02 SHORTNESS OF BREATH: Status: ACTIVE | Noted: 2025-04-29

## 2025-04-29 PROBLEM — E78.5 HYPERLIPIDEMIA: Status: ACTIVE | Noted: 2019-06-03

## 2025-04-29 LAB
ANION GAP SERPL CALCULATED.3IONS-SCNC: 15 MMOL/L (ref 7–16)
BUN BLDV-MCNC: 21 MG/DL (ref 6–20)
CALCIUM SERPL-MCNC: 9.8 MG/DL (ref 8.6–10)
CHLORIDE BLD-SCNC: 98 MMOL/L (ref 98–107)
CHOLESTEROL, TOTAL: 181 MG/DL
CO2: 27 MMOL/L (ref 22–29)
CREAT SERPL-MCNC: 0.9 MG/DL (ref 0.5–1)
GFR, ESTIMATED: 79 ML/MIN/1.73M2
GLUCOSE BLD-MCNC: 90 MG/DL (ref 74–99)
HDLC SERPL-MCNC: 55 MG/DL
LDL CHOLESTEROL: 110 MG/DL
POTASSIUM SERPL-SCNC: 4.1 MMOL/L (ref 3.5–5.1)
SODIUM BLD-SCNC: 140 MMOL/L (ref 136–145)
T4 FREE: 1.5 NG/DL (ref 0.9–1.7)
TRIGL SERPL-MCNC: 82 MG/DL
TSH SERPL DL<=0.05 MIU/L-ACNC: 1.37 UIU/ML (ref 0.27–4.2)
VLDLC SERPL CALC-MCNC: 16 MG/DL

## 2025-04-29 RX ORDER — ESCITALOPRAM OXALATE 10 MG
10 TABLET ORAL DAILY
COMMUNITY
Start: 2025-04-18

## 2025-04-29 NOTE — PROGRESS NOTES
[facility]  [address] [facility] [address]  Dept: [phone]  Dept Fax: [phone]   DATE OF VISIT : 2025      Patient:  Swapna Potts  Age: 55 y.o.       : 1970      Chief complaint:   Chief Complaint   Patient presents with    Hypertension         History of Present Illness     History of Present Illness  The patient presents for evaluation of onychomycosis and medication management.    Persistent symptoms related to her fingernail have been ongoing for approximately one year. Despite completing the prescribed course of terbinafine, no relief has been experienced. A recent incident involved accidentally hitting her finger while cleaning the sink, resulting in significant pain. The condition appears to be spreading, with the nail frequently cracking and causing discomfort. The sensation is likened to that of an ingrown toenail, with pain upon contact.    Concern is expressed about potential side effects from her cholesterol medication, particularly as recent weight loss has occurred. Occasionally feeling unwell after taking the medication is noted, although it is uncertain if this is directly related to the drug. All medications are administered concurrently at night.    A notification has been received indicating that she is due for a lung scan.    SOCIAL HISTORY  She is a former smoker.      Medication List:    Current Outpatient Medications   Medication Sig Dispense Refill    LEXAPRO 10 MG tablet Take 1 tablet by mouth daily      pravastatin (PRAVACHOL) 20 MG tablet TAKE 1 TABLET BY MOUTH ONCE DAILY 90 tablet 1    atenolol (TENORMIN) 50 MG tablet TAKE 1 TABLET BY MOUTH ONCE DAILY 90 tablet 1    triamterene-hydroCHLOROthiazide (MAXZIDE-25) 37.5-25 MG per tablet Take 0.5 tablets by mouth daily 90 tablet 1    levothyroxine (SYNTHROID) 50 MCG tablet Take 1 tablet by mouth daily 90 tablet 3    Estradiol (VAGIFEM) 10 MCG TABS vaginal tablet

## 2025-04-30 ENCOUNTER — RESULTS FOLLOW-UP (OUTPATIENT)
Dept: FAMILY MEDICINE CLINIC | Age: 55
End: 2025-04-30

## 2025-04-30 DIAGNOSIS — D17.79 ADRENAL MYELOLIPOMA: Primary | ICD-10-CM

## 2025-04-30 LAB
HCT VFR BLD CALC: 40.5 % (ref 34–48)
HEMOGLOBIN: 12.9 G/DL (ref 11.5–15.5)
MCH RBC QN AUTO: 27.9 PG (ref 26–35)
MCHC RBC AUTO-ENTMCNC: 31.9 G/DL (ref 32–34.5)
MCV RBC AUTO: 87.7 FL (ref 80–99.9)
PDW BLD-RTO: 12.6 % (ref 11.5–15)
PLATELET # BLD: 424 K/UL (ref 130–450)
PMV BLD AUTO: 10.7 FL (ref 7–12)
RBC # BLD: 4.62 M/UL (ref 3.5–5.5)
WBC # BLD: 5.4 K/UL (ref 4.5–11.5)

## 2025-05-27 ENCOUNTER — HOSPITAL ENCOUNTER (OUTPATIENT)
Dept: CT IMAGING | Age: 55
Discharge: HOME | End: 2025-05-29
Payer: MEDICARE

## 2025-05-27 DIAGNOSIS — F17.211 CIGARETTE NICOTINE DEPENDENCE IN REMISSION: ICD-10-CM

## 2025-05-27 PROBLEM — D17.79 ADRENAL MYELOLIPOMA: Status: ACTIVE | Noted: 2025-05-27

## 2025-05-27 PROCEDURE — 71271 CT THORAX LUNG CANCER SCR C-: CPT

## 2025-07-30 ENCOUNTER — OFFICE VISIT (OUTPATIENT)
Dept: FAMILY MEDICINE CLINIC | Age: 55
End: 2025-07-30

## 2025-07-30 VITALS
WEIGHT: 145 LBS | DIASTOLIC BLOOD PRESSURE: 58 MMHG | SYSTOLIC BLOOD PRESSURE: 116 MMHG | OXYGEN SATURATION: 96 % | HEART RATE: 83 BPM | BODY MASS INDEX: 29.23 KG/M2 | RESPIRATION RATE: 18 BRPM | HEIGHT: 59 IN | TEMPERATURE: 98.1 F

## 2025-07-30 DIAGNOSIS — L25.5 CONTACT DERMATITIS DUE TO PLANT: Primary | ICD-10-CM

## 2025-07-30 RX ORDER — CLOBETASOL PROPIONATE 0.5 MG/G
OINTMENT TOPICAL 2 TIMES DAILY
Qty: 60 G | Refills: 0 | Status: SHIPPED | OUTPATIENT
Start: 2025-07-30

## 2025-07-30 RX ORDER — PREDNISONE 10 MG/1
TABLET ORAL
Qty: 35 TABLET | Refills: 0 | Status: SHIPPED | OUTPATIENT
Start: 2025-07-30

## 2025-07-30 RX ORDER — METHYLPREDNISOLONE ACETATE 40 MG/ML
40 INJECTION, SUSPENSION INTRA-ARTICULAR; INTRALESIONAL; INTRAMUSCULAR; SOFT TISSUE ONCE
Status: COMPLETED | OUTPATIENT
Start: 2025-07-30 | End: 2025-07-30

## 2025-07-30 RX ADMIN — METHYLPREDNISOLONE ACETATE 40 MG: 40 INJECTION, SUSPENSION INTRA-ARTICULAR; INTRALESIONAL; INTRAMUSCULAR; SOFT TISSUE at 15:52

## 2025-07-30 ASSESSMENT — ENCOUNTER SYMPTOMS
SHORTNESS OF BREATH: 0
BACK PAIN: 0
VOMITING: 0
PHOTOPHOBIA: 0
ABDOMINAL PAIN: 0
COUGH: 0
DIARRHEA: 0
NAUSEA: 0
SORE THROAT: 0

## 2025-07-30 NOTE — PROGRESS NOTES
25  Swapna ARNOL Potts : 1970 Sex: female  Age 55 y.o.      Subjective:  Chief Complaint   Patient presents with    Poison Ivy         History of Present Illness  The patient is a 55-year-old female who presents for evaluation of poison ivy.    She has a history of recurrent poison ivy, which she believes enters her bloodstream. She noticed a new patch on her buttocks yesterday. The rash tends to worsen at night, particularly when she feels warm. She has been applying bleach to the affected areas, even though she is aware that this is not recommended.         Review of Systems   Constitutional:  Negative for chills and fever.   HENT:  Negative for congestion, ear pain and sore throat.    Eyes:  Negative for photophobia and visual disturbance.   Respiratory:  Negative for cough and shortness of breath.    Cardiovascular:  Negative for chest pain.   Gastrointestinal:  Negative for abdominal pain, diarrhea, nausea and vomiting.   Genitourinary:  Negative for difficulty urinating, dysuria, frequency and urgency.   Musculoskeletal:  Negative for back pain, neck pain and neck stiffness.   Skin:  Positive for rash.   Neurological:  Negative for dizziness, syncope, weakness, light-headedness and headaches.   Hematological:  Negative for adenopathy. Does not bruise/bleed easily.   Psychiatric/Behavioral:  Negative for agitation and confusion.    All other systems reviewed and are negative.        PMH:     Past Medical History:   Diagnosis Date    Essential hypertension 2019    Gallstones     Hip arthritis     B/L BRAXTON    Hypothyroidism due to Hashimoto's thyroiditis     Mixed hyperlipidemia 2019    Non-rheumatic mitral regurgitation 2019    Obesity        Past Surgical History:   Procedure Laterality Date     SECTION      three times    CHOLECYSTECTOMY, LAPAROSCOPIC N/A 2021    LAPAROSCOPIC ROBOTIC ASSISTED CHOLECYSTECTOMY performed by Geovani Shafer DO at [facility] OR    HIP ARTHROPLASTY

## 2025-08-22 ENCOUNTER — TELEPHONE (OUTPATIENT)
Dept: PHARMACY | Facility: CLINIC | Age: 55
End: 2025-08-22

## (undated) DEVICE — ADHESIVE SKIN CLSR 0.7ML TOP DERMBND ADV

## (undated) DEVICE — GLOVE SURG SZ 75 L12IN FNGR THK94MIL TRNSLUC YEL LTX

## (undated) DEVICE — SCISSORS SURG DIA8MM MPLR CRV ENDOWRIST

## (undated) DEVICE — CAMERA STRYKER 1488

## (undated) DEVICE — INTENDED FOR TISSUE SEPARATION, AND OTHER PROCEDURES THAT REQUIRE A SHARP SURGICAL BLADE TO PUNCTURE OR CUT.: Brand: BARD-PARKER ® STAINLESS STEEL BLADES

## (undated) DEVICE — CADIERE FORCEPS: Brand: ENDOWRIST

## (undated) DEVICE — SUTURE VCRL SZ 2-0 L27IN ABSRB VLT L26MM UR-6 5/8 CIR J602H

## (undated) DEVICE — PROGRASP FORCEPS: Brand: ENDOWRIST

## (undated) DEVICE — MARYLAND BIPOLAR FORCEPS: Brand: ENDOWRIST

## (undated) DEVICE — DRAPE,LAP,CHOLE,W/TROUGHS,STERILE: Brand: MEDLINE

## (undated) DEVICE — TISSUE RETRIEVAL SYSTEM: Brand: INZII RETRIEVAL SYSTEM

## (undated) DEVICE — CHLORAPREP 26ML ORANGE

## (undated) DEVICE — MEDIUM-LARGE CLIP APPLIER: Brand: ENDOWRIST

## (undated) DEVICE — TIP COVER ACCESSORY

## (undated) DEVICE — INSUFFLATION NEEDLE TO ESTABLISH PNEUMOPERITONEUM.: Brand: INSUFFLATION NEEDLE

## (undated) DEVICE — SOLUTION IV IRRIG POUR BRL 0.9% SODIUM CHL 2F7124

## (undated) DEVICE — GLOVE SURG SZ 75 L12IN FNGR THK83MIL CRM POLYISOPRENE

## (undated) DEVICE — NEEDLE HYPO 25GA L1.5IN BLU POLYPR HUB S STL REG BVL STR

## (undated) DEVICE — GOWN,SIRUS,FABRNF,XL,20/CS: Brand: MEDLINE

## (undated) DEVICE — KIT,ANTI FOG,W/SPONGE & FLUID,SOFT PACK: Brand: MEDLINE

## (undated) DEVICE — PACK PROCEDURE SURG GEN CUST

## (undated) DEVICE — CANNULA SEAL

## (undated) DEVICE — INSUFFLATION TUBING SET WITH FILTER, FUNNEL CONNECTOR AND LUER LOCK: Brand: JOSNOE MEDICAL INC

## (undated) DEVICE — ELECTRODE PT RET AD L9FT HI MOIST COND ADH HYDRGEL CORDED

## (undated) DEVICE — SUTURE MCRYL + SZ 4-0 L18IN ABSRB UD L19MM PS-2 3/8 CIR MCP496G

## (undated) DEVICE — BLADELESS OBTURATOR: Brand: WECK VISTA

## (undated) DEVICE — INSTRUMENT CLAMP TOWEL LARGE REUSABLE

## (undated) DEVICE — TRAY 30 DEG STRYKER 5MM/10MM LENS REUSABLE

## (undated) DEVICE — SUCTION IRRIGATOR: Brand: ENDOWRIST

## (undated) DEVICE — SYRINGE 20ML LL S/C 50

## (undated) DEVICE — DOUBLE BASIN SET: Brand: MEDLINE INDUSTRIES, INC.

## (undated) DEVICE — ARM DRAPE

## (undated) DEVICE — TOWEL,OR,DSP,ST,BLUE,STD,6/PK,12PK/CS: Brand: MEDLINE

## (undated) DEVICE — NEEDLE CLOSURE OMNICLOSE

## (undated) DEVICE — WARMER SCP 2 ANTIFOG LAP DISP